# Patient Record
Sex: MALE | Race: WHITE | Employment: OTHER | URBAN - METROPOLITAN AREA
[De-identification: names, ages, dates, MRNs, and addresses within clinical notes are randomized per-mention and may not be internally consistent; named-entity substitution may affect disease eponyms.]

---

## 2017-01-06 ENCOUNTER — APPOINTMENT (OUTPATIENT)
Dept: CT IMAGING | Age: 72
DRG: 310 | End: 2017-01-06
Attending: EMERGENCY MEDICINE
Payer: MEDICARE

## 2017-01-06 ENCOUNTER — HOSPITAL ENCOUNTER (INPATIENT)
Age: 72
LOS: 2 days | Discharge: HOME | DRG: 310 | End: 2017-01-08
Attending: EMERGENCY MEDICINE | Admitting: FAMILY MEDICINE
Payer: MEDICARE

## 2017-01-06 ENCOUNTER — APPOINTMENT (OUTPATIENT)
Dept: GENERAL RADIOLOGY | Age: 72
DRG: 310 | End: 2017-01-06
Attending: EMERGENCY MEDICINE
Payer: MEDICARE

## 2017-01-06 DIAGNOSIS — R41.82 ALTERED MENTAL STATUS, UNSPECIFIED ALTERED MENTAL STATUS TYPE: ICD-10-CM

## 2017-01-06 DIAGNOSIS — R53.1 WEAKNESS: ICD-10-CM

## 2017-01-06 DIAGNOSIS — I48.0 PAROXYSMAL ATRIAL FIBRILLATION (HCC): Primary | ICD-10-CM

## 2017-01-06 PROBLEM — I48.91 ATRIAL FIBRILLATION WITH RVR (HCC): Status: ACTIVE | Noted: 2017-01-06

## 2017-01-06 PROBLEM — R77.8 TROPONIN LEVEL ELEVATED: Status: ACTIVE | Noted: 2017-01-06

## 2017-01-06 LAB
ALBUMIN SERPL BCP-MCNC: 3.6 G/DL (ref 3.5–5)
ALBUMIN/GLOB SERPL: 1.1 {RATIO} (ref 1.1–2.2)
ALP SERPL-CCNC: 125 U/L (ref 45–117)
ALT SERPL-CCNC: 62 U/L (ref 12–78)
ANION GAP BLD CALC-SCNC: 10 MMOL/L (ref 5–15)
APPEARANCE UR: CLEAR
APTT PPP: 25.3 SEC (ref 22.1–32.5)
AST SERPL W P-5'-P-CCNC: 53 U/L (ref 15–37)
BACTERIA URNS QL MICRO: NEGATIVE /HPF
BASOPHILS # BLD AUTO: 0 K/UL (ref 0–0.1)
BASOPHILS # BLD: 0 % (ref 0–1)
BILIRUB SERPL-MCNC: 0.2 MG/DL (ref 0.2–1)
BILIRUB UR QL: NEGATIVE
BUN SERPL-MCNC: 18 MG/DL (ref 6–20)
BUN/CREAT SERPL: 17 (ref 12–20)
CALCIUM SERPL-MCNC: 8.3 MG/DL (ref 8.5–10.1)
CHLORIDE SERPL-SCNC: 106 MMOL/L (ref 97–108)
CO2 SERPL-SCNC: 25 MMOL/L (ref 21–32)
COLOR UR: ABNORMAL
CREAT SERPL-MCNC: 1.04 MG/DL (ref 0.7–1.3)
DIFFERENTIAL METHOD BLD: ABNORMAL
EOSINOPHIL # BLD: 0.3 K/UL (ref 0–0.4)
EOSINOPHIL NFR BLD: 4 % (ref 0–7)
EPITH CASTS URNS QL MICRO: ABNORMAL /LPF
ERYTHROCYTE [DISTWIDTH] IN BLOOD BY AUTOMATED COUNT: 17.4 % (ref 11.5–14.5)
ETHANOL SERPL-MCNC: <10 MG/DL
GLOBULIN SER CALC-MCNC: 3.2 G/DL (ref 2–4)
GLUCOSE BLD STRIP.AUTO-MCNC: 100 MG/DL (ref 65–100)
GLUCOSE BLD STRIP.AUTO-MCNC: 142 MG/DL (ref 65–100)
GLUCOSE SERPL-MCNC: 161 MG/DL (ref 65–100)
GLUCOSE UR STRIP.AUTO-MCNC: NEGATIVE MG/DL
HCT VFR BLD AUTO: 40.8 % (ref 36.6–50.3)
HGB BLD-MCNC: 13.3 G/DL (ref 12.1–17)
HGB UR QL STRIP: ABNORMAL
INR PPP: 1 (ref 0.9–1.1)
KETONES UR QL STRIP.AUTO: NEGATIVE MG/DL
LACTATE SERPL-SCNC: 2.5 MMOL/L (ref 0.4–2)
LEUKOCYTE ESTERASE UR QL STRIP.AUTO: NEGATIVE
LYMPHOCYTES # BLD AUTO: 39 % (ref 12–49)
LYMPHOCYTES # BLD: 2.7 K/UL
MCH RBC QN AUTO: 23.6 PG (ref 26–34)
MCHC RBC AUTO-ENTMCNC: 32.6 G/DL (ref 30–36.5)
MCV RBC AUTO: 72.5 FL (ref 80–99)
MONOCYTES # BLD: 0.8 K/UL (ref 0–1)
MONOCYTES NFR BLD AUTO: 11 % (ref 5–13)
NEUTS SEG # BLD: 3.2 K/UL (ref 1.8–8)
NEUTS SEG NFR BLD AUTO: 46 % (ref 32–75)
NITRITE UR QL STRIP.AUTO: NEGATIVE
PH UR STRIP: 6.5 [PH] (ref 5–8)
PLATELET # BLD AUTO: 161 K/UL (ref 150–400)
POTASSIUM SERPL-SCNC: 3.9 MMOL/L (ref 3.5–5.1)
PROT SERPL-MCNC: 6.8 G/DL (ref 6.4–8.2)
PROT UR STRIP-MCNC: NEGATIVE MG/DL
PROTHROMBIN TIME: 10.1 SEC (ref 9–11.1)
RBC # BLD AUTO: 5.63 M/UL (ref 4.1–5.7)
RBC #/AREA URNS HPF: ABNORMAL /HPF (ref 0–5)
RBC MORPH BLD: ABNORMAL
SERVICE CMNT-IMP: ABNORMAL
SERVICE CMNT-IMP: NORMAL
SODIUM SERPL-SCNC: 141 MMOL/L (ref 136–145)
SP GR UR REFRACTOMETRY: 1.01 (ref 1–1.03)
THERAPEUTIC RANGE,PTTT: NORMAL SECS (ref 58–77)
TROPONIN I SERPL-MCNC: 0.07 NG/ML
UA: UC IF INDICATED,UAUC: ABNORMAL
UROBILINOGEN UR QL STRIP.AUTO: 0.2 EU/DL (ref 0.2–1)
WBC # BLD AUTO: 7 K/UL (ref 4.1–11.1)
WBC MORPH BLD: ABNORMAL
WBC URNS QL MICRO: ABNORMAL /HPF (ref 0–4)

## 2017-01-06 PROCEDURE — 74011250636 HC RX REV CODE- 250/636: Performed by: INTERNAL MEDICINE

## 2017-01-06 PROCEDURE — 99285 EMERGENCY DEPT VISIT HI MDM: CPT

## 2017-01-06 PROCEDURE — 74011000258 HC RX REV CODE- 258: Performed by: EMERGENCY MEDICINE

## 2017-01-06 PROCEDURE — 71010 XR CHEST PORT: CPT

## 2017-01-06 PROCEDURE — 74011000250 HC RX REV CODE- 250: Performed by: EMERGENCY MEDICINE

## 2017-01-06 PROCEDURE — 85025 COMPLETE CBC W/AUTO DIFF WBC: CPT | Performed by: EMERGENCY MEDICINE

## 2017-01-06 PROCEDURE — 82550 ASSAY OF CK (CPK): CPT | Performed by: INTERNAL MEDICINE

## 2017-01-06 PROCEDURE — 83605 ASSAY OF LACTIC ACID: CPT | Performed by: FAMILY MEDICINE

## 2017-01-06 PROCEDURE — 65660000000 HC RM CCU STEPDOWN

## 2017-01-06 PROCEDURE — 85730 THROMBOPLASTIN TIME PARTIAL: CPT | Performed by: EMERGENCY MEDICINE

## 2017-01-06 PROCEDURE — 36415 COLL VENOUS BLD VENIPUNCTURE: CPT | Performed by: INTERNAL MEDICINE

## 2017-01-06 PROCEDURE — 83605 ASSAY OF LACTIC ACID: CPT | Performed by: EMERGENCY MEDICINE

## 2017-01-06 PROCEDURE — 70450 CT HEAD/BRAIN W/O DYE: CPT

## 2017-01-06 PROCEDURE — 82962 GLUCOSE BLOOD TEST: CPT

## 2017-01-06 PROCEDURE — 74011250637 HC RX REV CODE- 250/637: Performed by: FAMILY MEDICINE

## 2017-01-06 PROCEDURE — 87040 BLOOD CULTURE FOR BACTERIA: CPT | Performed by: EMERGENCY MEDICINE

## 2017-01-06 PROCEDURE — 80053 COMPREHEN METABOLIC PANEL: CPT | Performed by: EMERGENCY MEDICINE

## 2017-01-06 PROCEDURE — 93005 ELECTROCARDIOGRAM TRACING: CPT

## 2017-01-06 PROCEDURE — 84484 ASSAY OF TROPONIN QUANT: CPT | Performed by: EMERGENCY MEDICINE

## 2017-01-06 PROCEDURE — 80307 DRUG TEST PRSMV CHEM ANLYZR: CPT | Performed by: EMERGENCY MEDICINE

## 2017-01-06 PROCEDURE — 85610 PROTHROMBIN TIME: CPT | Performed by: EMERGENCY MEDICINE

## 2017-01-06 PROCEDURE — 83036 HEMOGLOBIN GLYCOSYLATED A1C: CPT | Performed by: INTERNAL MEDICINE

## 2017-01-06 PROCEDURE — 81001 URINALYSIS AUTO W/SCOPE: CPT | Performed by: EMERGENCY MEDICINE

## 2017-01-06 RX ORDER — SODIUM CHLORIDE 0.9 % (FLUSH) 0.9 %
5-10 SYRINGE (ML) INJECTION AS NEEDED
Status: DISCONTINUED | OUTPATIENT
Start: 2017-01-06 | End: 2017-01-08 | Stop reason: HOSPADM

## 2017-01-06 RX ORDER — SODIUM CHLORIDE 9 MG/ML
75 INJECTION, SOLUTION INTRAVENOUS CONTINUOUS
Status: DISCONTINUED | OUTPATIENT
Start: 2017-01-06 | End: 2017-01-07

## 2017-01-06 RX ORDER — ROSUVASTATIN CALCIUM 10 MG/1
10 TABLET, COATED ORAL
Status: DISCONTINUED | OUTPATIENT
Start: 2017-01-06 | End: 2017-01-08 | Stop reason: HOSPADM

## 2017-01-06 RX ORDER — ASPIRIN AND DIPYRIDAMOLE 25; 200 MG/1; MG/1
1 CAPSULE, EXTENDED RELEASE ORAL 2 TIMES DAILY
Status: DISCONTINUED | OUTPATIENT
Start: 2017-01-06 | End: 2017-01-07

## 2017-01-06 RX ORDER — ZOLPIDEM TARTRATE 5 MG/1
5 TABLET ORAL
Status: DISCONTINUED | OUTPATIENT
Start: 2017-01-06 | End: 2017-01-06 | Stop reason: ALTCHOICE

## 2017-01-06 RX ORDER — HYDROMORPHONE HYDROCHLORIDE 1 MG/ML
0.5 INJECTION, SOLUTION INTRAMUSCULAR; INTRAVENOUS; SUBCUTANEOUS
Status: DISCONTINUED | OUTPATIENT
Start: 2017-01-06 | End: 2017-01-08 | Stop reason: HOSPADM

## 2017-01-06 RX ORDER — OMEPRAZOLE 20 MG/1
20 CAPSULE, DELAYED RELEASE ORAL DAILY
COMMUNITY
End: 2018-01-12 | Stop reason: SDUPTHER

## 2017-01-06 RX ORDER — ROSUVASTATIN CALCIUM 20 MG/1
10 TABLET, COATED ORAL
COMMUNITY
End: 2018-01-12 | Stop reason: SINTOL

## 2017-01-06 RX ORDER — OXYCODONE AND ACETAMINOPHEN 5; 325 MG/1; MG/1
1 TABLET ORAL
Status: DISCONTINUED | OUTPATIENT
Start: 2017-01-06 | End: 2017-01-08 | Stop reason: HOSPADM

## 2017-01-06 RX ORDER — TRIAMCINOLONE ACETONIDE 1 MG/G
OINTMENT TOPICAL 3 TIMES DAILY
COMMUNITY
End: 2018-01-12 | Stop reason: ALTCHOICE

## 2017-01-06 RX ORDER — ENOXAPARIN SODIUM 100 MG/ML
40 INJECTION SUBCUTANEOUS EVERY 24 HOURS
Status: DISCONTINUED | OUTPATIENT
Start: 2017-01-06 | End: 2017-01-08 | Stop reason: ALTCHOICE

## 2017-01-06 RX ORDER — MAGNESIUM SULFATE 100 %
4 CRYSTALS MISCELLANEOUS AS NEEDED
Status: DISCONTINUED | OUTPATIENT
Start: 2017-01-06 | End: 2017-01-08 | Stop reason: HOSPADM

## 2017-01-06 RX ORDER — INSULIN LISPRO 100 [IU]/ML
INJECTION, SOLUTION INTRAVENOUS; SUBCUTANEOUS
Status: DISCONTINUED | OUTPATIENT
Start: 2017-01-06 | End: 2017-01-08 | Stop reason: HOSPADM

## 2017-01-06 RX ORDER — OMEPRAZOLE 20 MG/1
20 CAPSULE, DELAYED RELEASE ORAL DAILY
Status: DISCONTINUED | OUTPATIENT
Start: 2017-01-07 | End: 2017-01-06 | Stop reason: CLARIF

## 2017-01-06 RX ORDER — PANTOPRAZOLE SODIUM 40 MG/1
40 TABLET, DELAYED RELEASE ORAL
Status: DISCONTINUED | OUTPATIENT
Start: 2017-01-07 | End: 2017-01-08 | Stop reason: HOSPADM

## 2017-01-06 RX ORDER — DILTIAZEM HYDROCHLORIDE 5 MG/ML
10 INJECTION INTRAVENOUS
Status: COMPLETED | OUTPATIENT
Start: 2017-01-06 | End: 2017-01-06

## 2017-01-06 RX ORDER — SODIUM CHLORIDE 0.9 % (FLUSH) 0.9 %
5-10 SYRINGE (ML) INJECTION EVERY 8 HOURS
Status: DISCONTINUED | OUTPATIENT
Start: 2017-01-06 | End: 2017-01-08 | Stop reason: HOSPADM

## 2017-01-06 RX ORDER — CLOBETASOL PROPIONATE 0.46 MG/ML
SOLUTION TOPICAL 2 TIMES DAILY
COMMUNITY
End: 2018-01-29 | Stop reason: SDUPTHER

## 2017-01-06 RX ORDER — DEXTROSE 50 % IN WATER (D50W) INTRAVENOUS SYRINGE
12.5-25 AS NEEDED
Status: DISCONTINUED | OUTPATIENT
Start: 2017-01-06 | End: 2017-01-08 | Stop reason: HOSPADM

## 2017-01-06 RX ORDER — LEVOTHYROXINE SODIUM 100 UG/1
100 TABLET ORAL
Status: DISCONTINUED | OUTPATIENT
Start: 2017-01-07 | End: 2017-01-08 | Stop reason: HOSPADM

## 2017-01-06 RX ORDER — ACETAMINOPHEN 325 MG/1
650 TABLET ORAL
Status: DISCONTINUED | OUTPATIENT
Start: 2017-01-06 | End: 2017-01-08 | Stop reason: HOSPADM

## 2017-01-06 RX ADMIN — Medication 10 ML: at 21:37

## 2017-01-06 RX ADMIN — DILTIAZEM HYDROCHLORIDE 10 MG/HR: 5 INJECTION, SOLUTION INTRAVENOUS at 18:20

## 2017-01-06 RX ADMIN — DILTIAZEM HYDROCHLORIDE 10 MG: 5 INJECTION INTRAVENOUS at 18:15

## 2017-01-06 RX ADMIN — ASPIRIN AND DIPYRIDAMOLE 1 CAPSULE: 25; 200 CAPSULE, EXTENDED RELEASE ORAL at 23:33

## 2017-01-06 RX ADMIN — ROSUVASTATIN CALCIUM 10 MG: 10 TABLET, FILM COATED ORAL at 23:34

## 2017-01-06 RX ADMIN — SODIUM CHLORIDE 75 ML/HR: 900 INJECTION, SOLUTION INTRAVENOUS at 21:34

## 2017-01-06 RX ADMIN — ENOXAPARIN SODIUM 40 MG: 40 INJECTION SUBCUTANEOUS at 21:36

## 2017-01-06 NOTE — ED NOTES
Pt become more \"alert\" over in CT. After CT finished pt started asking where he was and where his wife was. Pt talking more, alert and oriented x4.

## 2017-01-06 NOTE — ED NOTES
Pt having episodes of A-fib. Has had 2 episodes so far, lasting 30 seconds or less. Dr. Parris Noel aware.

## 2017-01-06 NOTE — ED PROVIDER NOTES
HPI Comments: The patient was brought in by his wife because of generalized weakness, confusion and the sensation that his heart was beating fast. On arrival here he collapsed while being taken out of the car by the nursing staff. He was wheeled back to room 13 and had to be lifted out of the wheelchair onto the stretcher. The patient is awake but confused, has slurred speech, and repeatedly says that his heart is \"beating out of my chest\". According to his wife he had complained of the past heart rate at around 3:30 this afternoon, and then he laid down for about an hour, and when he got up around 4:30 he felt worse, and felt like he did when he had a TIA/CVA 34 years ago. He denies chest pain, headache and there has been no vomiting or shortness of breath. Past Medical History:   Diagnosis Date    Acid reflux     Cardiac dysrhythmia, unspecified 12/21/2011    Diabetes ([address]     GERD (gastroesophageal reflux disease)     Headache(784.0) 12/21/2011    Hypercholesterolemia     Hypothyroid     Obesity     PUD (peptic ulcer disease)     TIA (transient ischemic attack)      2014       Past Surgical History:   Procedure Laterality Date    Hx gastrectomy      Hx tumor removal  2015     Left Eye    Hx mohs procedure Right      right shoulder    Hx heent       left retroorbital tumor resection         Family History:   Problem Relation Age of Onset    Diabetes Sister     Cancer Sister      lung    Diabetes Mother     Cancer Father      brain    Diabetes Maternal Grandmother     Hypertension Neg Hx        Social History     Social History    Marital status:      Spouse name: N/A    Number of children: N/A    Years of education: N/A     Occupational History    Not on file.      Social History Main Topics    Smoking status: Never Smoker    Smokeless tobacco: Never Used    Alcohol use No    Drug use: No    Sexual activity: No     Other Topics Concern    Not on file     Social History Narrative         ALLERGIES: Ambien [zolpidem]    Review of Systems   Constitutional: Negative for fever. Eyes: Negative for visual disturbance. Respiratory: Negative for cough, shortness of breath and wheezing. Cardiovascular: Positive for palpitations. Negative for chest pain and leg swelling. Gastrointestinal: Negative for abdominal pain, diarrhea, nausea and vomiting. Genitourinary: Negative for dysuria. Musculoskeletal: Negative. Negative for back pain and neck stiffness. Skin: Negative for rash. Neurological: Positive for weakness. Negative for syncope and headaches. Psychiatric/Behavioral: Positive for confusion. Vitals:    01/06/17 1718   BP: (!) 185/94   Pulse: 87   Resp: 12   Temp: 97.5 °F (36.4 °C)   SpO2: 96%   Weight: 88.5 kg (195 lb)            Physical Exam   Constitutional: He appears well-developed and well-nourished. No distress. HENT:   Head: Normocephalic. Eyes: EOM are normal. Pupils are equal, round, and reactive to light. Neck: Normal range of motion. Neck supple. Cardiovascular: Normal rate and regular rhythm. No murmur heard. Pulmonary/Chest: Effort normal and breath sounds normal. No respiratory distress. Abdominal: Soft. There is no tenderness. Musculoskeletal: Normal range of motion. He exhibits no edema. Neurological: He is alert. He has normal strength. No cranial nerve deficit. Confused. ox2. Slurred/stuttering speech. Generalized weakness . i cannot see focal weakness at this point. Skin: Skin is warm and dry. Nursing note and vitals reviewed. [facility]  ED Course       Procedures ED EKG interpretation:  Rhythm:nsr, rate 86 no acute changes. This EKG was interpreted by Goyo Snider MD,ED Provider. 540 pm- pt had an~ 30 sec. Run of a. Fib. That spont. Converted. Then another run at 543 that was captured on 12 lead. Pt does not see dr. Warden Alvarez anymore. He switched at first of year to dr. Vesta Bishop and saw him 2 days ago.      Spoke crissy dr. Ra Dasilva - he will adm.

## 2017-01-06 NOTE — ED TRIAGE NOTES
Assisted pt out of the car with wife. Pt slumped over in the chair, was unable to help us get him in the W/C. Pt lifted by staff and placed pt in the bed. Dr. Marshall Enciso at bedside. BS is 142 at bedside. Pt is talking and answering questions. Around 1630,Wife states that he told her his heart was beating fast and thought he might be having a heart attack or stroke.  HR initially 135, now is 86, NSR.

## 2017-01-06 NOTE — H&P
[facility]  [address], [place] [address]  [phone]    Admission History and Physical      NAME:  Albaro Mercado   :   1945   MRN:  [id number]     PCP:  Trav Rossi MD     Date/Time:  2017         Subjective:     CHIEF COMPLAINT: palpitations    HISTORY OF PRESENT ILLNESS:     Mr. Pooja Felder is a 66 y.o.  male who is admitted with afib with RVR. Mr. Pooja Felder presented to the [place] [address] Emergency Department this PM complaining of palpitations: today, acute onset, multiple episodes, on and off, felt like heart was \"racing,\" associated with weakness and dizziness, felt like he might pass out, wife reports he became confused at times. In the ED, he was observed to go into afib with RVR up to the 170s and was symptomatic at the time    History obtained from spouse, chart review and the patient.      Previous records reviewed    Past Medical History   Diagnosis Date    Acid reflux     Cardiac dysrhythmia, unspecified 2011    [address] [address]     GERD (gastroesophageal reflux disease)     Headache(784.0) 2011    Hypercholesterolemia     Hypothyroid     Obesity     PUD (peptic ulcer disease)     TIA (transient ischemic attack)              Past Surgical History   Procedure Laterality Date    Hx gastrectomy      Hx tumor removal  2015     Left Eye    Hx mohs procedure Right      right shoulder    Hx heent       left retroorbital tumor resection       Social History   Substance Use Topics    Smoking status: Never Smoker    Smokeless tobacco: Never Used    Alcohol use No        Family History   Problem Relation Age of Onset    Diabetes Sister     Cancer Sister      lung    Diabetes Mother     Cancer Father      brain    Diabetes Maternal Grandmother     Hypertension Neg Hx         Allergies   Allergen Reactions    Ambien [Zolpidem] Other (comments)     Sleep walking  Doing things like cooking but he was not awake        Prior to Admission medications    Medication Sig Start Date End Date Taking? Authorizing Provider   HYDROcodone-acetaminophen (NORCO)  mg tablet Take 1 Tab by mouth two (2) times a day. Max Daily Amount: 2 Tabs. 12/15/16   John Chairez NP   levothyroxine (SYNTHROID) 100 mcg tablet TAKE 1 TABLET BY MOUTH EVERY DAY BEFORE BREAKFAST 84/5/63   Maribeth Escobar MD   clobetasol (TEMOVATE) 0.05 % external solution USE 2 TIMES DAILY IN SCALP 75/9/51   Maribeth Escobar MD   mupirocin [facility]) 2 % ointment Apply  to affected area two (2) times a day. 10/21/16   John Chairez NP   clotrimazole-betamethasone (LOTRISONE) topical cream APPLY TO AFFECTED AREA FOUR TIMES A DAY 92/5/28   Maribeth Escobar MD   omeprazole (PRILOSEC) 20 mg capsule Take 20 mg by mouth once over twenty-four (24) hours. 9/3/16   Historical Provider   naproxen (NAPROSYN) 500 mg tablet Take 1 Tab by mouth two (2) times daily (with meals). Indications: PAIN 9/38/08   Maribeth Escobar MD   mupirocin [facility]) 2 % ointment Apply  to affected area daily. 8/54/12   Maribeth Escobar MD   gentamicin (GARAMYCIN) 0.3 % ophthalmic solution Administer 2 Drops to both eyes every four (4) hours. 6/85/56   Maribeth Escobar MD   rosuvastatin (CRESTOR) 20 mg tablet Take 1 Tab by mouth nightly. 4/2/12   Maribeth Escobar MD   FREESTYLE LITE STRIPS strip 100 Each by Does Not Apply route once over twenty-four (24) hours. Test blood sugar up to 3 times daily   Dx R46.2 3/59/26   Maribeth Escobar MD   triamcinolone acetonide (KENALOG) 0.1 % topical cream Apply 1 Drop to affected area two (2) times daily as needed. 5/15/16   Historical Provider   metFORMIN (GLUCOPHAGE) 1,000 mg tablet Take 1,000 mg by mouth two (2) times daily (with meals). Historical Provider   melatonin (MELATONIN) 5 mg cap capsule Take 10 mg by mouth nightly.     Historical Provider   fluocinoNIDE (LIDEX) 0.05 % ointment Apply 1 Drop to affected area two (2) times daily as needed. 2/19/16   Historical Provider   PROCTOSOL HC 2.5 % rectal cream USE ON RECTUM TWICE A DAY 0/55/58   Win Sorto MD   FREESTYLE LANCETS 28 gauge misc 1 Package by IntraDERMal route once over twenty-four (24) hours. 3/10/16   Historical Provider   multivitamin (ONE A DAY) tablet Take 1 Tab by mouth daily. Historical Provider   Cholecalciferol, Vitamin D3, 1,000 unit cap Take 1,000 Units by mouth daily. Historical Provider   dipyridamole-aspirin (AGGRENOX) 200-25 mg per SR capsule Take 1 Cap by mouth two (2) times a day.  11/30/12   Jamir Nash MD         Review of Systems:  (bold if positive, if negative)    Gen:  fatigueEyes:  ENT:  CVS:  PalpitationsdizzinessPulm:  GI:    :    MS:  Skin:  Psych:  Endo:    Hem:  Renal:    Neuro:  weakness          Objective:      VITALS:    Vital signs reviewed; most recent are:    Visit Vitals    /82 (BP 1 Location: Left arm, BP Patient Position: At rest)    Pulse 84    Temp 97.5 °F (36.4 °C)    Resp 10    Wt 88.5 kg (195 lb)    SpO2 95%    BMI 30.54 kg/m2     SpO2 Readings from Last 6 Encounters:   01/06/17 95%   12/15/16 99%   11/15/16 90%   10/14/16 95%   09/16/16 96%   08/10/16 97%        No intake or output data in the 24 hours ending 01/06/17 1758         Exam:     Physical Exam:    Gen: Well-developed, obese, in no acute distress  HEENT:  Pink conjunctivae, PERRL, hearing intact to voice, moist mucous membranes  Neck: Supple, without masses, thyroid non-tender  Resp: No accessory muscle use, clear breath sounds without wheezes rales or rhonchi  Card: No murmurs, irreg, irreg S1, S2 without thrills, bruits or peripheral edema  Abd:  Soft, non-tender, non-distended, normoactive bowel sounds are present, no palpable organomegaly and no detectable hernias  Lymph:  No cervical or inguinal adenopathy  Musc: No cyanosis or clubbing  Skin: No rashes or ulcers, skin turgor is good  Neuro:  Cranial nerves are grossly intact, no focal motor weakness, follows commands appropriately  Psych:  Good insight, oriented to person, place and time, alert             Labs:    No results for input(s): WBC, HGB, HCT, PLT, HGBEXT, HCTEXT, PLTEXT, HGBEXT, HCTEXT, PLTEXT in the last 72 hours. No results for input(s): NA, K, CL, CO2, GLU, BUN, CREA, CA, MG, PHOS, ALB, TBIL, TBILI, SGOT, ALT in the last 72 hours. Lab Results   Component Value Date/Time    Glucose (POC) 142 01/06/2017 05:12 PM    Glucose (POC) 99 11/13/2012 04:34 PM    Glucose (POC) 99 04/04/2012 06:03 PM    Glucose (POC) 93 12/21/2011 05:24 PM     No results for input(s): PH, PCO2, PO2, HCO3, FIO2 in the last 72 hours. No results for input(s): INR in the last 72 hours. No lab exists for component: INREXT, INREXT    Additional testing:  Chest X-ray: no acute process.   Results not reviewed with Radiologist.    Head CT - No acute process       Assessment/Plan:       Principal Problem:    Atrial fibrillation with RVR ([facility] [address] (1/6/2017)   - started on diltiazem at [facility] [place], will continue   - Cardiology to see   - check echo and TSH    Active Problems:    Mixed hyperlipidemia (10/2/2010)   - continue statin      Obese (12/21/2011)   - counseled on weight loss      GERD (gastroesophageal reflux disease) (1/14/2016)   - continue PPI      Diabetes mellitus type 2, controlled ([facility] [address] (1/14/2016)   - follow BS on home meds with SSI   - check A1c      Elevated troponin   - minimally elevated, likely due to rate-related strain   - trend enzymes, echo as above       Code status:   - patient is FULL CODE      Total time spent with patient: [address] discussed with: Patient, Family, Nursing Staff and     Discussed:  Code Status, Care Plan and D/C Planning    Prophylaxis:  Lovenox and SCD's    Probable Disposition:  Home w/Family           ___________________________________________________    Attending Physician: Manuel Servin MD

## 2017-01-06 NOTE — IP AVS SNAPSHOT
Current Discharge Medication List  
  
Take these medications at their scheduled times Dose & Instructions Dispensing Information Comments Morning Noon Evening Bedtime  
 apixaban 5 mg tablet Commonly known as:  Pham Net Your next dose is: Today, Tomorrow Other:  ____________ Dose:  5 mg Take 1 Tab by mouth two (2) times a day. Quantity:  60 Tab Refills:  0  
     
   
   
   
  
 aspirin 81 mg chewable tablet Your next dose is: Today, Tomorrow Other:  ____________ Dose:  81 mg Take 1 Tab by mouth daily. Quantity:  30 Tab Refills:  0  
     
   
   
   
  
 clobetasol 0.05 % external solution Commonly known as:  Caryle Killer Your next dose is: Today, Tomorrow Other:  ____________ Apply  to affected area two (2) times a day. Indications: DERMATOSIS OF THE SCALP Refills:  0  
     
   
   
   
  
 dilTIAZem 30 mg tablet Commonly known as:  CARDIZEM Your next dose is: Today, Tomorrow Other:  ____________ Dose:  30 mg Take 1 Tab by mouth two (2) times a day. Quantity:  60 Tab Refills:  0  
     
   
   
   
  
 metFORMIN 1,000 mg tablet Commonly known as:  GLUCOPHAGE Your next dose is: Today, Tomorrow Other:  ____________ Dose:  1000 mg Take 1,000 mg by mouth two (2) times daily (with meals). Refills:  0  
     
   
   
   
  
 omeprazole 20 mg capsule Commonly known as:  PRILOSEC Your next dose is: Today, Tomorrow Other:  ____________ Dose:  20 mg Take 20 mg by mouth daily. Refills:  0  
     
   
   
   
  
 rosuvastatin 20 mg tablet Commonly known as:  CRESTOR Your next dose is: Today, Tomorrow Other:  ____________ Dose:  10 mg Take 10 mg by mouth nightly. Refills:  0  
     
   
   
   
  
 triamcinolone acetonide 0.1 % ointment Commonly known as:  KENALOG Your next dose is: Today, Tomorrow Other:  ____________ Apply  to affected area three (3) times daily. use thin layer to affected area Refills:  0 Take these medications as directed Dose & Instructions Dispensing Information Comments Morning Noon Evening Bedtime FREESTYLE LANCETS 28 gauge Misc Generic drug:  lancets Your next dose is: Today, Tomorrow Other:  ____________ Dose:  1 Package 1 Package by IntraDERMal route once over twenty-four (24) hours. Refills:  4 FREESTYLE LITE STRIPS strip Generic drug:  glucose blood VI test strips Your next dose is: Today, Tomorrow Other:  ____________ Dose:  100 Each  
100 Each by Does Not Apply route once over twenty-four (24) hours. Test blood sugar up to 3 times daily   Dx E11.9 Quantity:  100 Strip Refills:  5  
     
   
   
   
  
 levothyroxine 100 mcg tablet Commonly known as:  SYNTHROID Your next dose is: Today, Tomorrow Other:  ____________ TAKE 1 TABLET BY MOUTH EVERY DAY BEFORE BREAKFAST Quantity:  90 Tab Refills:  1 Where to Get Your Medications Information about where to get these medications is not yet available ! Ask your nurse or doctor about these medications  
  apixaban 5 mg tablet  
 aspirin 81 mg chewable tablet  
 dilTIAZem 30 mg tablet

## 2017-01-06 NOTE — ROUTINE PROCESS
TRANSFER - OUT REPORT:    Verbal report given to Bernabe Sewell RN on [address]  being transferred to [place] [address] for higher level of care/admission       Report consisted of patients Situation, Background, Assessment and   Recommendations(SBAR). Information from the following report(s) SBAR was reviewed with the receiving nurse. Lines:   Peripheral IV 01/06/17 Right Hand (Active)   Site Assessment Clean, dry, & intact 1/6/2017  5:24 PM   Phlebitis Assessment 0 1/6/2017  5:24 PM   Infiltration Assessment 0 1/6/2017  5:24 PM   Dressing Status Clean, dry, & intact 1/6/2017  5:24 PM   Dressing Type Transparent 1/6/2017  5:24 PM   Hub Color/Line Status Pink 1/6/2017  5:24 PM   Action Taken Blood drawn 1/6/2017  5:24 PM   Alcohol Cap Used Yes 1/6/2017  5:24 PM       Peripheral IV 01/06/17 Right Antecubital (Active)   Site Assessment Clean, dry, & intact 1/6/2017  5:24 PM   Phlebitis Assessment 0 1/6/2017  5:24 PM   Infiltration Assessment 0 1/6/2017  5:24 PM   Dressing Status Clean, dry, & intact 1/6/2017  5:24 PM   Dressing Type Transparent 1/6/2017  5:24 PM   Hub Color/Line Status Pink 1/6/2017  5:24 PM   Action Taken Blood drawn 1/6/2017  5:24 PM   Alcohol Cap Used Yes 1/6/2017  5:24 PM        Opportunity for questions and clarification was provided.       Patient transported with:   Tyrone Treviño

## 2017-01-06 NOTE — IP AVS SNAPSHOT
[address] [address] 
[phone] Patient: Lara Carmona MRN: [id number] :1945 You are allergic to the following Allergen Reactions Ambien (Zolpidem) Other (comments) Sleep walking Doing things like cooking but he was not awake Recent Documentation Weight BMI Smoking Status 101.6 kg 35.08 kg/m2 Never Smoker Unresulted Labs Order Current Status CULTURE, BLOOD, PAIRED Preliminary result Emergency Contacts Name Discharge Info Relation Home Work Mobile Suyapa Sorenson DISCHARGE CAREGIVER [3] Spouse [3]   [phone] About your hospitalization You were admitted on:  2017 You last received care in the:  [facility] You were discharged on:  2017 Unit phone number:  [phone] Why you were hospitalized Your primary diagnosis was:  Atrial Fibrillation With Rvr (Hcc) Your diagnoses also included:  Diabetes Mellitus Type 2, Controlled (Hcc), Gerd (Gastroesophageal Reflux Disease), Mixed Hyperlipidemia, Obese, Troponin Level Elevated Providers Seen During Your Hospitalizations Provider Role Specialty Primary office phone Jesús Cheek MD Attending Provider Emergency Medicine [phone] Dean Tanner MD Attending Provider [facility] [phone] Your Primary Care Physician (PCP) Primary Care Physician Office Phone Office Fax Minerva Silvano [phone] Follow-up Information Follow up With Details Comments Contact Info Kirstin Sawant MD   [address] [place] [address] 
[phone] Current Discharge Medication List  
  
START taking these medications Dose & Instructions Dispensing Information Comments Morning Noon Evening Bedtime  
 apixaban 5 mg tablet Commonly known as:  Maria Teresa Dan Your next dose is: Today, Tomorrow Other:  _________ Dose:  5 mg Take 1 Tab by mouth two (2) times a day. Quantity:  60 Tab Refills:  0  
     
   
   
   
  
 aspirin 81 mg chewable tablet Your next dose is: Today, Tomorrow Other:  _________ Dose:  81 mg Take 1 Tab by mouth daily. Quantity:  30 Tab Refills:  0  
     
   
   
   
  
 dilTIAZem 30 mg tablet Commonly known as:  CARDIZEM Your next dose is: Today, Tomorrow Other:  _________ Dose:  30 mg Take 1 Tab by mouth two (2) times a day. Quantity:  60 Tab Refills:  0 CONTINUE these medications which have NOT CHANGED Dose & Instructions Dispensing Information Comments Morning Noon Evening Bedtime  
 clobetasol 0.05 % external solution Commonly known as:  Charmaine Davilaer Your next dose is: Today, Tomorrow Other:  _________ Apply  to affected area two (2) times a day. Indications: DERMATOSIS OF THE SCALP Refills:  0 FREESTYLE LANCETS 28 gauge Misc Generic drug:  lancets Your next dose is: Today, Tomorrow Other:  _________ Dose:  1 Package 1 Package by IntraDERMal route once over twenty-four (24) hours. Refills:  4 FREESTYLE LITE STRIPS strip Generic drug:  glucose blood VI test strips Your next dose is: Today, Tomorrow Other:  _________ Dose:  100 Each  
100 Each by Does Not Apply route once over twenty-four (24) hours. Test blood sugar up to 3 times daily   Dx E11.9 Quantity:  100 Strip Refills:  5  
     
   
   
   
  
 levothyroxine 100 mcg tablet Commonly known as:  SYNTHROID Your next dose is: Today, Tomorrow Other:  _________ TAKE 1 TABLET BY MOUTH EVERY DAY BEFORE BREAKFAST Quantity:  90 Tab Refills:  1  
     
   
   
   
  
 metFORMIN 1,000 mg tablet Commonly known as:  GLUCOPHAGE Your next dose is: Today, Tomorrow Other:  _________ Dose:  1000 mg Take 1,000 mg by mouth two (2) times daily (with meals). Refills:  0  
     
   
   
   
  
 omeprazole 20 mg capsule Commonly known as:  PRILOSEC Your next dose is: Today, Tomorrow Other:  _________ Dose:  20 mg Take 20 mg by mouth daily. Refills:  0  
     
   
   
   
  
 rosuvastatin 20 mg tablet Commonly known as:  CRESTOR Your next dose is: Today, Tomorrow Other:  _________ Dose:  10 mg Take 10 mg by mouth nightly. Refills:  0  
     
   
   
   
  
 triamcinolone acetonide 0.1 % ointment Commonly known as:  KENALOG Your next dose is: Today, Tomorrow Other:  _________ Apply  to affected area three (3) times daily. use thin layer to affected area Refills:  0 STOP taking these medications   
 aspirin-dipyridamole  mg per SR capsule Commonly known as:  AGGRENOX Where to Get Your Medications Information on where to get these meds will be given to you by the nurse or doctor. ! Ask your nurse or doctor about these medications  
  apixaban 5 mg tablet  
 aspirin 81 mg chewable tablet  
 dilTIAZem 30 mg tablet Discharge Instructions Patient Discharge Instructions Baltazar Spatz / [id number] : 1945 Admitted 2017 Discharged: 2017 9:12 AM  
 
ACUTE DIAGNOSES: 
Atrial fibrillation with RVR ([facility] [address] CHRONIC MEDICAL DIAGNOSES: 
Problem List as of 2017  Date Reviewed: 2017 Codes Class Noted - Resolved * (Principal)Atrial fibrillation with RVR (HCC) ICD-10-CM: I48.91 
ICD-9-CM: 427.31  2017 - Present Troponin level elevated ICD-10-CM: R79.89 ICD-9-CM: 790.6  2017 - Present Atopic dermatitis (Chronic) ICD-10-CM: L20.9 ICD-9-CM: 691.8  12/15/2016 - Present Actinic keratosis (Chronic) ICD-10-CM: L57.0 ICD-9-CM: 702.0  11/15/2016 - Present Skin cancer of nose (Chronic) ICD-10-CM: C44.301 ICD-9-CM: 173.30  4/18/2016 - Present Spondylosis of lumbar joint (Chronic) ICD-10-CM: M47.816 ICD-9-CM: 721.3  3/14/2016 - Present Polyposis coli (Chronic) ICD-10-CM: D12.6 ICD-9-CM: 211.3  2/19/2016 - Present Overview Addendum 7/68/1588  2:15 PM by MD Dr. Radha Javier 2016 Advanced care planning/counseling discussion (Chronic) ICD-10-CM: Z71.89 ICD-9-CM: V65.49  2/19/2016 - Present Overview Signed 2/19/2016  2:36 PM by Ronda Restrepo RN Patient states that a completed AMD is at home. NN encouraged patient to bring a copy to the office for scanning into the medical record. Patient verbalized understanding. Chronic back pain (Chronic) ICD-10-CM: M54.9, G89.29 ICD-9-CM: 724.5, 338.29  1/14/2016 - Present Acquired hypothyroidism (Chronic) ICD-10-CM: E03.9 ICD-9-CM: 244.9  1/14/2016 - Present GERD (gastroesophageal reflux disease) (Chronic) ICD-10-CM: K21.9 ICD-9-CM: 530.81  1/14/2016 - Present Diabetes mellitus type 2, controlled ([facility] [address] (Chronic) ICD-10-CM: E11.9 ICD-9-CM: 250.00  1/14/2016 - Present Obese (Chronic) ICD-10-CM: T95.6 ICD-9-CM: 278.00  12/21/2011 - Present Mixed hyperlipidemia (Chronic) ICD-10-CM: O95.4 ICD-9-CM: 272.2  10/2/2010 - Present Other abnormal glucose (Chronic) ICD-10-CM: R73.09 
ICD-9-CM: 790.29  6/17/2010 - Present RESOLVED: Laceration ICD-10-CM: T14.8 ICD-9-CM: 879.8  10/21/2016 - 1/6/2017 RESOLVED: Chest pain ICD-10-CM: R07.9 ICD-9-CM: 786.50  4/29/2016 - 1/6/2017 RESOLVED: Cardiac dysrhythmia ICD-10-CM: I49.9 ICD-9-CM: 427.9  12/21/2011 - 1/6/2017 RESOLVED: Headache(784.0) ICD-9-CM: 784.0  12/21/2011 - 1/6/2017 RESOLVED: TIA (transient ischemic attack) ICD-10-CM: G45.9 ICD-9-CM: 435.9  12/21/2011 - 1/6/2017 RESOLVED: Diarrhea ICD-10-CM: R19.7 ICD-9-CM: 787.91 Chronic 12/21/2011 - 1/6/2017 RESOLVED: Insomnia, unspecified ICD-10-CM: G47.00 ICD-9-CM: 780.52  6/17/2010 - 1/6/2017 DISCHARGE MEDICATIONS:  
 
 
 
· It is important that you take the medication exactly as they are prescribed. · Keep your medication in the bottles provided by the pharmacist and keep a list of the medication names, dosages, and times to be taken in your wallet. · Do not take other medications without consulting your doctor. DIET:  Cardiac Diet ACTIVITY: Activity as tolerated ADDITIONAL INFORMATION: If you experience any of the following symptoms then please call your primary care physician or return to the emergency room if you cannot get hold of your doctor: Fever, chills, nausea, vomiting, diarrhea, change in mentation, falling, bleeding, shortness of breath. FOLLOW UP CARE: 
Dr. Allison Li MD  you are to call and set up an appointment to see them with in 1 week. Follow-up with specialists at directed by them Information obtained by : 
I understand that if any problems occur once I am at home I am to contact my physician. I understand and acknowledge receipt of the instructions indicated above. Physician's or R.N.'s Signature                                                                  Date/Time Patient or Representative Signature                                                          Date/Time Discharge Orders None [employer] Announcement  We are excited to announce that we are making your provider's discharge notes available to you in Textingly. You will see these notes when they are completed and signed by the physician that discharged you from your recent hospital stay. If you have any questions or concerns about any information you see in Textingly, please call the Health Information Department where you were seen or reach out to your Primary Care Provider for more information about your plan of care. Introducing [facility]! Dear Blanche Mari: Thank you for requesting a Textingly account. Our records indicate that you already have an active Textingly account. You can access your account anytime at [email or web address]. [email or web address] Did you know that you can access your hospital and ER discharge instructions at any time in Textingly? You can also review all of your test results from your hospital stay or ER visit. Additional Information If you have questions, please visit the Frequently Asked Questions section of the Textingly website at [email or web address] Remember, Textingly is NOT to be used for urgent needs. For medical emergencies, dial 911. Now available from your iPhone and Android! General Information Please provide this summary of care documentation to your next provider. Patient Signature:  ____________________________________________________________ Date:  ____________________________________________________________  
  
Melba Hawkins Provider Signature:  ____________________________________________________________ Date:  ____________________________________________________________

## 2017-01-07 LAB
ANION GAP BLD CALC-SCNC: 9 MMOL/L (ref 5–15)
BUN SERPL-MCNC: 12 MG/DL (ref 6–20)
BUN/CREAT SERPL: 12 (ref 12–20)
CALCIUM SERPL-MCNC: 8 MG/DL (ref 8.5–10.1)
CHLORIDE SERPL-SCNC: 108 MMOL/L (ref 97–108)
CK SERPL-CCNC: 254 U/L (ref 39–308)
CK SERPL-CCNC: 297 U/L (ref 39–308)
CO2 SERPL-SCNC: 25 MMOL/L (ref 21–32)
CREAT SERPL-MCNC: 0.97 MG/DL (ref 0.7–1.3)
ERYTHROCYTE [DISTWIDTH] IN BLOOD BY AUTOMATED COUNT: 16.4 % (ref 11.5–14.5)
EST. AVERAGE GLUCOSE BLD GHB EST-MCNC: 154 MG/DL
GLUCOSE BLD STRIP.AUTO-MCNC: 116 MG/DL (ref 65–100)
GLUCOSE BLD STRIP.AUTO-MCNC: 116 MG/DL (ref 65–100)
GLUCOSE BLD STRIP.AUTO-MCNC: 119 MG/DL (ref 65–100)
GLUCOSE BLD STRIP.AUTO-MCNC: 97 MG/DL (ref 65–100)
GLUCOSE SERPL-MCNC: 117 MG/DL (ref 65–100)
HBA1C MFR BLD: 7 % (ref 4.2–6.3)
HCT VFR BLD AUTO: 38.5 % (ref 36.6–50.3)
HGB BLD-MCNC: 12.6 G/DL (ref 12.1–17)
LACTATE SERPL-SCNC: 1.8 MMOL/L (ref 0.4–2)
LACTATE SERPL-SCNC: 2.1 MMOL/L (ref 0.4–2)
MAGNESIUM SERPL-MCNC: 1.9 MG/DL (ref 1.6–2.4)
MCH RBC QN AUTO: 23.6 PG (ref 26–34)
MCHC RBC AUTO-ENTMCNC: 32.7 G/DL (ref 30–36.5)
MCV RBC AUTO: 72 FL (ref 80–99)
PHOSPHATE SERPL-MCNC: 3.6 MG/DL (ref 2.6–4.7)
PLATELET # BLD AUTO: 142 K/UL (ref 150–400)
POTASSIUM SERPL-SCNC: 3.9 MMOL/L (ref 3.5–5.1)
RBC # BLD AUTO: 5.35 M/UL (ref 4.1–5.7)
SERVICE CMNT-IMP: ABNORMAL
SERVICE CMNT-IMP: NORMAL
SODIUM SERPL-SCNC: 142 MMOL/L (ref 136–145)
TROPONIN I SERPL-MCNC: <0.04 NG/ML
TROPONIN I SERPL-MCNC: <0.04 NG/ML
TSH SERPL DL<=0.05 MIU/L-ACNC: 2.13 UIU/ML (ref 0.36–3.74)
WBC # BLD AUTO: 6.9 K/UL (ref 4.1–11.1)

## 2017-01-07 PROCEDURE — 83605 ASSAY OF LACTIC ACID: CPT | Performed by: FAMILY MEDICINE

## 2017-01-07 PROCEDURE — 82550 ASSAY OF CK (CPK): CPT | Performed by: INTERNAL MEDICINE

## 2017-01-07 PROCEDURE — 36415 COLL VENOUS BLD VENIPUNCTURE: CPT | Performed by: INTERNAL MEDICINE

## 2017-01-07 PROCEDURE — 74011250637 HC RX REV CODE- 250/637: Performed by: FAMILY MEDICINE

## 2017-01-07 PROCEDURE — 84484 ASSAY OF TROPONIN QUANT: CPT | Performed by: INTERNAL MEDICINE

## 2017-01-07 PROCEDURE — 74011250636 HC RX REV CODE- 250/636: Performed by: INTERNAL MEDICINE

## 2017-01-07 PROCEDURE — 83735 ASSAY OF MAGNESIUM: CPT | Performed by: INTERNAL MEDICINE

## 2017-01-07 PROCEDURE — 74011000258 HC RX REV CODE- 258: Performed by: EMERGENCY MEDICINE

## 2017-01-07 PROCEDURE — 85027 COMPLETE CBC AUTOMATED: CPT | Performed by: INTERNAL MEDICINE

## 2017-01-07 PROCEDURE — 82962 GLUCOSE BLOOD TEST: CPT

## 2017-01-07 PROCEDURE — 80048 BASIC METABOLIC PNL TOTAL CA: CPT | Performed by: INTERNAL MEDICINE

## 2017-01-07 PROCEDURE — 74011000250 HC RX REV CODE- 250: Performed by: EMERGENCY MEDICINE

## 2017-01-07 PROCEDURE — 74011250637 HC RX REV CODE- 250/637: Performed by: INTERNAL MEDICINE

## 2017-01-07 PROCEDURE — 93306 TTE W/DOPPLER COMPLETE: CPT

## 2017-01-07 PROCEDURE — 65660000000 HC RM CCU STEPDOWN

## 2017-01-07 PROCEDURE — 84100 ASSAY OF PHOSPHORUS: CPT | Performed by: INTERNAL MEDICINE

## 2017-01-07 PROCEDURE — 84443 ASSAY THYROID STIM HORMONE: CPT | Performed by: INTERNAL MEDICINE

## 2017-01-07 PROCEDURE — 77030032490 HC SLV COMPR SCD KNE COVD -B

## 2017-01-07 RX ORDER — GUAIFENESIN 100 MG/5ML
81 LIQUID (ML) ORAL DAILY
Status: DISCONTINUED | OUTPATIENT
Start: 2017-01-07 | End: 2017-01-08 | Stop reason: HOSPADM

## 2017-01-07 RX ORDER — DILTIAZEM HYDROCHLORIDE 30 MG/1
30 TABLET, FILM COATED ORAL 3 TIMES DAILY
Status: DISCONTINUED | OUTPATIENT
Start: 2017-01-07 | End: 2017-01-08

## 2017-01-07 RX ADMIN — Medication 10 ML: at 22:05

## 2017-01-07 RX ADMIN — Medication 10 ML: at 05:46

## 2017-01-07 RX ADMIN — Medication 10 ML: at 13:50

## 2017-01-07 RX ADMIN — ACETAMINOPHEN 650 MG: 325 TABLET ORAL at 05:45

## 2017-01-07 RX ADMIN — ASPIRIN 81 MG CHEWABLE TABLET 81 MG: 81 TABLET CHEWABLE at 11:39

## 2017-01-07 RX ADMIN — ENOXAPARIN SODIUM 40 MG: 40 INJECTION SUBCUTANEOUS at 22:04

## 2017-01-07 RX ADMIN — DILTIAZEM HYDROCHLORIDE 30 MG: 30 TABLET, FILM COATED ORAL at 09:12

## 2017-01-07 RX ADMIN — ROSUVASTATIN CALCIUM 10 MG: 10 TABLET, FILM COATED ORAL at 23:39

## 2017-01-07 RX ADMIN — DILTIAZEM HYDROCHLORIDE 30 MG: 30 TABLET, FILM COATED ORAL at 15:41

## 2017-01-07 RX ADMIN — ASPIRIN AND DIPYRIDAMOLE 1 CAPSULE: 25; 200 CAPSULE, EXTENDED RELEASE ORAL at 09:12

## 2017-01-07 RX ADMIN — APIXABAN 5 MG: 5 TABLET, FILM COATED ORAL at 17:53

## 2017-01-07 RX ADMIN — PANTOPRAZOLE SODIUM 40 MG: 40 TABLET, DELAYED RELEASE ORAL at 08:23

## 2017-01-07 RX ADMIN — DILTIAZEM HYDROCHLORIDE 10 MG/HR: 5 INJECTION, SOLUTION INTRAVENOUS at 05:48

## 2017-01-07 RX ADMIN — LEVOTHYROXINE SODIUM 100 MCG: 100 TABLET ORAL at 08:23

## 2017-01-07 RX ADMIN — APIXABAN 5 MG: 5 TABLET, FILM COATED ORAL at 11:39

## 2017-01-07 NOTE — ED NOTES
Based on TRST score of 1, functional assessment discussed in collaboration with Provider , Family member/representative , and Patient. No referrals needed at this time.

## 2017-01-07 NOTE — PROGRESS NOTES
0715 - Received pt laying in bed a/ox 4 with pleasant and appropriate conversation. No c/o pain or respiratory distress at this time. Sinus arrhythmia noted on the monitor, on Cardizem drip running at 10 and NSS at 75.    07 - 930 Pt's heart rate in 60's on a Cardizem drip at 10. Decreased cardizem to 5, called cardiology consult into Dr. Audra Maldonado and received new orders for Cardizem 30mg PO TID and administered first dose at 912. Kept cardizem drip running per Dr. Zhu . 1100 - Stopped cardizem drip per Dr. Kip Zuniga in 70's. Sinus Arrhythmia. 200 - Pt cleared for discharge however his wife is unable to get to the hospital today due to weather. He will be discharged tomorrow.  - Reassessment performed, no changes noted to pt's condition.  - Reassessment performed, no changes noted to pt's condition.  - Bedside shift change report given to Tiffani Wood RN (oncoming nurse) by Naomi Gurrola RN (offgoing nurse). Report included the following information SBAR, Kardex, ED Summary, Intake/Output, MAR, Accordion, Recent Results, Med Rec Status and Cardiac Rhythm Sinus Arrhythmia .

## 2017-01-07 NOTE — PROGRESS NOTES
Daily Progress Note: 1/7/2017  [facility] [place]    Assessment/Plan:   Atrial fibrillation with RVR ([facility] [address] (1/6/2017)  - started on diltiazem at [facility] [place], will continue  - Cardiology has seen- will continue CArdizem 30mg TID and start Eliquis 5mg BID  - check TSH which is normal     Mixed hyperlipidemia (10/2/2010)  - continue statin     Obese (12/21/2011)  - counseled on weight loss     GERD (gastroesophageal reflux disease) (1/14/2016)  - continue PPI     Diabetes mellitus type 2, controlled ([facility] [address] (1/14/2016)  - follow BS on home meds with SSI  - check A1c- pending     Elevated troponin  - minimally elevated, likely due to rate-related strain  - trend enzymes, echo as above    Hx TIA  - ASA 81mg daily to start    Code status:  - patient is FULL CODE       Problem List:  Problem List as of 1/7/2017  Date Reviewed: 1/6/2017          Codes Class Noted - Resolved    * (Principal)Atrial fibrillation with RVR ([facility] [address] ICD-10-CM: I48.91  ICD-9-CM: 427.31  1/6/2017 - Present        Troponin level elevated ICD-10-CM: R79.89  ICD-9-CM: 790.6  1/6/2017 - Present        Atopic dermatitis (Chronic) ICD-10-CM: L20.9  ICD-9-CM: 691.8  12/15/2016 - Present        Actinic keratosis (Chronic) ICD-10-CM: L57.0  ICD-9-CM: 702.0  11/15/2016 - Present        Skin cancer of nose (Chronic) ICD-10-CM: C44.301  ICD-9-CM: 173.30  4/18/2016 - Present        Spondylosis of lumbar joint (Chronic) ICD-10-CM: M47.816  ICD-9-CM: 721.3  3/14/2016 - Present        Polyposis coli (Chronic) ICD-10-CM: D12.6  ICD-9-CM: 211.3  2/19/2016 - Present    Overview Addendum 4/07/8695  5:70 PM by MD Dr. Oracio Schulz 2016             Advanced care planning/counseling discussion (Chronic) ICD-10-CM: Z71.89  ICD-9-CM: V65.49  2/19/2016 - Present    Overview Signed 2/19/2016  2:36 PM by Maria G Clark RN     Patient states that a completed AMD is at home.  NN encouraged patient to bring a copy to the office for scanning into the medical record. Patient verbalized understanding. Chronic back pain (Chronic) ICD-10-CM: M54.9, G89.29  ICD-9-CM: 724.5, 338.29  1/14/2016 - Present        Acquired hypothyroidism (Chronic) ICD-10-CM: E03.9  ICD-9-CM: 244.9  1/14/2016 - Present        GERD (gastroesophageal reflux disease) (Chronic) ICD-10-CM: K21.9  ICD-9-CM: 530.81  1/14/2016 - Present        Diabetes mellitus type 2, controlled ([address] (Chronic) ICD-10-CM: E11.9  ICD-9-CM: 250.00  1/14/2016 - Present        Obese (Chronic) ICD-10-CM: E66.9  ICD-9-CM: 278.00  12/21/2011 - Present        Mixed hyperlipidemia (Chronic) ICD-10-CM: E78.2  ICD-9-CM: 272.2  10/2/2010 - Present        Other abnormal glucose (Chronic) ICD-10-CM: R73.09  ICD-9-CM: 790.29  6/17/2010 - Present        RESOLVED: Laceration ICD-10-CM: T14.8  ICD-9-CM: 879.8  10/21/2016 - 1/6/2017        RESOLVED: Chest pain ICD-10-CM: R07.9  ICD-9-CM: 786.50  4/29/2016 - 1/6/2017        RESOLVED: Cardiac dysrhythmia ICD-10-CM: I49.9  ICD-9-CM: 427.9  12/21/2011 - 1/6/2017        RESOLVED: ZHSQZQQV(825.3) ICD-9-CM: 784.0  12/21/2011 - 1/6/2017        RESOLVED: TIA (transient ischemic attack) ICD-10-CM: G45.9  ICD-9-CM: 435.9  12/21/2011 - 1/6/2017        RESOLVED: Diarrhea ICD-10-CM: R19.7  ICD-9-CM: 787.91 Chronic 12/21/2011 - 1/6/2017        RESOLVED: Insomnia, unspecified ICD-10-CM: G47.00  ICD-9-CM: 780.52  6/17/2010 - 1/6/2017              HPI:   Mr. Jillian Preston is a 66 y.o.  male who is admitted with afib with RVR. Mr. Jillian Preston presented to the [facility] Emergency Department this PM complaining of palpitations: today, acute onset, multiple episodes, on and off, felt like heart was \"racing,\" associated with weakness and dizziness, felt like he might pass out, wife reports he became confused at times. In the ED, he was observed to go into afib with RVR up to the 170s and was symptomatic at the time (Dr Ramila Thompson)    1/7/17: He is feeling well. Converted to NSR. Has been seen by Cards and has been cleared for discharge. Is going to be on Eliquis and Cardizem and followed in 1-2 weeks in the cardiology office. Will stay through tomorrow as the weather is not safe for driving. Review of Systems:   A comprehensive review of systems was negative except for that written in the HPI. Objective:   Physical Exam:     Visit Vitals    /64    Pulse 67    Temp 97.8 °F (36.6 °C)    Resp 19    Wt 214 lb 8.1 oz (97.3 kg)    SpO2 93%    BMI 33.6 kg/m2      O2 Device: Room air    Temp (24hrs), Av.1 °F (36.7 °C), Min:97.5 °F (36.4 °C), Max:98.5 °F (36.9 °C)    701 - 1900  In: 101 [I.V.:101]  Out: 550 [Urine:550]   1901 -  07  In: 807.8 [I.V.:807.8]  Out: 600 [Urine:600]    General:  Alert, cooperative, no distress, appears stated age. Head:  Normocephalic, without obvious abnormality, atraumatic. Eyes:  Conjunctivae/corneas clear. Nose: Nares normal. Septum midline. Mucosa normal. No drainage or sinus tenderness. Throat: Lips, mucosa, and tongue normal. Teeth and gums normal.   Neck: Supple, symmetrical, trachea midline, no adenopathy, thyroid: no enlargement/tenderness/nodules, no carotid bruit and no JVD. Lungs:   Clear to auscultation bilaterally. Heart:  Regular rate and rhythm, S1, S2 normal, no murmur, click, rub or gallop. Abdomen:   Soft, non-tender. Bowel sounds normal. No masses,  No organomegaly. Extremities: Extremities normal, atraumatic, no cyanosis or edema. No calf tenderness or cords. Pulses: 2+ and symmetric all extremities. Skin: Skin color, texture, turgor normal. No rashes or lesions   Neurologic: CNII-XII intact. Alert and oriented X 3. Fine motor of hands and fingers normal.   equal.  No cogwheeling or rigidity. Gait not tested at this time. Sensation grossly normal to touch.   Gross motor of extremities normal.       Data Review:       Recent Days:  Recent Labs      17   0513 01/06/17   1725   WBC  6.9  7.0   HGB  12.6  13.3   HCT  38.5  40.8   PLT  142*  161     Recent Labs      01/07/17   0524  01/06/17   1725   NA  142  141   K  3.9  3.9   CL  108  106   CO2  25  25   GLU  117*  161*   BUN  12  18   CREA  0.97  1.04   CA  8.0*  8.3*   MG  1.9   --    PHOS  3.6   --    ALB   --   3.6   TBILI   --   0.2   SGOT   --   53*   ALT   --   62   INR   --   1.0       24 Hour Results:  Recent Results (from the past 24 hour(s))   GLUCOSE, POC    Collection Time: 01/06/17  5:12 PM   Result Value Ref Range    Glucose (POC) 142 (H) 65 - 100 mg/dL    Performed by Chelsey Amin    CBC WITH AUTOMATED DIFF    Collection Time: 01/06/17  5:25 PM   Result Value Ref Range    WBC 7.0 4.1 - 11.1 K/uL    RBC 5.63 4.10 - 5.70 M/uL    HGB 13.3 12.1 - 17.0 g/dL    HCT 40.8 36.6 - 50.3 %    MCV 72.5 (L) 80.0 - 99.0 FL    MCH 23.6 (L) 26.0 - 34.0 PG    MCHC 32.6 30.0 - 36.5 g/dL    RDW 17.4 (H) 11.5 - 14.5 %    PLATELET 267 978 - 962 K/uL    NEUTROPHILS 46 32 - 75 %    LYMPHOCYTES 39 12 - 49 %    MONOCYTES 11 5 - 13 %    EOSINOPHILS 4 0 - 7 %    BASOPHILS 0 0 - 1 %    ABS. NEUTROPHILS 3.2 1.8 - 8.0 K/UL    ABS. LYMPHOCYTES 2.7 K/UL    ABS. MONOCYTES 0.8 0.0 - 1.0 K/UL    ABS. EOSINOPHILS 0.3 0.0 - 0.4 K/UL    ABS. BASOPHILS 0.0 0.0 - 0.1 K/UL    DF SMEAR SCANNED      RBC COMMENTS ANISOCYTOSIS  1+        WBC COMMENTS REACTIVE LYMPHS     METABOLIC PANEL, COMPREHENSIVE    Collection Time: 01/06/17  5:25 PM   Result Value Ref Range    Sodium 141 136 - 145 mmol/L    Potassium 3.9 3.5 - 5.1 mmol/L    Chloride 106 97 - 108 mmol/L    CO2 25 21 - 32 mmol/L    Anion gap 10 5 - 15 mmol/L    Glucose 161 (H) 65 - 100 mg/dL    BUN 18 6 - 20 MG/DL    Creatinine 1.04 0.70 - 1.30 MG/DL    BUN/Creatinine ratio 17 12 - 20      GFR est AA >60 >60 ml/min/1.73m2    GFR est non-AA >60 >60 ml/min/1.73m2    Calcium 8.3 (L) 8.5 - 10.1 MG/DL    Bilirubin, total 0.2 0.2 - 1.0 MG/DL    ALT 62 12 - 78 U/L    AST 53 (H) 15 - 37 U/L    Alk. phosphatase 125 (H) 45 - 117 U/L    Protein, total 6.8 6.4 - 8.2 g/dL    Albumin 3.6 3.5 - 5.0 g/dL    Globulin 3.2 2.0 - 4.0 g/dL    A-G Ratio 1.1 1.1 - 2.2     PROTHROMBIN TIME + INR    Collection Time: 01/06/17  5:25 PM   Result Value Ref Range    INR 1.0 0.9 - 1.1      Prothrombin time 10.1 9.0 - 11.1 sec   PTT    Collection Time: 01/06/17  5:25 PM   Result Value Ref Range    aPTT 25.3 22.1 - 32.5 sec    aPTT, therapeutic range     58.0 - 77.0 SECS   ETHYL ALCOHOL    Collection Time: 01/06/17  5:25 PM   Result Value Ref Range    ALCOHOL(ETHYL),SERUM <10 <10 MG/DL   TROPONIN I    Collection Time: 01/06/17  5:25 PM   Result Value Ref Range    Troponin-I, Qt. 0.07 <0.08 ng/mL   EKG, 12 LEAD, INITIAL    Collection Time: 01/06/17  5:39 PM   Result Value Ref Range    Ventricular Rate 148 BPM    Atrial Rate 113 BPM    QRS Duration 82 ms    Q-T Interval 298 ms    QTC Calculation (Bezet) 467 ms    Calculated R Axis -10 degrees    Calculated T Axis 24 degrees    Diagnosis       Atrial fibrillation with rapid ventricular response with premature   ventricular or aberrantly conducted complexes  Abnormal ECG  When compared with ECG of 06-JAN-2017 17:16,  MANUAL COMPARISON REQUIRED, DATA IS UNCONFIRMED     LACTIC ACID, PLASMA    Collection Time: 01/06/17  5:45 PM   Result Value Ref Range    Lactic acid 2.5 (HH) 0.4 - 2.0 MMOL/L   CULTURE, BLOOD, PAIRED    Collection Time: 01/06/17  5:45 PM   Result Value Ref Range    Special Requests: NO SPECIAL REQUESTS      Culture result: NO GROWTH AFTER 11 HOURS     URINALYSIS W/ REFLEX CULTURE    Collection Time: 01/06/17  6:22 PM   Result Value Ref Range    Color YELLOW/STRAW      Appearance CLEAR CLEAR      Specific gravity 1.010 1.003 - 1.030      pH (UA) 6.5 5.0 - 8.0      Protein NEGATIVE  NEG mg/dL    Glucose NEGATIVE  NEG mg/dL    Ketone NEGATIVE  NEG mg/dL    Bilirubin NEGATIVE  NEG      Blood TRACE (A) NEG      Urobilinogen 0.2 0.2 - 1.0 EU/dL    Nitrites NEGATIVE  NEG Leukocyte Esterase NEGATIVE  NEG      WBC 0-4 0 - 4 /hpf    RBC 0-5 0 - 5 /hpf    Epithelial cells FEW FEW /lpf    Bacteria NEGATIVE  NEG /hpf    UA:UC IF INDICATED CULTURE NOT INDICATED BY UA RESULT CNI     GLUCOSE, POC    Collection Time: 01/06/17  9:17 PM   Result Value Ref Range    Glucose (POC) 100 65 - 100 mg/dL    Performed by Rajwinder Ochoa    CK    Collection Time: 01/06/17 11:26 PM   Result Value Ref Range     39 - 308 U/L   TROPONIN I    Collection Time: 01/06/17 11:26 PM   Result Value Ref Range    Troponin-I, Qt. <0.04 <0.05 ng/mL   LACTIC ACID, PLASMA    Collection Time: 01/06/17 11:26 PM   Result Value Ref Range    Lactic acid 2.1 (HH) 0.4 - 2.0 MMOL/L   CBC W/O DIFF    Collection Time: 01/07/17  5:24 AM   Result Value Ref Range    WBC 6.9 4.1 - 11.1 K/uL    RBC 5.35 4.10 - 5.70 M/uL    HGB 12.6 12.1 - 17.0 g/dL    HCT 38.5 36.6 - 50.3 %    MCV 72.0 (L) 80.0 - 99.0 FL    MCH 23.6 (L) 26.0 - 34.0 PG    MCHC 32.7 30.0 - 36.5 g/dL    RDW 16.4 (H) 11.5 - 14.5 %    PLATELET 476 (L) 139 - 400 K/uL   MAGNESIUM    Collection Time: 01/07/17  5:24 AM   Result Value Ref Range    Magnesium 1.9 1.6 - 2.4 mg/dL   PHOSPHORUS    Collection Time: 01/07/17  5:24 AM   Result Value Ref Range    Phosphorus 3.6 2.6 - 4.7 MG/DL   METABOLIC PANEL, BASIC    Collection Time: 01/07/17  5:24 AM   Result Value Ref Range    Sodium 142 136 - 145 mmol/L    Potassium 3.9 3.5 - 5.1 mmol/L    Chloride 108 97 - 108 mmol/L    CO2 25 21 - 32 mmol/L    Anion gap 9 5 - 15 mmol/L    Glucose 117 (H) 65 - 100 mg/dL    BUN 12 6 - 20 MG/DL    Creatinine 0.97 0.70 - 1.30 MG/DL    BUN/Creatinine ratio 12 12 - 20      GFR est AA >60 >60 ml/min/1.73m2    GFR est non-AA >60 >60 ml/min/1.73m2    Calcium 8.0 (L) 8.5 - 10.1 MG/DL   TSH 3RD GENERATION    Collection Time: 01/07/17  5:24 AM   Result Value Ref Range    TSH 2.13 0.36 - 3.74 uIU/mL   CK    Collection Time: 01/07/17  5:24 AM   Result Value Ref Range     39 - 308 U/L   TROPONIN I Collection Time: 01/07/17  5:24 AM   Result Value Ref Range    Troponin-I, Qt. <0.04 <0.05 ng/mL   LACTIC ACID, PLASMA    Collection Time: 01/07/17  5:24 AM   Result Value Ref Range    Lactic acid 1.8 0.4 - 2.0 MMOL/L   GLUCOSE, POC    Collection Time: 01/07/17  7:28 AM   Result Value Ref Range    Glucose (POC) 116 (H) 65 - 100 mg/dL    Performed by Gregory Cisneros        Medications reviewed  Current Facility-Administered Medications   Medication Dose Route Frequency    dilTIAZem (CARDIZEM) IR tablet 30 mg  30 mg Oral TID    sodium chloride (NS) flush 5-10 mL  5-10 mL IntraVENous Q8H    sodium chloride (NS) flush 5-10 mL  5-10 mL IntraVENous PRN    acetaminophen (TYLENOL) tablet 650 mg  650 mg Oral Q4H PRN    oxyCODONE-acetaminophen (PERCOCET) 5-325 mg per tablet 1 Tab  1 Tab Oral Q4H PRN    HYDROmorphone (PF) (DILAUDID) injection 0.5 mg  0.5 mg IntraVENous Q4H PRN    enoxaparin (LOVENOX) injection 40 mg  40 mg SubCUTAneous Q24H    insulin lispro (HUMALOG) injection   SubCUTAneous QID WITH MEALS    glucose chewable tablet 16 g  4 Tab Oral PRN    dextrose (D50W) injection syrg 12.5-25 g  12.5-25 g IntraVENous PRN    glucagon (GLUCAGEN) injection 1 mg  1 mg IntraMUSCular PRN    prochlorperazine (COMPAZINE) with saline injection 5 mg  5 mg IntraVENous Q6H PRN    aspirin-dipyridamole (AGGRENOX)  mg per capsule 1 Cap  1 Cap Oral BID    levothyroxine (SYNTHROID) tablet 100 mcg  100 mcg Oral ACB    rosuvastatin (CRESTOR) tablet 10 mg  10 mg Oral QHS    pantoprazole (PROTONIX) tablet 40 mg  40 mg Oral ACB       Care Plan discussed with: Patient/Family, Nurse and Consultant Cardiology    Total time spent with patient and review of records: 30 minutes.     Jose Antonio Blake MD

## 2017-01-07 NOTE — ED NOTES
Time out: [facility]. Pt, wife, and [facility] aware of pt being transferred to [facility]. Wife is at [place] waiting on pt's arrival. Pt continues in NSR with HR of 83. Cardiac rhythm strip printed upon transfer. Pt transferred with cardizem drip at 10mg/hr via pump. Pt A&Ox4, transferred in stable condition and on cardiac monitor. EMTALA sent with pt. Wife took pt's clothes with her. Updated ROSEANNA Kennedy (nurse receiving pt) at [place] on pt's condition and that he normally would take his aggrenox and metformin this evening and hasn't taken them yet.

## 2017-01-07 NOTE — CONSULTS
Ousmane Patel MD    [address] [facility] [place], [address] [facility] [address]    Office [phone],QOZ [phone]  Pager [phone]    Date of  Admission: 1/6/2017  5:10 PM  PCP- Kirstin Sawant MD    Lara Carmona is a 66 y.o. male admitted for Atrial fibrillation with RVR ([facility] [address] Consult requested by Dean Tanner MD    Assessment/Plan    Afib with RVR - converted to SR with cardizem gtt  DM  HLD  Hx of TIA     Plan:  Transition to PO cardizem 30mg tid  On statin  DC Aggrenox/ ASA 81mg daily  ECHO - prelim EF 55%  [id number] 4 - high risk - recommend anticoagulation. Eliquis 5mg bid. Risks  of thromboembolism/CVA with afib d.w pt. Risks and benefits of anticoagulation d/w pt. Will need ischemia eval as OP. F/u with me in 2 weeks. Will make appt. Can be DC'd from cardiac standpoint         I appreciate the opportunity to be involved in [address] See below note for details. Please do not hesitate to contact us with questions or concerns. Ousmane Patel MD    Cardiac Testing/ Procedures: A. Cardiac Cath/PCI:    B.ECHO/JEANNETTE:    C.StressNuclear/Stress ECHO/Stress test:    D.Vascular:    E. EP:    F. Miscellaneous:    Care Plan discussed with:  Patient and Nursing Staff    Subjective:    Pt is a pleasant 66 yr old CM with hx of TIA ( times 2)/DM/HLD who felt his heart racing yesterday after waking up from a nap. No CP/dyspnea. Hx of presyncope. Hx of similar episodes of \"racing heart\" previously. Pt is very active ( is a [address]). No prior hx of CAD. No hx of ADRIANE. No hx of prior GI bleed. On PPI.      Past Medical History   Diagnosis Date    Acid reflux     Cardiac dysrhythmia, unspecified 12/21/2011    Diabetes ([facility] [address]     GERD (gastroesophageal reflux disease)     Headache(784.0) 12/21/2011    Hypercholesterolemia     Hypothyroid     Obesity     PUD (peptic ulcer disease)     TIA (transient ischemic attack)      2014      Past Surgical History Procedure Laterality Date    Hx gastrectomy      Hx tumor removal  2015     Left Eye    Hx mohs procedure Right      right shoulder    Hx heent       left retroorbital tumor resection     Allergies   Allergen Reactions    Ambien [Zolpidem] Other (comments)     Sleep walking  Doing things like cooking but he was not awake     Family History   Problem Relation Age of Onset    Diabetes Sister     Cancer Sister      lung    Diabetes Mother     Cancer Father      brain    Diabetes Maternal Grandmother     Hypertension Neg Hx       Social History   Substance Use Topics    Smoking status: Never Smoker    Smokeless tobacco: Never Used    Alcohol use No          Medications:  Prescriptions Prior to Admission   Medication Sig    rosuvastatin (CRESTOR) 20 mg tablet Take 10 mg by mouth nightly.  clobetasol (TEMOVATE) 0.05 % external solution Apply  to affected area two (2) times a day. Indications: DERMATOSIS OF THE SCALP    omeprazole (PRILOSEC) 20 mg capsule Take 20 mg by mouth daily.  triamcinolone acetonide (KENALOG) 0.1 % ointment Apply  to affected area three (3) times daily. use thin layer to affected area    levothyroxine (SYNTHROID) 100 mcg tablet TAKE 1 TABLET BY MOUTH EVERY DAY BEFORE BREAKFAST    FREESTYLE LITE STRIPS strip 100 Each by Does Not Apply route once over twenty-four (24) hours. Test blood sugar up to 3 times daily   Dx E11.9    metFORMIN (GLUCOPHAGE) 1,000 mg tablet Take 1,000 mg by mouth two (2) times daily (with meals).  FREESTYLE LANCETS 28 gauge misc 1 Package by IntraDERMal route once over twenty-four (24) hours.  dipyridamole-aspirin (AGGRENOX) 200-25 mg per SR capsule Take 1 Cap by mouth two (2) times a day.      Current Facility-Administered Medications   Medication Dose Route Frequency    dilTIAZem (CARDIZEM) IR tablet 30 mg  30 mg Oral TID    dilTIAZem (CARDIZEM) 125 mg in dextrose 5% 125 mL infusion  10 mg/hr IntraVENous TITRATE    0.9% sodium chloride infusion  75 mL/hr IntraVENous CONTINUOUS    sodium chloride (NS) flush 5-10 mL  5-10 mL IntraVENous Q8H    sodium chloride (NS) flush 5-10 mL  5-10 mL IntraVENous PRN    acetaminophen (TYLENOL) tablet 650 mg  650 mg Oral Q4H PRN    oxyCODONE-acetaminophen (PERCOCET) 5-325 mg per tablet 1 Tab  1 Tab Oral Q4H PRN    HYDROmorphone (PF) (DILAUDID) injection 0.5 mg  0.5 mg IntraVENous Q4H PRN    enoxaparin (LOVENOX) injection 40 mg  40 mg SubCUTAneous Q24H    insulin lispro (HUMALOG) injection   SubCUTAneous QID WITH MEALS    glucose chewable tablet 16 g  4 Tab Oral PRN    dextrose (D50W) injection syrg 12.5-25 g  12.5-25 g IntraVENous PRN    glucagon (GLUCAGEN) injection 1 mg  1 mg IntraMUSCular PRN    prochlorperazine (COMPAZINE) with saline injection 5 mg  5 mg IntraVENous Q6H PRN    aspirin-dipyridamole (AGGRENOX)  mg per capsule 1 Cap  1 Cap Oral BID    levothyroxine (SYNTHROID) tablet 100 mcg  100 mcg Oral ACB    rosuvastatin (CRESTOR) tablet 10 mg  10 mg Oral QHS    pantoprazole (PROTONIX) tablet 40 mg  40 mg Oral ACB         Review of Systems:  (bold if positive, if negative)    As in HPI- rest of ROS noncontributory          Physical Exam:  Visit Vitals    /68    Pulse 72    Temp 98.5 °F (36.9 °C)    Resp 28    Wt 214 lb 8.1 oz (97.3 kg)    SpO2 93%    BMI 33.6 kg/m2         Telemetry:  SR    Gen: Well-developed, well-nourished, in no acute distress  HEENT:  Pink conjunctivae, hearing intact to voice, moist mucous membranes  Neck: Supple,No JVD, No Carotid Bruit, Thyroid- non tender  Resp: No accessory muscle use, Clear breath sounds, No rales or rhonchi  Card: Regular Rate,Rythm,Normal S1, S2, No murmurs, rubs or gallop. No thrills.    Abd:  Soft, non-tender, non-distended, normoactive bowel sounds are present,   MSK: No cyanosis or clubbing  Skin: No rashes or ulcers  Neuro: moving all four extremities, no focal deficit, follows commands appropriately  Psych:  Good insight, oriented to person, place and time, alert, Nml Affect  LE: No edema  Vascular:Radial Pulses 2+ and symmetric        EKG:  Afib with RVR      Cxray:    LABS:        Lab Results   Component Value Date/Time    WBC 6.9 01/07/2017 05:24 AM    HGB 12.6 01/07/2017 05:24 AM    HCT 38.5 01/07/2017 05:24 AM    PLATELET 195 03/98/8290 05:24 AM    MCV 72.0 01/07/2017 05:24 AM     Lab Results   Component Value Date/Time    Sodium 142 01/07/2017 05:24 AM    Potassium 3.9 01/07/2017 05:24 AM    Chloride 108 01/07/2017 05:24 AM    CO2 25 01/07/2017 05:24 AM    Anion gap 9 01/07/2017 05:24 AM    Glucose 117 01/07/2017 05:24 AM    BUN 12 01/07/2017 05:24 AM    Creatinine 0.97 01/07/2017 05:24 AM    BUN/Creatinine ratio 12 01/07/2017 05:24 AM    GFR est AA >60 01/07/2017 05:24 AM    GFR est non-AA >60 01/07/2017 05:24 AM    Calcium 8.0 01/07/2017 05:24 AM     Lab Results   Component Value Date/Time     01/07/2017 05:24 AM    CK-MB Index 1.3 11/13/2012 04:32 PM    Troponin-I, Qt. <0.04 01/07/2017 05:24 AM    BNP 21 04/04/2012 06:05 PM     Lab Results   Component Value Date/Time    aPTT 25.3 01/06/2017 05:25 PM     Lab Results   Component Value Date/Time    INR 1.0 01/06/2017 05:25 PM    INR 1.0 04/04/2012 06:05 PM    Prothrombin time 10.1 01/06/2017 05:25 PM    Prothrombin time 10.1 04/04/2012 06:05 PM     Lab Results   Component Value Date/Time    BNP 21 04/04/2012 06:05 PM         Paige Monahan MD

## 2017-01-07 NOTE — PROGRESS NOTES
Problem: Afib Pathway: Day 2  Goal: Activity/Safety  Outcome: Progressing Towards Goal  Pt out of bed off of cardizem drip, tolerating activity

## 2017-01-07 NOTE — PROGRESS NOTES
TRANSFER - IN REPORT:    Verbal report received from Janett Austinamador Danny Farooqchai on [place]  being received from [facility] ED for routine progression of care      Report consisted of patients Situation, Background, Assessment and   Recommendations(SBAR). Information from the following report(s) SBAR, Kardex, ED Summary, Intake/Output, MAR, Accordion, Recent Results, Med Rec Status, Cardiac Rhythm SR and Alarm Parameters  was reviewed with the receiving nurse. Opportunity for questions and clarification was provided. Assessment completed upon patients arrival to unit and care assumed. Primary Nurse William Pinto RN and Blue Bangura RN performed a dual skin assessment on this patient No impairment noted , scattered abrasions present on legs. Mohan score is 23.  0545 Tylenol 650 mg po given for headache. 0710 Bedside shift change report given to ROSEANNA Wilson. Report included the following information SBAR, Kardex, ED Summary, Intake/Output, MAR, Accordion, Recent Results, Med Rec Status, Cardiac Rhythm SR and Alarm Parameters .

## 2017-01-07 NOTE — PROGRESS NOTES
Problem: Falls - Risk of  Goal: *Absence of falls  Outcome: Progressing Towards Goal  Pt has not fallen.

## 2017-01-07 NOTE — PROGRESS NOTES
[facility]: MED RECONCILIATION    Comments/Recommendations:   Patient states \"I don't need Clobetasol or Triamcinolone while here\". Medication(s) ADDED to PTA list:  1. Omeprazole 20 mg daily  2. Clobetasol soln   3. Triamcinolone ointment    Medication(s) REMOVED from PTA list:  1. none    Medication(s) ADJUSTED on PTA list:  1. Rosuvastatin dose corrected to 10 mg nightly. Information obtained from: Patient/ Rx Query    Significant PMH/Disease States:   Past Medical History   Diagnosis Date    Acid reflux     Cardiac dysrhythmia, unspecified 12/21/2011    Diabetes ([address]     GERD (gastroesophageal reflux disease)     Headache(784.0) 12/21/2011    Hypercholesterolemia     Hypothyroid     Obesity     PUD (peptic ulcer disease)     TIA (transient ischemic attack)      2014       Allergies: Ambien [zolpidem]    Prior to Admission Medications:     Medication Documentation Review Audit       Reviewed by Ramon Maddox PHARMD (Pharmacist) on 01/06/17 at 2141         Medication Sig Documenting Provider Last Dose Status Taking? clobetasol (TEMOVATE) 0.05 % external solution Apply  to affected area two (2) times a day. Indications: DERMATOSIS OF THE SCALP Historical Provider  Active Yes    dipyridamole-aspirin (AGGRENOX) 200-25 mg per SR capsule Take 1 Cap by mouth two (2) times a day. Stone Espinal MD 1/6/2017 0900 Active Yes    FREESTYLE LANCETS 28 gauge misc 1 Package by IntraDERMal route once over twenty-four (24) hours. Historical Provider 12/6/2016 Unknown time Active Yes             Med Note GORDON Szymanski   Thu Apr 7, 2016  1:39 PM): Received from: External Pharmacy      FREESTYLE LITE STRIPS strip 100 Each by Does Not Apply route once over twenty-four (24) hours.  Test blood sugar up to 3 times daily   Dx P43.6 Mandie Garner MD 30/2/8222 Unknown time Active Yes    levothyroxine (SYNTHROID) 100 mcg tablet TAKE 1 TABLET BY MOUTH EVERY DAY BEFORE Tim Blackwood MD 1/4/8541 Unknown time Active Yes    metFORMIN (GLUCOPHAGE) 1,000 mg tablet Take 1,000 mg by mouth two (2) times daily (with meals). Historical Provider 1/6/2017 0900 Active Yes    omeprazole (PRILOSEC) 20 mg capsule Take 20 mg by mouth daily. Historical Provider 1/6/2017 Unknown time Active Yes    rosuvastatin (CRESTOR) 20 mg tablet Take 10 mg by mouth nightly. Historical Provider 1/5/2017 Unknown time Active Yes    triamcinolone acetonide (KENALOG) 0.1 % ointment Apply  to affected area three (3) times daily.  use thin layer to affected area Historical Provider  Active Yes                        Vitaliy Martino   Contact: [phone]

## 2017-01-07 NOTE — ED NOTES
AIDET communication provided and informed of purposeful rounding to include collaboration of entire care team; patient acknowledged understanding. Pt remains in NSR with a rate of 76. cardizem drip infusing via pump at 10mg/hr as ordered, see MAR. Call bell within reach. Wife at bedside. Will continue to monitor. No further episodes of a-fib.

## 2017-01-08 VITALS
BODY MASS INDEX: 35.08 KG/M2 | WEIGHT: 223.99 LBS | HEART RATE: 62 BPM | RESPIRATION RATE: 12 BRPM | SYSTOLIC BLOOD PRESSURE: 161 MMHG | OXYGEN SATURATION: 97 % | TEMPERATURE: 97.8 F | DIASTOLIC BLOOD PRESSURE: 84 MMHG

## 2017-01-08 LAB
ATRIAL RATE: 113 BPM
ATRIAL RATE: 86 BPM
CALCULATED P AXIS, ECG09: 64 DEGREES
CALCULATED R AXIS, ECG10: -10 DEGREES
CALCULATED R AXIS, ECG10: 17 DEGREES
CALCULATED T AXIS, ECG11: 24 DEGREES
CALCULATED T AXIS, ECG11: 53 DEGREES
DIAGNOSIS, 93000: NORMAL
DIAGNOSIS, 93000: NORMAL
GLUCOSE BLD STRIP.AUTO-MCNC: 106 MG/DL (ref 65–100)
P-R INTERVAL, ECG05: 168 MS
Q-T INTERVAL, ECG07: 298 MS
Q-T INTERVAL, ECG07: 362 MS
QRS DURATION, ECG06: 82 MS
QRS DURATION, ECG06: 86 MS
QTC CALCULATION (BEZET), ECG08: 433 MS
QTC CALCULATION (BEZET), ECG08: 467 MS
SERVICE CMNT-IMP: ABNORMAL
VENTRICULAR RATE, ECG03: 148 BPM
VENTRICULAR RATE, ECG03: 86 BPM

## 2017-01-08 PROCEDURE — 74011250637 HC RX REV CODE- 250/637: Performed by: FAMILY MEDICINE

## 2017-01-08 PROCEDURE — 74011250637 HC RX REV CODE- 250/637: Performed by: INTERNAL MEDICINE

## 2017-01-08 PROCEDURE — 82962 GLUCOSE BLOOD TEST: CPT

## 2017-01-08 RX ORDER — DILTIAZEM HYDROCHLORIDE 30 MG/1
30 TABLET, FILM COATED ORAL 2 TIMES DAILY
Status: DISCONTINUED | OUTPATIENT
Start: 2017-01-08 | End: 2017-01-08 | Stop reason: HOSPADM

## 2017-01-08 RX ORDER — DILTIAZEM HYDROCHLORIDE 30 MG/1
30 TABLET, FILM COATED ORAL 2 TIMES DAILY
Qty: 60 TAB | Refills: 0 | Status: SHIPPED | OUTPATIENT
Start: 2017-01-08 | End: 2017-01-20 | Stop reason: DRUGHIGH

## 2017-01-08 RX ORDER — GUAIFENESIN 100 MG/5ML
81 LIQUID (ML) ORAL DAILY
Qty: 30 TAB | Refills: 0 | Status: SHIPPED | OUTPATIENT
Start: 2017-01-08

## 2017-01-08 RX ADMIN — PANTOPRAZOLE SODIUM 40 MG: 40 TABLET, DELAYED RELEASE ORAL at 09:09

## 2017-01-08 RX ADMIN — LEVOTHYROXINE SODIUM 100 MCG: 100 TABLET ORAL at 09:09

## 2017-01-08 RX ADMIN — ASPIRIN 81 MG CHEWABLE TABLET 81 MG: 81 TABLET CHEWABLE at 09:09

## 2017-01-08 RX ADMIN — APIXABAN 5 MG: 5 TABLET, FILM COATED ORAL at 09:09

## 2017-01-08 RX ADMIN — DILTIAZEM HYDROCHLORIDE 30 MG: 30 TABLET, FILM COATED ORAL at 09:09

## 2017-01-08 NOTE — PROGRESS NOTES
Bedside and Verbal shift change report given to Burton Holman RN (oncoming nurse) by Ester Mckeon RN (offgoing nurse). Report included the following information SBAR, Kardex, Intake/Output, MAR, Recent Results and Cardiac Rhythm NSR. Pt is resting in bed, denies any reports of pain or SOB, patient states, \"I'm better, when can I go home? \". Dr. Benito Anaya was called, and reported that she would be in this morning to discharge the patient. I have reviewed discharge instructions with the patient. The patient verbalized understanding. All questions were answered. All lines were removed. Pt was discharged to his ride home. Pt transported down in wheelchair by tech.

## 2017-01-08 NOTE — DISCHARGE SUMMARY
Physician Discharge Summary     Patient ID:    Rick Bone  [id number]  51 y.o.  1945  Sushma Gibson MD    Admit date: 1/6/2017    Discharge date and time: 1/8/2017    Admission Diagnoses: Atrial fibrillation with RVR ([facility]    Chronic Diagnoses:    Problem List as of 1/8/2017  Date Reviewed: 1/6/2017          Codes Class Noted - Resolved    * (Principal)Atrial fibrillation with RVR ([facility] ICD-10-CM: I48.91  ICD-9-CM: 427.31  1/6/2017 - Present        Troponin level elevated ICD-10-CM: R79.89  ICD-9-CM: 790.6  1/6/2017 - Present        Atopic dermatitis (Chronic) ICD-10-CM: L20.9  ICD-9-CM: 691.8  12/15/2016 - Present        Actinic keratosis (Chronic) ICD-10-CM: L57.0  ICD-9-CM: 702.0  11/15/2016 - Present        Skin cancer of nose (Chronic) ICD-10-CM: C44.301  ICD-9-CM: 173.30  4/18/2016 - Present        Spondylosis of lumbar joint (Chronic) ICD-10-CM: M47.816  ICD-9-CM: 721.3  3/14/2016 - Present        Polyposis coli (Chronic) ICD-10-CM: D12.6  ICD-9-CM: 211.3  2/19/2016 - Present    Overview Addendum 4/63/6025  7:38 PM by MD Dr. Car Carnes 2016             Advanced care planning/counseling discussion (Chronic) ICD-10-CM: Z71.89  ICD-9-CM: V65.49  2/19/2016 - Present    Overview Signed 2/19/2016  2:36 PM by Chilango Garza RN     Patient states that a completed AMD is at home. NN encouraged patient to bring a copy to the office for scanning into the medical record. Patient verbalized understanding.                Chronic back pain (Chronic) ICD-10-CM: M54.9, G89.29  ICD-9-CM: 724.5, 338.29  1/14/2016 - Present        Acquired hypothyroidism (Chronic) ICD-10-CM: E03.9  ICD-9-CM: 244.9  1/14/2016 - Present        GERD (gastroesophageal reflux disease) (Chronic) ICD-10-CM: K21.9  ICD-9-CM: 530.81  1/14/2016 - Present        Diabetes mellitus type 2, controlled ([facility] (Chronic) ICD-10-CM: E11.9  ICD-9-CM: 250.00  1/14/2016 - Present        Obese (Chronic) ICD-10-CM: E66.9  ICD-9-CM: 278.00  12/21/2011 - Present        Mixed hyperlipidemia (Chronic) ICD-10-CM: E78.2  ICD-9-CM: 272.2  10/2/2010 - Present        Other abnormal glucose (Chronic) ICD-10-CM: R73.09  ICD-9-CM: 790.29  6/17/2010 - Present        RESOLVED: Laceration ICD-10-CM: T14.8  ICD-9-CM: 879.8  10/21/2016 - 1/6/2017        RESOLVED: Chest pain ICD-10-CM: R07.9  ICD-9-CM: 786.50  4/29/2016 - 1/6/2017        RESOLVED: Cardiac dysrhythmia ICD-10-CM: I49.9  ICD-9-CM: 427.9  12/21/2011 - 1/6/2017        RESOLVED: MLKESELU(725.9) ICD-9-CM: 784.0  12/21/2011 - 1/6/2017        RESOLVED: TIA (transient ischemic attack) ICD-10-CM: G45.9  ICD-9-CM: 435.9  12/21/2011 - 1/6/2017        RESOLVED: Diarrhea ICD-10-CM: R19.7  ICD-9-CM: 787.91 Chronic 12/21/2011 - 1/6/2017        RESOLVED: Insomnia, unspecified ICD-10-CM: G47.00  ICD-9-CM: 780.52  6/17/2010 - 1/6/2017              Discharge Medications:   Current Discharge Medication List      START taking these medications    Details   apixaban (ELIQUIS) 5 mg tablet Take 1 Tab by mouth two (2) times a day. Qty: 60 Tab, Refills: 0      aspirin 81 mg chewable tablet Take 1 Tab by mouth daily. Qty: 30 Tab, Refills: 0      dilTIAZem (CARDIZEM) 30 mg tablet Take 1 Tab by mouth two (2) times a day. Qty: 60 Tab, Refills: 0         CONTINUE these medications which have NOT CHANGED    Details   rosuvastatin (CRESTOR) 20 mg tablet Take 10 mg by mouth nightly. clobetasol (TEMOVATE) 0.05 % external solution Apply  to affected area two (2) times a day. Indications: DERMATOSIS OF THE SCALP      omeprazole (PRILOSEC) 20 mg capsule Take 20 mg by mouth daily. triamcinolone acetonide (KENALOG) 0.1 % ointment Apply  to affected area three (3) times daily.  use thin layer to affected area      levothyroxine (SYNTHROID) 100 mcg tablet TAKE 1 TABLET BY MOUTH EVERY DAY BEFORE BREAKFAST  Qty: 90 Tab, Refills: 1    Associated Diagnoses: Acquired hypothyroidism      FREESTYLE LITE STRIPS strip 100 Each by Does Not Apply route once over twenty-four (24) hours. Test blood sugar up to 3 times daily   Dx E11.9  Qty: 100 Strip, Refills: 5      metFORMIN (GLUCOPHAGE) 1,000 mg tablet Take 1,000 mg by mouth two (2) times daily (with meals). Associated Diagnoses: Diabetes mellitus type 2, controlled (HCC)      FREESTYLE LANCETS 28 gauge misc 1 Package by IntraDERMal route once over twenty-four (24) hours. Refills: 4         STOP taking these medications       dipyridamole-aspirin (AGGRENOX) 200-25 mg per SR capsule Comments:   Reason for Stopping: Follow up Care:    1. Hanane Toney MD with in 1 weeks  2. Cardiology specialists as directed. They will call with appt time. Diet:  Cardiac Diet and Diabetic Diet    Disposition:  Home. Advanced Directive:    Discharge Exam:  [See today's progress note.]    CONSULTATIONS: Cardiology    Significant Diagnostic Studies:   Recent Labs      01/07/17   0524  01/06/17   1725   WBC  6.9  7.0   HGB  12.6  13.3   HCT  38.5  40.8   PLT  142*  161     Recent Labs      01/07/17   0524  01/06/17   1725   NA  142  141   K  3.9  3.9   CL  108  106   CO2  25  25   BUN  12  18   CREA  0.97  1.04   GLU  117*  161*   CA  8.0*  8.3*   MG  1.9   --    PHOS  3.6   --      Recent Labs      01/06/17   1725   SGOT  53*   ALT  62   AP  125*   TBILI  0.2   TP  6.8   ALB  3.6   GLOB  3.2     Recent Labs      01/06/17   1725   INR  1.0   PTP  10.1   APTT  25.3      No results for input(s): FE, TIBC, PSAT, FERR in the last 72 hours. No results for input(s): PH, PCO2, PO2 in the last 72 hours.   Recent Labs      01/07/17   0524  01/06/17   2326   CPK  254  297     Lab Results   Component Value Date/Time    Glucose (POC) 106 01/08/2017 07:49 AM    Glucose (POC) 116 01/07/2017 09:11 PM    Glucose (POC) 97 01/07/2017 04:09 PM    Glucose (POC) 119 01/07/2017 11:21 AM    Glucose (POC) 116 01/07/2017 07:28 AM    Glucose (POC) 99 11/13/2012 04:34 PM    Glucose (POC) 99 04/04/2012 06:03 PM     Lab Results   Component Value Date/Time    TSH 2.13 01/07/2017 05:24 AM    T4, Free 1.47 09/21/2016 10:26 AM       HOSPITAL COURSE & TREATMENT RENDERED:   Atrial fibrillation with RVR ([facility] (1/6/2017)  - started on diltiazem at [facility], will continue  - Cardiology has seen- will continue Cardizem 30mg BID and started Eliquis 5mg BID  - TSH normal      Mixed hyperlipidemia (10/2/2010)  - continue statin      Obese (12/21/2011)  - counseled on weight loss      GERD (gastroesophageal reflux disease) (1/14/2016)  - continue PPI      Diabetes mellitus type 2, controlled ([facility] (1/14/2016)  - follow BS on home meds with SSI  - A1c 7.0%- follow up with PCP      Elevated troponin  - minimally elevated, likely due to rate-related strain  - trend enzymes, echo as above     Hx TIA  - ASA 81mg daily to start     Code status:  - patient is FULL CODE      Problem List:  Problem List as of 1/8/2017  Date Reviewed: 1/6/2017             Codes Class Noted - Resolved     * (Principal)Atrial fibrillation with RVR ([facility] ICD-10-CM: I48.91  ICD-9-CM: 427.31   1/6/2017 - Present           Troponin level elevated ICD-10-CM: R79.89  ICD-9-CM: 790.6   1/6/2017 - Present           Atopic dermatitis (Chronic) ICD-10-CM: L20.9  ICD-9-CM: 691.8   12/15/2016 - Present           Actinic keratosis (Chronic) ICD-10-CM: L57.0  ICD-9-CM: 702.0   11/15/2016 - Present           Skin cancer of nose (Chronic) ICD-10-CM: C44.301  ICD-9-CM: 173.30   4/18/2016 - Present           Spondylosis of lumbar joint (Chronic) ICD-10-CM: M47.816  ICD-9-CM: 959. 3   3/14/2016 - Present           Polyposis coli (Chronic) ICD-10-CM: D12.6  ICD-9-CM: 211.3   2/19/2016 - Present     Overview Addendum 3/07/0479 7:80 PM by MD Dr. Tonya Mejia 2016               Advanced care planning/counseling discussion (Chronic) ICD-10-CM: Z71.89  ICD-9-CM: V65.49   2/19/2016 - Present     Overview Signed 2/19/2016 2:36 PM by Sharmila Salmeron RN       Patient states that a completed AMD is at home. NN encouraged patient to bring a copy to the office for scanning into the medical record. Patient verbalized understanding.                  Chronic back pain (Chronic) ICD-10-CM: M54.9, G89.29  ICD-9-CM: 724.5, 338.29   1/14/2016 - Present           Acquired hypothyroidism (Chronic) ICD-10-CM: E03.9  ICD-9-CM: 244. 9   1/14/2016 - Present           GERD (gastroesophageal reflux disease) (Chronic) ICD-10-CM: K21.9  ICD-9-CM: 530.81   1/14/2016 - Present           Diabetes mellitus type 2, controlled ([facility] (Chronic) ICD-10-CM: E11.9  ICD-9-CM: 250.00   1/14/2016 - Present           Obese (Chronic) ICD-10-CM: E66.9  ICD-9-CM: 278.00   12/21/2011 - Present           Mixed hyperlipidemia (Chronic) ICD-10-CM: E78.2  ICD-9-CM: 272.2   10/2/2010 - Present           Other abnormal glucose (Chronic) ICD-10-CM: R73.09  ICD-9-CM: 790.29   6/17/2010 - Present           RESOLVED: Laceration ICD-10-CM: T14.8  ICD-9-CM: 879.8   10/21/2016 - 1/6/2017           RESOLVED: Chest pain ICD-10-CM: R07.9  ICD-9-CM: 786.50   4/29/2016 - 1/6/2017           RESOLVED: Cardiac dysrhythmia ICD-10-CM: I49.9  ICD-9-CM: 427.9   12/21/2011 - 1/6/2017           RESOLVED: Headache(784.0) ICD-9-CM: 177. 0   12/21/2011 - 1/6/2017           RESOLVED: TIA (transient ischemic attack) ICD-10-CM: G45.9  ICD-9-CM: 435. 9   12/21/2011 - 1/6/2017           RESOLVED: Diarrhea ICD-10-CM: R19.7  ICD-9-CM: 787.91 Chronic 12/21/2011 - 1/6/2017           RESOLVED: Insomnia, unspecified ICD-10-CM: G47.00  ICD-9-CM: 780.52   6/17/2010 - 1/6/2017                  HPI:   Mr. Michelle Branch is a 66 y.o.  male who is admitted with afib with RVR. Mr. Michelle Branch presented to the [place] [address] Emergency Department this PM complaining of palpitations: today, acute onset, multiple episodes, on and off, felt like heart was \"racing,\" associated with weakness and dizziness, felt like he might pass out, wife reports he became confused at times.  In the ED, he was observed to go into afib with RVR up to the 170s and was symptomatic at the time (Dr Eun Lang)     17: He is feeling well. Converted to NSR. Has been seen by Cards and has been cleared for discharge. Is going to be on Eliquis and Cardizem and followed in 1-2 weeks in the cardiology office. Will stay through tomorrow as the weather is not safe for driving.      : Doing well. Ready to go home. Has been in NSR. A1c is 7.0%. He will follow up with PCP.      Review of Systems:   A comprehensive review of systems was negative except for that written in the HPI.     Objective:   Physical Exam:           Visit Vitals    /71    Pulse 62    Temp 97.8 °F (36.6 °C)    Resp 24    Wt 223 lb 15.8 oz (101.6 kg)    SpO2 90%    BMI 35.08 kg/m2      O2 Device: Room air     Temp (24hrs), Av °F (36.7 °C), Min:97.8 °F (36.6 °C), Max:98.5 °F (36.9 °C)      1901 -  0700  In: 1524.5 [P.O.:600; I.V.:924.5]  Out: 1150 [Urine:1150]     General:  Alert, cooperative, no distress, appears stated age. Head:  Normocephalic, without obvious abnormality, atraumatic. Eyes:  Conjunctivae/corneas clear. Nose: Nares normal. Septum midline. Mucosa normal. No drainage or sinus tenderness. Throat: Lips, mucosa, and tongue normal. Teeth and gums normal.   Neck: Supple, symmetrical, trachea midline, no adenopathy, thyroid: no enlargement/tenderness/nodules, no carotid bruit and no JVD. Lungs:  Clear to auscultation bilaterally. Heart:  Regular rate and rhythm, S1, S2 normal, no murmur, click, rub or gallop. Abdomen:  Soft, non-tender. Bowel sounds normal. No masses, No organomegaly. Extremities: Extremities normal, atraumatic, no cyanosis or edema. No calf tenderness or cords. Pulses: 2+ and symmetric all extremities. Skin: Skin color, texture, turgor normal. No rashes or lesions   Neurologic: CNII-XII intact. Alert and oriented X 3.  Fine motor of hands and fingers normal.  equal. No cogwheeling or rigidity. Gait not tested at this time. Sensation grossly normal to touch.  Gross motor of extremities normal.       Data Review:       Recent Days:       Recent Labs      01/07/17  0524 01/06/17  1725   WBC 6.9 7.0   HGB 12.6 13.3   HCT 38.5 40.8   * 161           Recent Labs      01/07/17  0524 01/06/17  1725    141   K 3.9 3.9    106   CO2 25 25   * 161*   BUN 12 18   CREA 0.97 1.04   CA 8.0* 8.3*   MG 1.9 --    PHOS 3.6 --    ALB --  3.6   TBILI --  0.2   SGOT --  53*   ALT --  62   INR --  1.0           Signed:  Diane Holliday MD  1/8/2017  9:13 AM

## 2017-01-08 NOTE — PROGRESS NOTES
Bedside and Verbal shift change report given to Jamie Hunter RN (oncoming nurse) by Tima Elliott (offgoing nurse). Report included the following information SBAR, Med Rec Status, Cardiac Rhythm SR and Alarm Parameters . 0630- Cardiology called overnight, Cardizem PO decreased from 3 tablets a day to 2 tablets a day for a total of 60 miligrams related to sinus bradycardia overnight. Last nights dose held.

## 2017-01-08 NOTE — DISCHARGE INSTRUCTIONS
Patient Discharge Instructions    Alida Draper / [id number] : 1945    Admitted 2017 Discharged: 2017 9:12 AM     ACUTE DIAGNOSES:  Atrial fibrillation with RVR ([facility]    CHRONIC MEDICAL DIAGNOSES:  Problem List as of 2017  Date Reviewed: 2017          Codes Class Noted - Resolved    * (Principal)Atrial fibrillation with RVR ([facility] ICD-10-CM: I48.91  ICD-9-CM: 427.31  2017 - Present        Troponin level elevated ICD-10-CM: R79.89  ICD-9-CM: 790.6  2017 - Present        Atopic dermatitis (Chronic) ICD-10-CM: L20.9  ICD-9-CM: 691.8  12/15/2016 - Present        Actinic keratosis (Chronic) ICD-10-CM: L57.0  ICD-9-CM: 702.0  11/15/2016 - Present        Skin cancer of nose (Chronic) ICD-10-CM: C44.301  ICD-9-CM: 173.30  2016 - Present        Spondylosis of lumbar joint (Chronic) ICD-10-CM: M47.816  ICD-9-CM: 721.3  3/14/2016 - Present        Polyposis coli (Chronic) ICD-10-CM: D12.6  ICD-9-CM: 211.3  2016 - Present    Overview Addendum   5:00 PM by MD Dr. Amara Lugo 2016             Advanced care planning/counseling discussion (Chronic) ICD-10-CM: Z71.89  ICD-9-CM: V65.49  2016 - Present    Overview Signed 2016  2:36 PM by Ollie Bardales RN     Patient states that a completed AMD is at home. NN encouraged patient to bring a copy to the office for scanning into the medical record. Patient verbalized understanding.                Chronic back pain (Chronic) ICD-10-CM: M54.9, G89.29  ICD-9-CM: 724.5, 338.29  2016 - Present        Acquired hypothyroidism (Chronic) ICD-10-CM: E03.9  ICD-9-CM: 244.9  2016 - Present        GERD (gastroesophageal reflux disease) (Chronic) ICD-10-CM: K21.9  ICD-9-CM: 530.81  2016 - Present        Diabetes mellitus type 2, controlled ([facility] (Chronic) ICD-10-CM: E11.9  ICD-9-CM: 250.00  2016 - Present        Obese (Chronic) ICD-10-CM: R83.4  ICD-9-CM: 278.00  2011 - Present        Mixed hyperlipidemia (Chronic) ICD-10-CM: E78.2  ICD-9-CM: 272.2  10/2/2010 - Present        Other abnormal glucose (Chronic) ICD-10-CM: R73.09  ICD-9-CM: 790.29  6/17/2010 - Present        RESOLVED: Laceration ICD-10-CM: T14.8  ICD-9-CM: 879.8  10/21/2016 - 1/6/2017        RESOLVED: Chest pain ICD-10-CM: R07.9  ICD-9-CM: 786.50  4/29/2016 - 1/6/2017        RESOLVED: Cardiac dysrhythmia ICD-10-CM: I49.9  ICD-9-CM: 427.9  12/21/2011 - 1/6/2017        RESOLVED: QJIOZXEC(774.5) ICD-9-CM: 784.0  12/21/2011 - 1/6/2017        RESOLVED: TIA (transient ischemic attack) ICD-10-CM: G45.9  ICD-9-CM: 435.9  12/21/2011 - 1/6/2017        RESOLVED: Diarrhea ICD-10-CM: R19.7  ICD-9-CM: 787.91 Chronic 12/21/2011 - 1/6/2017        RESOLVED: Insomnia, unspecified ICD-10-CM: G47.00  ICD-9-CM: 780.52  6/17/2010 - 1/6/2017              DISCHARGE MEDICATIONS:         · It is important that you take the medication exactly as they are prescribed. · Keep your medication in the bottles provided by the pharmacist and keep a list of the medication names, dosages, and times to be taken in your wallet. · Do not take other medications without consulting your doctor. DIET:  Cardiac Diet    ACTIVITY: Activity as tolerated    ADDITIONAL INFORMATION: If you experience any of the following symptoms then please call your primary care physician or return to the emergency room if you cannot get hold of your doctor: Fever, chills, nausea, vomiting, diarrhea, change in mentation, falling, bleeding, shortness of breath. FOLLOW UP CARE:  Dr. Nelson Curtis MD  you are to call and set up an appointment to see them with in 1 week. Follow-up with specialists at directed by them      Information obtained by :  I understand that if any problems occur once I am at home I am to contact my physician. I understand and acknowledge receipt of the instructions indicated above. Physician's or R.N.'s Signature                                                                  Date/Time                                                                                                                                              Patient or Representative Signature                                                          Date/Time

## 2017-01-08 NOTE — PROGRESS NOTES
Daily Progress Note: 1/8/2017  Ariana Hamilton MD    Assessment/Plan:   Atrial fibrillation with RVR ([facility] (1/6/2017)  - started on diltiazem at [facility], will continue  - Cardiology has seen- will continue Cardizem 30mg BID and started Eliquis 5mg BID  - TSH  normal     Mixed hyperlipidemia (10/2/2010)  - continue statin     Obese (12/21/2011)  - counseled on weight loss     GERD (gastroesophageal reflux disease) (1/14/2016)  - continue PPI     Diabetes mellitus type 2, controlled ([facility] (1/14/2016)  - follow BS on home meds with SSI  - A1c 7.0%- follow up with PCP     Elevated troponin  - minimally elevated, likely due to rate-related strain  - trend enzymes, echo as above    Hx TIA  - ASA 81mg daily to start    Code status:  - patient is FULL CODE       Problem List:  Problem List as of 1/8/2017  Date Reviewed: 1/6/2017          Codes Class Noted - Resolved    * (Principal)Atrial fibrillation with RVR ([facility] ICD-10-CM: I48.91  ICD-9-CM: 427.31  1/6/2017 - Present        Troponin level elevated ICD-10-CM: R79.89  ICD-9-CM: 790.6  1/6/2017 - Present        Atopic dermatitis (Chronic) ICD-10-CM: L20.9  ICD-9-CM: 691.8  12/15/2016 - Present        Actinic keratosis (Chronic) ICD-10-CM: L57.0  ICD-9-CM: 702.0  11/15/2016 - Present        Skin cancer of nose (Chronic) ICD-10-CM: C44.301  ICD-9-CM: 173.30  4/18/2016 - Present        Spondylosis of lumbar joint (Chronic) ICD-10-CM: M47.816  ICD-9-CM: 721.3  3/14/2016 - Present        Polyposis coli (Chronic) ICD-10-CM: D12.6  ICD-9-CM: 211.3  2/19/2016 - Present    Overview Addendum 3/52/1535  5:01 PM by MD Dr. Rodney Murphy 2016             Advanced care planning/counseling discussion (Chronic) ICD-10-CM: Z71.89  ICD-9-CM: V65.49  2/19/2016 - Present    Overview Signed 2/19/2016  2:36 PM by Jacqueline Hashimoto, RN     Patient states that a completed AMD is at home.  NN encouraged patient to bring a copy to the office for scanning into the medical record. Patient verbalized understanding. Chronic back pain (Chronic) ICD-10-CM: M54.9, G89.29  ICD-9-CM: 724.5, 338.29  1/14/2016 - Present        Acquired hypothyroidism (Chronic) ICD-10-CM: E03.9  ICD-9-CM: 244.9  1/14/2016 - Present        GERD (gastroesophageal reflux disease) (Chronic) ICD-10-CM: K21.9  ICD-9-CM: 530.81  1/14/2016 - Present        Diabetes mellitus type 2, controlled ([address] (Chronic) ICD-10-CM: E11.9  ICD-9-CM: 250.00  1/14/2016 - Present        Obese (Chronic) ICD-10-CM: E66.9  ICD-9-CM: 278.00  12/21/2011 - Present        Mixed hyperlipidemia (Chronic) ICD-10-CM: E78.2  ICD-9-CM: 272.2  10/2/2010 - Present        Other abnormal glucose (Chronic) ICD-10-CM: R73.09  ICD-9-CM: 790.29  6/17/2010 - Present        RESOLVED: Laceration ICD-10-CM: T14.8  ICD-9-CM: 879.8  10/21/2016 - 1/6/2017        RESOLVED: Chest pain ICD-10-CM: R07.9  ICD-9-CM: 786.50  4/29/2016 - 1/6/2017        RESOLVED: Cardiac dysrhythmia ICD-10-CM: I49.9  ICD-9-CM: 427.9  12/21/2011 - 1/6/2017        RESOLVED: NFMFGMWG(733.7) ICD-9-CM: 784.0  12/21/2011 - 1/6/2017        RESOLVED: TIA (transient ischemic attack) ICD-10-CM: G45.9  ICD-9-CM: 435.9  12/21/2011 - 1/6/2017        RESOLVED: Diarrhea ICD-10-CM: R19.7  ICD-9-CM: 787.91 Chronic 12/21/2011 - 1/6/2017        RESOLVED: Insomnia, unspecified ICD-10-CM: G47.00  ICD-9-CM: 780.52  6/17/2010 - 1/6/2017              HPI:   Mr. Luis Ballard is a 66 y.o.  male who is admitted with afib with RVR. Mr. Luis Ballard presented to the [place] [address] Emergency Department this PM complaining of palpitations: today, acute onset, multiple episodes, on and off, felt like heart was \"racing,\" associated with weakness and dizziness, felt like he might pass out, wife reports he became confused at times. In the ED, he was observed to go into afib with RVR up to the 170s and was symptomatic at the time (Dr Niesha Schwartz)    1/7/17: He is feeling well. Converted to NSR.  Has been seen by Cards and has been cleared for discharge. Is going to be on Eliquis and Cardizem and followed in 1-2 weeks in the cardiology office. Will stay through tomorrow as the weather is not safe for driving. : Doing well. Ready to go home. Has been in NSR. A1c is 7.0%. He will follow up with PCP. Review of Systems:   A comprehensive review of systems was negative except for that written in the HPI. Objective:   Physical Exam:     Visit Vitals    /71    Pulse 62    Temp 97.8 °F (36.6 °C)    Resp 24    Wt 223 lb 15.8 oz (101.6 kg)    SpO2 90%    BMI 35.08 kg/m2      O2 Device: Room air    Temp (24hrs), Av °F (36.7 °C), Min:97.8 °F (36.6 °C), Max:98.5 °F (36.9 °C)         1901 -  0700  In: 1524.5 [P.O.:600; I.V.:924.5]  Out: 1150 [Urine:1150]    General:  Alert, cooperative, no distress, appears stated age. Head:  Normocephalic, without obvious abnormality, atraumatic. Eyes:  Conjunctivae/corneas clear. Nose: Nares normal. Septum midline. Mucosa normal. No drainage or sinus tenderness. Throat: Lips, mucosa, and tongue normal. Teeth and gums normal.   Neck: Supple, symmetrical, trachea midline, no adenopathy, thyroid: no enlargement/tenderness/nodules, no carotid bruit and no JVD. Lungs:   Clear to auscultation bilaterally. Heart:  Regular rate and rhythm, S1, S2 normal, no murmur, click, rub or gallop. Abdomen:   Soft, non-tender. Bowel sounds normal. No masses,  No organomegaly. Extremities: Extremities normal, atraumatic, no cyanosis or edema. No calf tenderness or cords. Pulses: 2+ and symmetric all extremities. Skin: Skin color, texture, turgor normal. No rashes or lesions   Neurologic: CNII-XII intact. Alert and oriented X 3. Fine motor of hands and fingers normal.   equal.  No cogwheeling or rigidity. Gait not tested at this time. Sensation grossly normal to touch.   Gross motor of extremities normal.       Data Review:       Recent Days:  Recent Labs      01/07/17   0524  01/06/17   1725   WBC  6.9  7.0   HGB  12.6  13.3   HCT  38.5  40.8   PLT  142*  161     Recent Labs      01/07/17   0524  01/06/17   1725   NA  142  141   K  3.9  3.9   CL  108  106   CO2  25  25   GLU  117*  161*   BUN  12  18   CREA  0.97  1.04   CA  8.0*  8.3*   MG  1.9   --    PHOS  3.6   --    ALB   --   3.6   TBILI   --   0.2   SGOT   --   53*   ALT   --   62   INR   --   1.0       24 Hour Results:  Recent Results (from the past 24 hour(s))   GLUCOSE, POC    Collection Time: 01/07/17 11:21 AM   Result Value Ref Range    Glucose (POC) 119 (H) 65 - 100 mg/dL    Performed by [facility], POC    Collection Time: 01/07/17  4:09 PM   Result Value Ref Range    Glucose (POC) 97 65 - 100 mg/dL    Performed by [facility] [place] [address], POC    Collection Time: 01/07/17  9:11 PM   Result Value Ref Range    Glucose (POC) 116 (H) 65 - 100 mg/dL    Performed by Levon Hammans    GLUCOSE, POC    Collection Time: 01/08/17  7:49 AM   Result Value Ref Range    Glucose (POC) 106 (H) 65 - 100 mg/dL    Performed by Yehuda Segovia        Medications reviewed  Current Facility-Administered Medications   Medication Dose Route Frequency    dilTIAZem (CARDIZEM) IR tablet 30 mg  30 mg Oral BID    aspirin chewable tablet 81 mg  81 mg Oral DAILY    apixaban (ELIQUIS) tablet 5 mg  5 mg Oral BID    sodium chloride (NS) flush 5-10 mL  5-10 mL IntraVENous Q8H    sodium chloride (NS) flush 5-10 mL  5-10 mL IntraVENous PRN    acetaminophen (TYLENOL) tablet 650 mg  650 mg Oral Q4H PRN    oxyCODONE-acetaminophen (PERCOCET) 5-325 mg per tablet 1 Tab  1 Tab Oral Q4H PRN    HYDROmorphone (PF) (DILAUDID) injection 0.5 mg  0.5 mg IntraVENous Q4H PRN    insulin lispro (HUMALOG) injection   SubCUTAneous QID WITH MEALS    glucose chewable tablet 16 g  4 Tab Oral PRN    dextrose (D50W) injection syrg 12.5-25 g  12.5-25 g IntraVENous PRN    glucagon (GLUCAGEN) injection 1 mg  1 mg IntraMUSCular PRN    prochlorperazine (COMPAZINE) with saline injection 5 mg  5 mg IntraVENous Q6H PRN    levothyroxine (SYNTHROID) tablet 100 mcg  100 mcg Oral ACB    rosuvastatin (CRESTOR) tablet 10 mg  10 mg Oral QHS    pantoprazole (PROTONIX) tablet 40 mg  40 mg Oral ACB       Care Plan discussed with: Patient/Family, Nurse and Consultant Cardiology    Total time spent with patient and review of records: 30 minutes.     Carl Swann MD

## 2017-01-08 NOTE — CARDIO/PULMONARY
Cardiac Rehab:  1/8/2017 @ 0955:  Recieved verbal consult [facility] Dr Norm[facility]. Met with patient, who was sitting up in hair on ICU. Pt is for d/c today. Explained educational role of Cardiac Rehab RN. Cardiac Meds:    Statin: rosuvastatin   ASA: Started on 81 mg  Prior to admission cardiac meds: dipyridamole-aspirin (Med stopped)  Discussed pt's understanding of current health issues, treatment and plan of care. Pt reported he is aware of Afib and discussed s/s of palpitation and \"not feeling right. \"   Explained new AFib, increased risk of CVA/PE/MI, need for anticoagulation, and new meds. Gave/reviewed handouts [facility]  AFib, having a healthy heart, [facility] Eating for Healthier Heart/low salt diet, and safe use of antiocoagulation meds. Discussed new meds: Diltiazem, ASA and Eliquis. Discussed stopping Aggronox. Provided handouts on new meds to AVS.  Provided coupons for Eliquis and discussed activation process. Pt verbalized understanding of info provided, and all questions were answered.

## 2017-01-12 LAB
BACTERIA SPEC CULT: NORMAL
SERVICE CMNT-IMP: NORMAL

## 2017-01-16 ENCOUNTER — CLINICAL SUPPORT (OUTPATIENT)
Dept: CARDIOLOGY CLINIC | Age: 72
End: 2017-01-16

## 2017-01-16 DIAGNOSIS — I48.91 ATRIAL FIBRILLATION WITH RVR (HCC): ICD-10-CM

## 2017-01-16 DIAGNOSIS — E78.2 MIXED HYPERLIPIDEMIA: Chronic | ICD-10-CM

## 2017-01-16 DIAGNOSIS — R07.9 CHEST PAIN, UNSPECIFIED TYPE: Primary | ICD-10-CM

## 2017-01-16 NOTE — PROGRESS NOTES
See scanned report. Dr. Nida Fernandez ordered and Dr. Nida Fernandez read study. ID verified per protocol.

## 2017-01-20 ENCOUNTER — DOCUMENTATION ONLY (OUTPATIENT)
Dept: CARDIOLOGY CLINIC | Age: 72
End: 2017-01-20

## 2017-01-20 ENCOUNTER — OFFICE VISIT (OUTPATIENT)
Dept: CARDIOLOGY CLINIC | Age: 72
End: 2017-01-20

## 2017-01-20 VITALS
RESPIRATION RATE: 20 BRPM | SYSTOLIC BLOOD PRESSURE: 128 MMHG | WEIGHT: 205 LBS | BODY MASS INDEX: 32.18 KG/M2 | HEIGHT: 67 IN | DIASTOLIC BLOOD PRESSURE: 70 MMHG | HEART RATE: 60 BPM | OXYGEN SATURATION: 96 %

## 2017-01-20 DIAGNOSIS — I48.0 PAROXYSMAL ATRIAL FIBRILLATION (HCC): Primary | ICD-10-CM

## 2017-01-20 DIAGNOSIS — Z79.01 CHRONIC ANTICOAGULATION: ICD-10-CM

## 2017-01-20 RX ORDER — DILTIAZEM HYDROCHLORIDE 120 MG/1
CAPSULE, COATED, EXTENDED RELEASE ORAL DAILY
COMMUNITY
End: 2017-01-20 | Stop reason: SDUPTHER

## 2017-01-20 RX ORDER — DILTIAZEM HYDROCHLORIDE 120 MG/1
120 CAPSULE, COATED, EXTENDED RELEASE ORAL DAILY
Qty: 30 CAP | Refills: 3 | Status: SHIPPED | OUTPATIENT
Start: 2017-01-20 | End: 2018-01-12 | Stop reason: DRUGHIGH

## 2017-01-20 RX ORDER — DILTIAZEM HYDROCHLORIDE 120 MG/1
120 TABLET, FILM COATED ORAL 4 TIMES DAILY
COMMUNITY
End: 2018-01-12 | Stop reason: DRUGHIGH

## 2017-01-20 RX ORDER — DILTIAZEM HYDROCHLORIDE 120 MG/1
120 CAPSULE, COATED, EXTENDED RELEASE ORAL DAILY
Qty: 30 CAP | Refills: 3 | Status: SHIPPED | OUTPATIENT
Start: 2017-01-20 | End: 2017-01-20 | Stop reason: SDUPTHER

## 2017-01-20 NOTE — MR AVS SNAPSHOT
Visit Information Date & Time Provider Department Dept. Phone Encounter #  
 1/20/2017 10:20 AM Holden Gamboa MD [facility] [place] [phone] [id number] Your Appointments 7/21/2017 10:00 AM  
ESTABLISHED PATIENT with Holden Gamboa MD  
[facility] ([address]) Appt Note: 1 yr fu appt sll; 6mo f/u  
 [address] [facility] [address] Upcoming Health Maintenance Date Due  
 EYE EXAM RETINAL OR DILATED Q1 1/11/2017 MEDICARE YEARLY EXAM 2/19/2017 FOOT EXAM Q1 3/14/2017 MICROALBUMIN Q1 3/14/2017 HEMOGLOBIN A1C Q6M 7/6/2017 FOBT Q 1 YEAR AGE 50-75 8/10/2017 LIPID PANEL Q1 9/21/2017 GLAUCOMA SCREENING Q2Y 1/11/2018 DTaP/Tdap/Td series (2 - Td) 1/1/2020 Allergies as of 1/20/2017  Review Complete On: 1/20/2017 By: Bambi Acosta LPN Severity Noted Reaction Type Reactions Ambien [Zolpidem] High 04/07/2016   Side Effect Other (comments) Sleep walking Doing things like cooking but he was not awake Current Immunizations  Reviewed on 6/20/2016 Name Date Influenza High Dose Vaccine PF 9/21/2016 10:51 AM  
 Influenza Vaccine 11/1/2015, 10/2/2013 Pneumococcal Polysaccharide (PPSV-23) 1/1/2014 TB Skin Test (PPD) 1/1/2009 Tdap 1/1/2010 Zoster Vaccine, Live 3/15/2016 Not reviewed this visit You Were Diagnosed With   
  
 Codes Comments Paroxysmal atrial fibrillation (HCC)    -  Primary ICD-10-CM: I48.0 ICD-9-CM: 427.31 Vitals BP Pulse Resp Height(growth percentile) Weight(growth percentile) SpO2  
 128/70 (BP 1 Location: Left arm, BP Patient Position: Sitting) 60 20 5' 7\" (1.702 m) 205 lb (93 kg) 96% BMI Smoking Status 32.11 kg/m2 Never Smoker Vitals History BMI and BSA Data Body Mass Index Body Surface Area  
 32.11 kg/m 2 2.1 m 2 Preferred Pharmacy Pharmacy Name Phone [id number] [address], [id number] [place] [address]  [phone] Your Updated Medication List  
  
   
This list is accurate as of: 1/20/17 11:18 AM.  Always use your most recent med list.  
  
  
  
  
 apixaban 5 mg tablet Commonly known as:  Rosalina Shield Take 1 Tab by mouth two (2) times a day. aspirin 81 mg chewable tablet Take 1 Tab by mouth daily. clobetasol 0.05 % external solution Commonly known as:  Harrie Reagin Apply  to affected area two (2) times a day. Indications: DERMATOSIS OF THE SCALP  
  
 FREESTYLE LANCETS 28 gauge Misc Generic drug:  lancets 1 Package by IntraDERMal route once over twenty-four (24) hours. FREESTYLE LITE STRIPS strip Generic drug:  glucose blood VI test strips 100 Each by Does Not Apply route once over twenty-four (24) hours. Test blood sugar up to 3 times daily   Dx E11.9  
  
 levothyroxine 100 mcg tablet Commonly known as:  SYNTHROID  
TAKE 1 TABLET BY MOUTH EVERY DAY BEFORE BREAKFAST  
  
 metFORMIN 1,000 mg tablet Commonly known as:  GLUCOPHAGE Take 1,000 mg by mouth two (2) times daily (with meals). omeprazole 20 mg capsule Commonly known as:  PRILOSEC Take 20 mg by mouth daily. rosuvastatin 20 mg tablet Commonly known as:  CRESTOR Take 10 mg by mouth nightly. triamcinolone acetonide 0.1 % ointment Commonly known as:  KENALOG Apply  to affected area three (3) times daily. use thin layer to affected area We Performed the Following AMB POC EKG ROUTINE W/ 12 LEADS, INTER & REP [57760 CPT(R)] Introducing \[facility]\"" & HEALTH SERVICES! Dear Clark Senior: Thank you for requesting a [employer] account. Our records indicate that you already have an active [email or web address] account. You can access your account anytime at [email or web address] Did you know that you can access your hospital and ER discharge instructions at any time in [employer]? You can also review all of your test results from your hospital stay or ER visit. Additional Information If you have questions, please visit the Frequently Asked Questions section of the [employer] website at [email or web address] Remember, [employer] is NOT to be used for urgent needs. For medical emergencies, dial 911. Now available from your iPhone and Android! Please provide this summary of care documentation to your next provider. Your primary care clinician is listed as Fallon Peres. If you have any questions after today's visit, please call [phone].

## 2017-01-20 NOTE — PROGRESS NOTES
Kendra Nash MD    [address] [facility] [place], [address] [facility]    Office [phone],EBQ [phone]  Pager [phone]    Rick Bone is a 66 y.o. male is here for f/u visit for Afib    Primary care physician:  Tigist Ramos MD    Patient Active Problem List   Diagnosis Code    Other abnormal glucose R73.09    Mixed hyperlipidemia E78.2    Obese E66.9    Chronic back pain M54.9, G89.29    Acquired hypothyroidism E03.9    GERD (gastroesophageal reflux disease) K21.9    Diabetes mellitus type 2, controlled ([address] E11.9    Polyposis coli D12.6    Advanced care planning/counseling discussion Z71.89    Spondylosis of lumbar joint M47.816    Skin cancer of nose C44.301    Actinic keratosis L57.0    Atopic dermatitis L20.9    Atrial fibrillation with RVR ([address] I48.91    Troponin level elevated R79.89       Chief complaint:  Chief Complaint   Patient presents with   Birder Moron Irregular [address] 66 D/c       Assessment:  PAF - 1/7/17Afib with RVR - converted to SR with cardizem gtt  DM  HLD  Hx of TIA       Plan:   Switch Cardizem to Cardizem  mg daily. If his heart rate tends to run low we may have to switch back to Cardizem 30 twice daily. Continue Eliquis. Aggressive cardiovascular risk for modification. Follow-up in 3 months. Patient understands the plan. All questions were answered to the patient's satisfaction. Medication Side Effects and Warnings were discussed with patient: yes  Patient Labs were reviewed and or requested:  yes  Patient Past Records were reviewed and or requested: yes    I appreciate the opportunity to be involved in [address] See note below for details. Please do not hesitate to contact us with questions or concerns. Kendra Nash MD    Cardiac Testing/ Procedures: A. Cardiac Cath/PCI:    B.ECHO/JEANNETTE: 1/6/17 Left ventricle: Systolic function was normal. Ejection fraction was  estimated in the range of 55 % to 60 %. There were no regional wall motion  abnormalities. Aortic valve: There was mild regurgitation. C.StressNuclear/Stress ECHO/Stress test:    D.Vascular:    E. EP:    F. Miscellaneous:    Subjective: Sid Rock is a 66 y.o. male who returns for follow up visit. No chest pain, dizziness, syncope, dyspnea. Patient participates in the Karate and understands the risks of being on anticoagulation . ROS:  (bold if positive, if negative)             Medications before admission:    Current Outpatient Prescriptions   Medication Sig Dispense    apixaban (ELIQUIS) 5 mg tablet Take 1 Tab by mouth two (2) times a day. 60 Tab    aspirin 81 mg chewable tablet Take 1 Tab by mouth daily. 30 Tab    dilTIAZem (CARDIZEM) 30 mg tablet Take 1 Tab by mouth two (2) times a day. 60 Tab    rosuvastatin (CRESTOR) 20 mg tablet Take 10 mg by mouth nightly.  omeprazole (PRILOSEC) 20 mg capsule Take 20 mg by mouth daily.  levothyroxine (SYNTHROID) 100 mcg tablet TAKE 1 TABLET BY MOUTH EVERY DAY BEFORE BREAKFAST 90 Tab    metFORMIN (GLUCOPHAGE) 1,000 mg tablet Take 1,000 mg by mouth two (2) times daily (with meals).  clobetasol (TEMOVATE) 0.05 % external solution Apply  to affected area two (2) times a day. Indications: DERMATOSIS OF THE SCALP     triamcinolone acetonide (KENALOG) 0.1 % ointment Apply  to affected area three (3) times daily. use thin layer to affected area     FREESTYLE LITE STRIPS strip 100 Each by Does Not Apply route once over twenty-four (24) hours. Test blood sugar up to 3 times daily   Dx E11.9 100 Strip    FREESTYLE LANCETS 28 gauge misc 1 Package by IntraDERMal route once over twenty-four (24) hours. No current facility-administered medications for this visit.         Physical Exam:  Visit Vitals    /70 (BP 1 Location: Left arm, BP Patient Position: Sitting)    Pulse 60    Resp 20    Ht 5' 7\" (1.702 m)    Wt 205 lb (93 kg)    SpO2 96%    BMI 32.11 kg/m2          Gen: Well-developed, well-nourished, in no acute distress  Neck: Supple,No JVD, No Carotid Bruit,   Resp: No accessory muscle use, Clear breath sounds, No rales or rhonchi  Card: Regular Rate,Rythm,Normal S1, S2, No murmurs, rubs or gallop. No thrills.    Abd:  Soft, non-tender, non-distended,BS+,   MSK: No cyanosis  Skin: No rashes    Neuro: moving all four extremities , follows commands appropriately  Psych:  Good insight, oriented to person, place , alert, Nml Affect  LE: No edema    EKG: Sinus rhythm, normal axis, normal intervals      LABS:        Lab Results   Component Value Date/Time    WBC 6.9 01/07/2017 05:24 AM    HGB 12.6 01/07/2017 05:24 AM    HCT 38.5 01/07/2017 05:24 AM    PLATELET 020 29/28/9912 05:24 AM    MCV 72.0 01/07/2017 05:24 AM     Lab Results   Component Value Date/Time    Sodium 142 01/07/2017 05:24 AM    Potassium 3.9 01/07/2017 05:24 AM    Chloride 108 01/07/2017 05:24 AM    CO2 25 01/07/2017 05:24 AM    Anion gap 9 01/07/2017 05:24 AM    Glucose 117 01/07/2017 05:24 AM    BUN 12 01/07/2017 05:24 AM    Creatinine 0.97 01/07/2017 05:24 AM    BUN/Creatinine ratio 12 01/07/2017 05:24 AM    GFR est AA >60 01/07/2017 05:24 AM    GFR est non-AA >60 01/07/2017 05:24 AM    Calcium 8.0 01/07/2017 05:24 AM       Lab Results   Component Value Date/Time    aPTT 25.3 01/06/2017 05:25 PM     Lab Results   Component Value Date/Time    INR 1.0 01/06/2017 05:25 PM    INR 1.0 04/04/2012 06:05 PM    Prothrombin time 10.1 01/06/2017 05:25 PM    Prothrombin time 10.1 04/04/2012 06:05 PM     No components found for: Almita Gandhi MD

## 2017-09-14 RX ORDER — CLOTRIMAZOLE AND BETAMETHASONE DIPROPIONATE 10; .64 MG/G; MG/G
CREAM TOPICAL
Qty: 15 G | Refills: 0 | OUTPATIENT
Start: 2017-09-14

## 2018-01-12 ENCOUNTER — OFFICE VISIT (OUTPATIENT)
Dept: FAMILY MEDICINE CLINIC | Age: 73
End: 2018-01-12

## 2018-01-12 ENCOUNTER — TELEPHONE (OUTPATIENT)
Dept: FAMILY MEDICINE CLINIC | Age: 73
End: 2018-01-12

## 2018-01-12 VITALS
HEART RATE: 65 BPM | SYSTOLIC BLOOD PRESSURE: 136 MMHG | RESPIRATION RATE: 20 BRPM | DIASTOLIC BLOOD PRESSURE: 78 MMHG | BODY MASS INDEX: 31.45 KG/M2 | TEMPERATURE: 97.9 F | OXYGEN SATURATION: 96 % | WEIGHT: 200.4 LBS | HEIGHT: 67 IN

## 2018-01-12 DIAGNOSIS — E11.9 CONTROLLED TYPE 2 DIABETES MELLITUS WITHOUT COMPLICATION, WITHOUT LONG-TERM CURRENT USE OF INSULIN (HCC): Primary | Chronic | ICD-10-CM

## 2018-01-12 DIAGNOSIS — K21.9 GASTROESOPHAGEAL REFLUX DISEASE WITHOUT ESOPHAGITIS: Chronic | ICD-10-CM

## 2018-01-12 DIAGNOSIS — Z23 ENCOUNTER FOR IMMUNIZATION: ICD-10-CM

## 2018-01-12 DIAGNOSIS — I48.91 ATRIAL FIBRILLATION WITH RVR (HCC): ICD-10-CM

## 2018-01-12 DIAGNOSIS — Z79.899 ENCOUNTER FOR LONG-TERM CURRENT USE OF MEDICATION: ICD-10-CM

## 2018-01-12 DIAGNOSIS — M47.896 OTHER OSTEOARTHRITIS OF SPINE, LUMBAR REGION: Chronic | ICD-10-CM

## 2018-01-12 DIAGNOSIS — E78.2 MIXED HYPERLIPIDEMIA: Chronic | ICD-10-CM

## 2018-01-12 DIAGNOSIS — E03.9 ACQUIRED HYPOTHYROIDISM: Chronic | ICD-10-CM

## 2018-01-12 LAB
ALBUMIN UR QL STRIP: 30 MG/L
BILIRUB UR QL STRIP: NEGATIVE
CREATININE, URINE POC: 300 MG/DL
GLUCOSE UR-MCNC: NEGATIVE MG/DL
KETONES P FAST UR STRIP-MCNC: NEGATIVE MG/DL
MICROALBUMIN/CREAT RATIO POC: <30 MG/G
PH UR STRIP: 7.5 [PH] (ref 4.6–8)
PROT UR QL STRIP: NEGATIVE
SP GR UR STRIP: 1.02 (ref 1–1.03)
UA UROBILINOGEN AMB POC: NORMAL (ref 0.2–1)
URINALYSIS CLARITY POC: NORMAL
URINALYSIS COLOR POC: NORMAL
URINE BLOOD POC: NEGATIVE
URINE LEUKOCYTES POC: NEGATIVE
URINE NITRITES POC: NEGATIVE

## 2018-01-12 RX ORDER — TRAMADOL HYDROCHLORIDE 50 MG/1
TABLET ORAL
Refills: 2 | COMMUNITY
Start: 2017-12-21 | End: 2018-01-12 | Stop reason: ALTCHOICE

## 2018-01-12 RX ORDER — LEVOTHYROXINE SODIUM 100 UG/1
TABLET ORAL
Qty: 90 TAB | Refills: 3 | Status: SHIPPED | OUTPATIENT
Start: 2018-01-12 | End: 2018-01-14 | Stop reason: DRUGHIGH

## 2018-01-12 RX ORDER — HYDROCODONE BITARTRATE AND ACETAMINOPHEN 10; 325 MG/1; MG/1
TABLET ORAL
Refills: 0 | COMMUNITY
Start: 2017-10-16 | End: 2018-01-12 | Stop reason: SDUPTHER

## 2018-01-12 RX ORDER — HYDROCODONE BITARTRATE AND ACETAMINOPHEN 10; 325 MG/1; MG/1
TABLET ORAL
Qty: 60 TAB | Refills: 0 | Status: SHIPPED | OUTPATIENT
Start: 2018-01-26 | End: 2018-02-26 | Stop reason: SDUPTHER

## 2018-01-12 RX ORDER — OMEPRAZOLE 20 MG/1
20 CAPSULE, DELAYED RELEASE ORAL DAILY
Qty: 90 CAP | Refills: 3 | Status: SHIPPED | OUTPATIENT
Start: 2018-01-12

## 2018-01-12 RX ORDER — METFORMIN HYDROCHLORIDE 1000 MG/1
1000 TABLET ORAL 2 TIMES DAILY WITH MEALS
Qty: 180 TAB | Refills: 3 | Status: SHIPPED | OUTPATIENT
Start: 2018-01-12

## 2018-01-12 RX ORDER — DILTIAZEM HYDROCHLORIDE 30 MG/1
30 TABLET, FILM COATED ORAL 2 TIMES DAILY
Qty: 180 TAB | Refills: 3 | Status: ON HOLD | OUTPATIENT
Start: 2018-01-12 | End: 2021-05-03

## 2018-01-12 RX ORDER — HYDROCODONE BITARTRATE AND ACETAMINOPHEN 7.5; 325 MG/1; MG/1
TABLET ORAL
Refills: 0 | COMMUNITY
Start: 2017-12-27 | End: 2018-01-12 | Stop reason: DRUGHIGH

## 2018-01-12 RX ORDER — DILTIAZEM HYDROCHLORIDE 30 MG/1
30 TABLET, FILM COATED ORAL 2 TIMES DAILY
COMMUNITY
End: 2018-01-12 | Stop reason: SDUPTHER

## 2018-01-12 NOTE — LETTER
1/15/2018 8:55 AM 
 
Mr. Emily [address]igu [address] [id number] Dear Emily Walsh: Please find your most recent results below. Resulted Orders AMB POC URINALYSIS DIP STICK AUTO W/O MICRO Result Value Ref Range Color (UA POC) CIT Group Clarity (UA POC) Slightly Cloudy Glucose (UA POC) Negative Negative Bilirubin (UA POC) Negative Negative Ketones (UA POC) Negative Negative Specific gravity (UA POC) 1.020 1.001 - 1.035 Blood (UA POC) Negative Negative pH (UA POC) 7.5 4.6 - 8.0 Protein (UA POC) Negative Negative Urobilinogen (UA POC) 0.2 mg/dL 0.2 - 1 Nitrites (UA POC) Negative Negative Leukocyte esterase (UA POC) Negative Negative AMB POC URINE, MICROALBUMIN, SEMIQUANT (3 RESULTS) Result Value Ref Range ALBUMIN, URINE POC 30 Negative mg/L  
 CREATININE, URINE  mg/dL Microalbumin/creat ratio (POC) <30 MG/G  
CBC W/O DIFF Result Value Ref Range WBC 6.5 3.4 - 10.8 x10E3/uL  
 RBC 6.23 (H) 4.14 - 5.80 x10E6/uL HGB 13.2 13.0 - 17.7 g/dL HCT 44.3 37.5 - 51.0 % MCV 71 (L) 79 - 97 fL  
 MCH 21.2 (L) 26.6 - 33.0 pg  
 MCHC 29.8 (L) 31.5 - 35.7 g/dL  
 RDW 17.6 (H) 12.3 - 15.4 % PLATELET 024 (L) 575 - 379 x10E3/uL Narrative Performed at:  [address]  [id number] : Mnioo Dc MD, Phone:  [phone] METABOLIC PANEL, COMPREHENSIVE Result Value Ref Range Glucose 93 65 - 99 mg/dL BUN 18 8 - 27 mg/dL Creatinine 0.91 0.76 - 1.27 mg/dL GFR est non-AA 83 >59 mL/min/1.73 GFR est AA 96 >59 mL/min/1.73  
 BUN/Creatinine ratio 20 10 - 24 Sodium 143 134 - 144 mmol/L Potassium 4.8 3.5 - 5.2 mmol/L Chloride 102 96 - 106 mmol/L  
 CO2 21 18 - 29 mmol/L Calcium 9.2 8.6 - 10.2 mg/dL Protein, total 6.9 6.0 - 8.5 g/dL Albumin 4.5 3.5 - 4.8 g/dL GLOBULIN, TOTAL 2.4 1.5 - 4.5 g/dL A-G Ratio 1.9 1.2 - 2.2 Bilirubin, total 0.3 0.0 - 1.2 mg/dL Alk. phosphatase 112 39 - 117 IU/L  
 AST (SGOT) 48 (H) 0 - 40 IU/L  
 ALT (SGPT) 50 (H) 0 - 44 IU/L Narrative Performed at:  [address]  [id number] : Jian James MD, Phone:  [phone] LIPID PANEL Result Value Ref Range Cholesterol, total 216 (H) 100 - 199 mg/dL Triglyceride 172 (H) 0 - 149 mg/dL HDL Cholesterol 50 >39 mg/dL VLDL, calculated 34 5 - 40 mg/dL LDL, calculated 132 (H) 0 - 99 mg/dL Narrative Performed at:  [address]  [id number] : Jian James MD, Phone:  [phone] T4, FREE Result Value Ref Range T4, Free 1.11 0.82 - 1.77 ng/dL Narrative Performed at:  [address]  [id number] : Jian James MD, Phone:  [phone] TSH 3RD GENERATION Result Value Ref Range TSH 6.730 (H) 0.450 - 4.500 uIU/mL Narrative Performed at:  [address]  [id number] : Jian James MD, Phone:  [phone] HEMOGLOBIN A1C WITH EAG Result Value Ref Range Hemoglobin A1c 6.8 (H) 4.8 - 5.6 % Comment:  
            Pre-diabetes: 5.7 - 6.4 Diabetes: >6.4 Glycemic control for adults with diabetes: <7.0 Estimated average glucose 148 mg/dL Narrative Performed at:  [address]  [id number] : Jian James MD, Phone:  [phone] CVD REPORT Result Value Ref Range INTERPRETATION Note Comment:  
   Supplemental report is available. Narrative Performed at:  [address] [place] [address]  [id number] : Joann Ford PhD, Phone:  [phone] DIABETES PATIENT EDUCATION Result Value Ref Range PDF Image Not applicable Narrative Performed at:  [address] [place] [address]  [id number] : Manuel Alberto PhD, Phone:  [phone] RECOMMENDATIONS: 
Labs show good diabetes control.  Good A1C 6.8%.  Cholesterol numbers are elevated.  Recommend try a different statin. I will send in order to pharmacy. Abnormal thyroid test shows too low. Need to increase dose of Levothyroxine. Plan to repeat fasting labs in 3 months. Please call me if you have any questions: [phone] Sincerely, Estefani Quarles MD

## 2018-01-12 NOTE — PROGRESS NOTES
Identified pt with two pt identifiers(name and ). Chief Complaint   Patient presents with    Diabetes        Health Maintenance Due   Topic    EYE EXAM RETINAL OR DILATED Q1     MEDICARE YEARLY EXAM     FOOT EXAM Q1     MICROALBUMIN Q1     HEMOGLOBIN A1C Q6M     FOBT Q 1 YEAR AGE 50-75     LIPID PANEL Q1     GLAUCOMA SCREENING Q2Y    [place] yesterday    Wt Readings from Last 3 Encounters:   18 200 lb 6.4 oz (90.9 kg)   17 205 lb (93 kg)   17 223 lb 15.8 oz (101.6 kg)     Temp Readings from Last 3 Encounters:   18 97.9 °F (36.6 °C) (Oral)   17 97.8 °F (36.6 °C)   12/15/16 98 °F (36.7 °C) (Oral)     BP Readings from Last 3 Encounters:   18 150/84   17 128/70   17 161/84     Pulse Readings from Last 3 Encounters:   18 65   17 60   17 62         Learning Assessment:  :     Learning Assessment 2016   PRIMARY LEARNER Patient Patient   HIGHEST LEVEL OF EDUCATION - PRIMARY LEARNER  2 YEARS OF COLLEGE -   BARRIERS PRIMARY LEARNER NONE -   CO-LEARNER CAREGIVER No -   PRIMARY LANGUAGE ENGLISH ENGLISH   LEARNER PREFERENCE PRIMARY LISTENING DEMONSTRATION   ANSWERED BY self Patient   RELATIONSHIP SELF SELF       Depression Screening:  :     PHQ over the last two weeks 12/15/2016   Little interest or pleasure in doing things Not at all   Feeling down, depressed or hopeless Not at all   Total Score PHQ 2 0       Fall Risk Assessment:  :     Fall Risk Assessment, last 12 mths 12/15/2016   Able to walk? Yes   Fall in past 12 months? No       Abuse Screening:  :     Abuse Screening Questionnaire 2016   Do you ever feel afraid of your partner? N   Are you in a relationship with someone who physically or mentally threatens you? N   Is it safe for you to go home? Y       Coordination of Care Questionnaire:  :     1) Have you been to an emergency room, urgent care clinic since your last visit?  Yes [facility] [address]  Hospitalized since your last visit? Yes [facility] 1/6-8/17           2) Have you seen or consulted any other health care providers outside of [address] [place] [address] since your last visit? yes Dr Schwartz Setting (Include any pap smears or colon screenings in this section.)    3) Do you have an Advance Directive on file? yes  Are you interested in receiving information about Advance Directives? no    Patient is accompanied by spouse I have received verbal consent from Rene Valadezllan  to discuss any/all medical information while they are present in the room. Reviewed record in preparation for visit and have obtained necessary documentation. Medication reconciliation up to date and corrected with patient at this time.      Results for orders placed or performed in visit on 01/12/18   AMB POC URINALYSIS DIP STICK AUTO W/O MICRO   Result Value Ref Range    Color (UA POC) Trina     Clarity (UA POC) Slightly Cloudy     Glucose (UA POC) Negative Negative    Bilirubin (UA POC) Negative Negative    Ketones (UA POC) Negative Negative    Specific gravity (UA POC) 1.020 1.001 - 1.035    Blood (UA POC) Negative Negative    pH (UA POC) 7.5 4.6 - 8.0    Protein (UA POC) Negative Negative    Urobilinogen (UA POC) 0.2 mg/dL 0.2 - 1    Nitrites (UA POC) Negative Negative    Leukocyte esterase (UA POC) Negative Negative   AMB POC URINE, MICROALBUMIN, SEMIQUANT (3 RESULTS)   Result Value Ref Range    ALBUMIN, URINE POC 30 Negative mg/L    CREATININE, URINE  mg/dL    Microalbumin/creat ratio (POC) <30 MG/G

## 2018-01-12 NOTE — LETTER
AGREEMENT for controlled medication treatment Edelmira Stevancarterkal, have agreed to a course of treatment that includes taking controlled medication. For this treatment I designate _____________________________________ as the [facility] Provider.  The purpose of this agreement is to prevent misunderstandings about controlled medications I may be taking for treatment, and to comply with applicable laws. I understand this agreement is essential to the trust and confidence necessary in a provider-patient relationship and that the designated provider will treat me in accordance with the statements below. As part of my treatment I agree to the followin.  USE 
a. I will take the controlled medication as instructed by the designated provider and avoid improper use of controlled medications. b.   I will not share, sell or trade controlled medication with anyone as this is a criminal offense.  
c.   I will not use any illegal controlled substance, including marijuana, cocaine, etc. as this is a criminal offense. 2.  PROVIDERS 
a. I will only obtain controlled medication from the designated provider. b.   I will not attempt to obtain the same or similar controlled medications, such as opioid pain medication, stimulants, anti-anxiety or hypnotics from any other provider as this is a criminal offense. 
c.   I will inform the designated provider about all other licensed professionals providing medical care to me and authorize communication between all providers to coordinate care, particularly prescribing or dispensing of controlled medications. 3.  PHARMACY 
a.   I will only fill controlled medication prescriptions at the approved pharmacy as listed below. 4.  OFFICE VISITS 
a. I agree to attend scheduled office visit appointments. b.   I am aware my office visits may be monthly or as determined necessary by the designated provider. c.   I will communicate fully with the designated provider about the character and intensity of my symptoms, the effect of the symptoms on my daily life, and how well the controlled medication is helping to relieve the cause of my symptoms. 5.  REFILLS OR CALL-IN PRESCRIPTIONS OF CONTROLLED MEDICATIONS 
a. I agree that refills of my prescriptions for controlled medications will be made at the time of an office visit during regular office hours. No refills will be available during evenings or on weekends. Abusive or inappropriate behavior related to medication refills will not be tolerated. b.   I will not call the office repeatedly to inquire about my controlled medication. I understand that medications will be written on the due date and not before. Calling the office repeatedly will be considered harassment, and I may be discharged from the practice. c.   Controlled medications may not be called in for refill, but doing so is at the discretion of the designated provider. d.   I am aware that only I must  prescriptions for controlled medications at the office but the designated provider may allow a designee to  the prescription from the office under very specific circumstance that may develop. 6.  TOXICOLOGY SCREENING 
a. I agree to random urine toxicology screenings in order to comply with government and [address] [place] [address] regulations. I understand that I will be financially responsible for any charges incurred by this office, Ricardo Villegas of [facility] laboratory, [employer], or [facility] for the urine toxicology screening, which may not be covered by my insurance. Failure to do so will be considered non-compliance and I may be discharged. b. In some cases an oral swab or hair sample may be substituted for a urine screen. This is at the discretion of the designated provider. I understand that I will be financially responsible for any charges incurred as well. c.   I understand that if the urine toxicology does not show my medications prescribed to me by the designated provider, or it shows any illegal substance or any other medications NOT prescribed by the designated providers, I may be discharged from this practice at the discretion of the designated provider and no further controlled medications will be prescribed or follow-up appointments scheduled. Also, if any illegal substances are detected on the urine toxicology, this information may be provided to local law enforcement. 7. PILL COUNTS 
a. I am aware I may be called at any time to come into the office for a count of all my remaining controlled medications in order to help the designated provider understand the rate at which I use my controlled medications and to more effectively adjust dosage. b. I agree that I will use my medication at a rate NO greater than the prescribed rate and that use of my medication at a greater rate will result in my being without medication for the period of time until next expected due date. c. I will bring in the containers with the medication prescribed by all providers, including the designated provider, to each office visit even if there is no medication remaining. All controlled medication will be in the original containers from the pharmacy for each medication. Failure to do so will be considered non-compliance and I may be discharged from the practice. 8.  LOSS OR THEFT OF MEDICATION 
a. I will safeguard my controlled medication from loss or theft. b.   Lost or stolen controlled medication may not be replaced. This includes a prescription that has not yet been filled at the pharmacy. c.   In the event my controlled medications are stolen or lost, I will notify the designated providers office immediately.   If such event occurs during the night, weekend or holiday, I will leave a detailed message on the answering machine or answering service at the number listed above. 
d.   I will file and produce an official police report for any effort to replace controlled medications prescribed. 9.  AGENCY COLLABORATION I authorize the designated provider and the authorized pharmacy/pharmacist to cooperate fully with any city, state, or federal law enforcement agency in the investigation of any possible misuse, sale or other diversion of my controlled medication. 10.  TREATMENT I understand that if I violate any of the conditions, my controlled medication and/or treatment will be terminated. If the violation involves obtaining controlled substances and/or dangerous drugs from another source, the incident may be reported to other medical facilities and authorities, including law enforcement. In this case, the designated provider may taper off the medication over a period of several days, as necessary, to avoid withdrawal symptoms or will suggest alternate treatment facilities. Also, a drug dependence treatment program may be recommended. 11.  AGREEMENT I agree to follow these guidelines that have been fully explained to me. All of my questions and concerns regarding treatment have been adequately answered. A copy of this document has been given to me. I agree to use ______________________________ Authorized Pharmacy located at _________________________________________________________________ Telephone ________________________________for filling ALL controlled medication prescriptions. This agreement is entered into on 1/12/2018 Patient signature__________________________________________________________ Legal Guardian signature___________________________________________________ Provider signature_________________________________________________________ Witness signature_________________________________________________________

## 2018-01-12 NOTE — MR AVS SNAPSHOT
Visit Information Date & Time Provider Department Dept. Phone Encounter #  
 6/86/0239 79:27 AM [facility] Hiren [phone] [id number] Upcoming Health Maintenance Date Due  
 EYE EXAM RETINAL OR DILATED Q1 1/11/2017 MEDICARE YEARLY EXAM 2/19/2017 FOBT Q 1 YEAR AGE 50-75 8/10/2017 GLAUCOMA SCREENING Q2Y 1/11/2018 HEMOGLOBIN A1C Q6M 7/12/2018 FOOT EXAM Q1 1/12/2019 MICROALBUMIN Q1 1/12/2019 LIPID PANEL Q1 1/12/2019 DTaP/Tdap/Td series (2 - Td) 1/1/2020 Allergies as of 1/12/2018  Review Complete On: 9/38/9451 By: Holland Hayes MD  
  
 Severity Noted Reaction Type Reactions Ambien [Zolpidem] High 04/07/2016   Side Effect Other (comments) Sleep walking Doing things like cooking but he was not awake Current Immunizations  Reviewed on 1/12/2018 Name Date Influenza High Dose Vaccine PF 10/17/2017, 9/21/2016 10:51 AM  
 Influenza Vaccine 11/1/2015, 10/2/2013 Pneumococcal Polysaccharide (PPSV-23) 1/1/2014 TB Skin Test (PPD) 1/1/2009 Tdap 1/1/2010 Zoster Vaccine, Live 3/15/2016 Reviewed by Holland Hayes MD on 1/12/2018 at 12:01 PM  
You Were Diagnosed With   
  
 Codes Comments Controlled type 2 diabetes mellitus without complication, without long-term current use of insulin ([facility].)    -  Primary ICD-10-CM: E11.9 ICD-9-CM: 250.00 Atrial fibrillation with RVR (HCC)     ICD-10-CM: I48.91 
ICD-9-CM: 427.31 Mixed hyperlipidemia     ICD-10-CM: E78.2 ICD-9-CM: 272.2 Acquired hypothyroidism     ICD-10-CM: E03.9 ICD-9-CM: 244.9 Gastroesophageal reflux disease without esophagitis     ICD-10-CM: K21.9 ICD-9-CM: 530.81 Encounter for long-term current use of medication     ICD-10-CM: Z79.899 ICD-9-CM: V58.69 Other osteoarthritis of spine, lumbar region     ICD-10-CM: M47.896 ICD-9-CM: 721.3 Encounter for immunization     ICD-10-CM: L67 ICD-9-CM: V03.89 Vitals BP Pulse Temp Resp Height(growth percentile) Weight(growth percentile) 136/78 65 97.9 °F (36.6 °C) (Oral) 20 5' 7\" (1.702 m) 200 lb 6.4 oz (90.9 kg) SpO2 BMI Smoking Status 96% 31.39 kg/m2 Never Smoker Vitals History BMI and BSA Data Body Mass Index Body Surface Area  
 31.39 kg/m 2 2.07 m 2 Preferred Pharmacy Pharmacy Name Phone [address] [facility] [phone] Your Updated Medication List  
  
   
This list is accurate as of: 1/12/18 12:15 PM.  Always use your most recent med list.  
  
  
  
  
 aspirin 81 mg chewable tablet Take 1 Tab by mouth daily. clobetasol 0.05 % external solution Commonly known as:  Marybel Ringgold Apply  to affected area two (2) times a day. Indications: DERMATOSIS OF THE SCALP  
  
 dilTIAZem 30 mg tablet Commonly known as:  CARDIZEM Take 1 Tab by mouth two (2) times a day. FREESTYLE LANCETS 28 gauge Misc Generic drug:  lancets 1 Package by IntraDERMal route once over twenty-four (24) hours. FREESTYLE LITE STRIPS strip Generic drug:  glucose blood VI test strips 100 Each by Does Not Apply route once over twenty-four (24) hours. Test blood sugar up to 3 times daily   Dx E11.9 HYDROcodone-acetaminophen  mg tablet Commonly known as:  NORCO  
TAKE 1 TABLET BY MOUTH TWICE A DAY AS NEEDED FOR PAIN Start taking on:  1/26/2018  
  
 levothyroxine 100 mcg tablet Commonly known as:  SYNTHROID  
TAKE 1 TABLET BY MOUTH EVERY DAY BEFORE BREAKFAST  
  
 metFORMIN 1,000 mg tablet Commonly known as:  GLUCOPHAGE Take 1 Tab by mouth two (2) times daily (with meals). Indications: type 2 diabetes mellitus  
  
 omeprazole 20 mg capsule Commonly known as:  PRILOSEC Take 1 Cap by mouth daily. pneumococcal 13 jessica conj dip 0.5 mL Syrg injection Commonly known as:  PREVNAR-13  
0.5 mL by IntraMUSCular route once for 1 dose. Prescriptions Printed Refills HYDROcodone-acetaminophen (NORCO)  mg tablet 0 Starting on: 2018 Sig: TAKE 1 TABLET BY MOUTH TWICE A DAY AS NEEDED FOR PAIN Class: Print  
 pneumococcal 13 jessica conj dip (PREVNAR-13) 0.5 mL syrg injection 0 Si.5 mL by IntraMUSCular route once for 1 dose. Class: Print Route: IntraMUSCular Prescriptions Sent to Pharmacy Refills  
 omeprazole (PRILOSEC) 20 mg capsule 3 Sig: Take 1 Cap by mouth daily. Class: Normal  
 Pharmacy: [address], [address] Ph #: [phone] Route: Oral  
 metFORMIN (GLUCOPHAGE) 1,000 mg tablet 3 Sig: Take 1 Tab by mouth two (2) times daily (with meals). Indications: type 2 diabetes mellitus Class: Normal  
 Pharmacy: [address], [address] Ph #: [phone] Route: Oral  
 levothyroxine (SYNTHROID) 100 mcg tablet 3 Sig: TAKE 1 TABLET BY MOUTH EVERY DAY BEFORE BREAKFAST Class: Normal  
 Pharmacy: [address], [address] Ph #: [phone]  
 dilTIAZem (CARDIZEM) 30 mg tablet 3 Sig: Take 1 Tab by mouth two (2) times a day. Class: Normal  
 Pharmacy: [address], [address] Ph #: [phone] Route: Oral  
  
We Performed the Following AMB POC URINALYSIS DIP STICK AUTO W/O MICRO [85819 CPT(R)] AMB POC URINE, MICROALBUMIN, SEMIQUANT (3 RESULTS) [86708 CPT(R)] CBC W/O DIFF [54814 CPT(R)] HEMOGLOBIN A1C WITH EAG [10210 CPT(R)]  DIABETES FOOT EXAM [7 Custom] LIPID PANEL [62876 CPT(R)] METABOLIC PANEL, COMPREHENSIVE [63816 CPT(R)] T4, FREE F8898848 CPT(R)] TSH 3RD GENERATION [45241 CPT(R)] Patient Instructions Atrial Fibrillation: Care Instructions Your Care Instructions Atrial fibrillation is an irregular and often fast heartbeat.  Treating this condition is important for several reasons. It can cause blood clots, which can travel from your heart to your brain and cause a stroke. If you have a fast heartbeat, you may feel lightheaded, dizzy, and weak. An irregular heartbeat can also increase your risk for heart failure. Atrial fibrillation is often the result of another heart condition, such as high blood pressure or coronary artery disease. Making changes to improve your heart condition will help you stay healthy and active. Follow-up care is a key part of your treatment and safety. Be sure to make and go to all appointments, and call your doctor if you are having problems. It's also a good idea to know your test results and keep a list of the medicines you take. How can you care for yourself at home? Medicines ? · Take your medicines exactly as prescribed. Call your doctor if you think you are having a problem with your medicine. You will get more details on the specific medicines your doctor prescribes. ? · If your doctor has given you a blood thinner to prevent a stroke, be sure you get instructions about how to take your medicine safely. Blood thinners can cause serious bleeding problems. ? · Do not take any vitamins, over-the-counter drugs, or herbal products without talking to your doctor first. ? Lifestyle changes ? · Do not smoke. Smoking can increase your chance of a stroke and heart attack. If you need help quitting, talk to your doctor about stop-smoking programs and medicines. These can increase your chances of quitting for good. ? · Eat a heart-healthy diet. ? · Stay at a healthy weight. Lose weight if you need to.  
? · Limit alcohol to 2 drinks a day for men and 1 drink a day for women. Too much alcohol can cause health problems. ? · Avoid colds and flu. Get a pneumococcal vaccine shot. If you have had one before, ask your doctor whether you need another dose.  Get a flu shot every year. If you must be around people with colds or flu, wash your hands often. Activity ? · If your doctor recommends it, get more exercise. Walking is a good choice. Bit by bit, increase the amount you walk every day. Try for at least 30 minutes on most days of the week. You also may want to swim, bike, or do other activities. Your doctor may suggest that you join a cardiac rehabilitation program so that you can have help increasing your physical activity safely. ? · Start light exercise if your doctor says it is okay. Even a small amount will help you get stronger, have more energy, and manage stress. Walking is an easy way to get exercise. Start out by walking a little more than you did in the hospital. Gradually increase the amount you walk. ? · When you exercise, watch for signs that your heart is working too hard. You are pushing too hard if you cannot talk while you are exercising. If you become short of breath or dizzy or have chest pain, sit down and rest immediately. ? · Check your pulse regularly. Place two fingers on the artery at the palm side of your wrist, in line with your thumb. If your heartbeat seems uneven or fast, talk to your doctor. When should you call for help? Call 911 anytime you think you may need emergency care. For example, call if: 
? · You have symptoms of a heart attack. These may include: ¨ Chest pain or pressure, or a strange feeling in the chest. 
¨ Sweating. ¨ Shortness of breath. ¨ Nausea or vomiting. ¨ Pain, pressure, or a strange feeling in the back, neck, jaw, or upper belly or in one or both shoulders or arms. ¨ Lightheadedness or sudden weakness. ¨ A fast or irregular heartbeat. After you call 911, the  may tell you to chew 1 adult-strength or 2 to 4 low-dose aspirin. Wait for an ambulance. Do not try to drive yourself. ? · You have symptoms of a stroke. These may include: ¨ Sudden numbness, tingling, weakness, or loss of movement in your face, arm, or leg, especially on only one side of your body. ¨ Sudden vision changes. ¨ Sudden trouble speaking. ¨ Sudden confusion or trouble understanding simple statements. ¨ Sudden problems with walking or balance. ¨ A sudden, severe headache that is different from past headaches. ? · You passed out (lost consciousness). ?Call your doctor now or seek immediate medical care if: 
? · You have new or increased shortness of breath. ? · You feel dizzy or lightheaded, or you feel like you may faint. ? · Your heart rate becomes irregular. ? · You can feel your heart flutter in your chest or skip heartbeats. Tell your doctor if these symptoms are new or worse. ? Watch closely for changes in your health, and be sure to contact your doctor if you have any problems. Where can you learn more? Go to http://catrina-dorie.info/. Enter U020 in the search box to learn more about \"Atrial Fibrillation: Care Instructions. \" Current as of: September 21, 2016 Content Version: 11.4 © 3607-2837 [employer] Care instructions adapted under license by [employer] (which disclaims liability or warranty for this information). If you have questions about a medical condition or this instruction, always ask your healthcare professional. [address] any warranty or liability for your use of this information. Introducing [facility]! Dear Alysia Padilla: Thank you for requesting a [employer] account. Our records indicate that you already have an active [employer] account. You can access your account anytime at [email or web address] [email or web address]. [employer] King[employer] Did you know that you can access your hospital and ER discharge instructions at any time in [employer]? You can also review all of your test results from your hospital stay or ER visit. Additional Information If you have questions, please visit the Frequently Asked Questions section of the [facility] website at [email or web address] Remember, [employer] is NOT to be used for urgent needs. For medical emergencies, dial 911. Now available from your iPhone and Android! Please provide this summary of care documentation to your next provider. Your primary care clinician is listed as Doree Bound. If you have any questions after today's visit, please call [phone].

## 2018-01-12 NOTE — TELEPHONE ENCOUNTER
Alejandra with [employer] called to get some information regarding patient's urine drug screening. She is wanting to know that since the government is requiring patient to get a random drug screening periodically since he is on pain medicine, if insurance will cover.     alejandra [phone]

## 2018-01-12 NOTE — PATIENT INSTRUCTIONS
Atrial Fibrillation: Care Instructions  Your Care Instructions    Atrial fibrillation is an irregular and often fast heartbeat. Treating this condition is important for several reasons. It can cause blood clots, which can travel from your heart to your brain and cause a stroke. If you have a fast heartbeat, you may feel lightheaded, dizzy, and weak. An irregular heartbeat can also increase your risk for heart failure. Atrial fibrillation is often the result of another heart condition, such as high blood pressure or coronary artery disease. Making changes to improve your heart condition will help you stay healthy and active. Follow-up care is a key part of your treatment and safety. Be sure to make and go to all appointments, and call your doctor if you are having problems. It's also a good idea to know your test results and keep a list of the medicines you take. How can you care for yourself at home? Medicines  ? · Take your medicines exactly as prescribed. Call your doctor if you think you are having a problem with your medicine. You will get more details on the specific medicines your doctor prescribes. ? · If your doctor has given you a blood thinner to prevent a stroke, be sure you get instructions about how to take your medicine safely. Blood thinners can cause serious bleeding problems. ? · Do not take any vitamins, over-the-counter drugs, or herbal products without talking to your doctor first.   ? Lifestyle changes  ? · Do not smoke. Smoking can increase your chance of a stroke and heart attack. If you need help quitting, talk to your doctor about stop-smoking programs and medicines. These can increase your chances of quitting for good. ? · Eat a heart-healthy diet. ? · Stay at a healthy weight. Lose weight if you need to.   ? · Limit alcohol to 2 drinks a day for men and 1 drink a day for women. Too much alcohol can cause health problems. ? · Avoid colds and flu.  Get a pneumococcal vaccine shot. If you have had one before, ask your doctor whether you need another dose. Get a flu shot every year. If you must be around people with colds or flu, wash your hands often. Activity  ? · If your doctor recommends it, get more exercise. Walking is a good choice. Bit by bit, increase the amount you walk every day. Try for at least 30 minutes on most days of the week. You also may want to swim, bike, or do other activities. Your doctor may suggest that you join a cardiac rehabilitation program so that you can have help increasing your physical activity safely. ? · Start light exercise if your doctor says it is okay. Even a small amount will help you get stronger, have more energy, and manage stress. Walking is an easy way to get exercise. Start out by walking a little more than you did in the hospital. Gradually increase the amount you walk. ? · When you exercise, watch for signs that your heart is working too hard. You are pushing too hard if you cannot talk while you are exercising. If you become short of breath or dizzy or have chest pain, sit down and rest immediately. ? · Check your pulse regularly. Place two fingers on the artery at the palm side of your wrist, in line with your thumb. If your heartbeat seems uneven or fast, talk to your doctor. When should you call for help? Call 911 anytime you think you may need emergency care. For example, call if:  ? · You have symptoms of a heart attack. These may include:  ¨ Chest pain or pressure, or a strange feeling in the chest.  ¨ Sweating. ¨ Shortness of breath. ¨ Nausea or vomiting. ¨ Pain, pressure, or a strange feeling in the back, neck, jaw, or upper belly or in one or both shoulders or arms. ¨ Lightheadedness or sudden weakness. ¨ A fast or irregular heartbeat. After you call 911, the  may tell you to chew 1 adult-strength or 2 to 4 low-dose aspirin. Wait for an ambulance. Do not try to drive yourself.    ? · You have symptoms of a stroke. These may include:  ¨ Sudden numbness, tingling, weakness, or loss of movement in your face, arm, or leg, especially on only one side of your body. ¨ Sudden vision changes. ¨ Sudden trouble speaking. ¨ Sudden confusion or trouble understanding simple statements. ¨ Sudden problems with walking or balance. ¨ A sudden, severe headache that is different from past headaches. ? · You passed out (lost consciousness). ?Call your doctor now or seek immediate medical care if:  ? · You have new or increased shortness of breath. ? · You feel dizzy or lightheaded, or you feel like you may faint. ? · Your heart rate becomes irregular. ? · You can feel your heart flutter in your chest or skip heartbeats. Tell your doctor if these symptoms are new or worse. ? Watch closely for changes in your health, and be sure to contact your doctor if you have any problems. Where can you learn more? Go to http://catrina-dorie.info/. Enter U020 in the search box to learn more about \"Atrial Fibrillation: Care Instructions. \"  Current as of: September 21, 2016  Content Version: 11.4  © 8255-5436 [employer]. Care instructions adapted under license by [employer] (which disclaims liability or warranty for this information). If you have questions about a medical condition or this instruction, always ask your healthcare professional. [address] any warranty or liability for your use of this information.

## 2018-01-13 LAB
ALBUMIN SERPL-MCNC: 4.5 G/DL (ref 3.5–4.8)
ALBUMIN/GLOB SERPL: 1.9 {RATIO} (ref 1.2–2.2)
ALP SERPL-CCNC: 112 IU/L (ref 39–117)
ALT SERPL-CCNC: 50 IU/L (ref 0–44)
AST SERPL-CCNC: 48 IU/L (ref 0–40)
BILIRUB SERPL-MCNC: 0.3 MG/DL (ref 0–1.2)
BUN SERPL-MCNC: 18 MG/DL (ref 8–27)
BUN/CREAT SERPL: 20 (ref 10–24)
CALCIUM SERPL-MCNC: 9.2 MG/DL (ref 8.6–10.2)
CHLORIDE SERPL-SCNC: 102 MMOL/L (ref 96–106)
CHOLEST SERPL-MCNC: 216 MG/DL (ref 100–199)
CO2 SERPL-SCNC: 21 MMOL/L (ref 18–29)
CREAT SERPL-MCNC: 0.91 MG/DL (ref 0.76–1.27)
ERYTHROCYTE [DISTWIDTH] IN BLOOD BY AUTOMATED COUNT: 17.6 % (ref 12.3–15.4)
EST. AVERAGE GLUCOSE BLD GHB EST-MCNC: 148 MG/DL
GLOBULIN SER CALC-MCNC: 2.4 G/DL (ref 1.5–4.5)
GLUCOSE SERPL-MCNC: 93 MG/DL (ref 65–99)
HBA1C MFR BLD: 6.8 % (ref 4.8–5.6)
HCT VFR BLD AUTO: 44.3 % (ref 37.5–51)
HDLC SERPL-MCNC: 50 MG/DL
HGB BLD-MCNC: 13.2 G/DL (ref 13–17.7)
INTERPRETATION, 910389: NORMAL
LDLC SERPL CALC-MCNC: 132 MG/DL (ref 0–99)
Lab: NORMAL
MCH RBC QN AUTO: 21.2 PG (ref 26.6–33)
MCHC RBC AUTO-ENTMCNC: 29.8 G/DL (ref 31.5–35.7)
MCV RBC AUTO: 71 FL (ref 79–97)
PLATELET # BLD AUTO: 146 X10E3/UL (ref 150–379)
POTASSIUM SERPL-SCNC: 4.8 MMOL/L (ref 3.5–5.2)
PROT SERPL-MCNC: 6.9 G/DL (ref 6–8.5)
RBC # BLD AUTO: 6.23 X10E6/UL (ref 4.14–5.8)
SODIUM SERPL-SCNC: 143 MMOL/L (ref 134–144)
T4 FREE SERPL-MCNC: 1.11 NG/DL (ref 0.82–1.77)
TRIGL SERPL-MCNC: 172 MG/DL (ref 0–149)
TSH SERPL DL<=0.005 MIU/L-ACNC: 6.73 UIU/ML (ref 0.45–4.5)
VLDLC SERPL CALC-MCNC: 34 MG/DL (ref 5–40)
WBC # BLD AUTO: 6.5 X10E3/UL (ref 3.4–10.8)

## 2018-01-13 NOTE — TELEPHONE ENCOUNTER
I did not order urine drug screen yesterday. Waiting for records from Dr. Vito Hathaway. I spoke with [employer] rep and pt ws the one who called them enquiring if UDS is covered test. Rep will find out and call back. Also I asked about med alternative to Eliquis. Xarelto is also tier 4. Rep will find out if other lower tier alternative from pharmacy dept.

## 2018-01-13 NOTE — PROGRESS NOTES
HISTORY OF PRESENT ILLNESS  Laquita Alvarado is a 67 y.o. male. HPI  Pt presents to re-establish care. insurance switched to [facility] and Dr. Jennifer Mckoy did not participate. He needs med refills. Patient Active Problem List   Diagnosis Code    Mixed hyperlipidemia E78.2    Obese E66.9    Chronic back pain M54.9, G89.29    Acquired hypothyroidism E03.9    GERD (gastroesophageal reflux disease) K21.9    Diabetes mellitus type 2, controlled ([address] E11.9    Polyposis coli D12.6    Spondylosis of lumbar joint M47.816    Skin cancer of nose C44.301    Actinic keratosis L57.0    Atopic dermatitis L20.9    Atrial fibrillation with RVR ([facility]) I48.91     He wants to switch off Eliquis due to high tier cost. Hx atrial fib. He also needs pain med contract signed due to chronic low back pain.  profile reviewed. Review of Systems   Musculoskeletal: Positive for back pain. All other systems reviewed and are negative. Visit Vitals    /78    Pulse 65    Temp 97.9 °F (36.6 °C) (Oral)    Resp 20    Ht 5' 7\" (1.702 m)    Wt 200 lb 6.4 oz (90.9 kg)    SpO2 96%    BMI 31.39 kg/m2       Physical Exam   Constitutional: He is oriented to person, place, and time. He appears well-developed and well-nourished. Eyes: Conjunctivae are normal.   Neck: Neck supple. Cardiovascular: Normal rate. An irregularly irregular rhythm present. Pulmonary/Chest: Effort normal and breath sounds normal.   Neurological: He is alert and oriented to person, place, and time. Vitals reviewed. Diabetic foot exam:     Left:    Filament test normal sensation with micro filament   Pulse DP: 2+ (normal)   Pulse PT: 2+ (normal)   Deformities: None  Right:    Filament test normal sensation with micro filament   Pulse DP: 2+ (normal)   Pulse PT: 2+ (normal)   Deformities: None    ASSESSMENT and PLAN    ICD-10-CM ICD-9-CM    1.  Controlled type 2 diabetes mellitus without complication, without long-term current use of insulin ([facility] E11.9 250.00 AMB POC URINE, MICROALBUMIN, SEMIQUANT (3 RESULTS)       DIABETES FOOT EXAM      metFORMIN (GLUCOPHAGE) 1,000 mg tablet      LIPID PANEL      HEMOGLOBIN A1C WITH EAG   2. Atrial fibrillation with RVR ([facility]) I48.91 427.31 dilTIAZem (CARDIZEM) 30 mg tablet   3. Mixed hyperlipidemia E78.2 272.2    4. Acquired hypothyroidism E03.9 244.9 T4, FREE      TSH 3RD GENERATION      levothyroxine (SYNTHROID) 100 mcg tablet   5. Gastroesophageal reflux disease without esophagitis K21.9 530.81 omeprazole (PRILOSEC) 20 mg capsule   6. Encounter for long-term current use of medication Z79.899 V58.69 AMB POC URINALYSIS DIP STICK AUTO W/O MICRO      CBC W/O DIFF      METABOLIC PANEL, COMPREHENSIVE   7. Other osteoarthritis of spine, lumbar region M47.896 721.3 HYDROcodone-acetaminophen (NORCO)  mg tablet   8. Encounter for immunization Z23 V03.89 pneumococcal 13 jessica conj dip (PREVNAR-13) 0.5 mL syrg injection     Labs drawn. Order Prevnar 13. Med refills provided. Need to see if there is lower tier alternative for Eliquis. Request medical records from Dr. Elisa Phelan. Patient Instructions          Atrial Fibrillation: Care Instructions  Your Care Instructions    Atrial fibrillation is an irregular and often fast heartbeat. Treating this condition is important for several reasons. It can cause blood clots, which can travel from your heart to your brain and cause a stroke. If you have a fast heartbeat, you may feel lightheaded, dizzy, and weak. An irregular heartbeat can also increase your risk for heart failure. Atrial fibrillation is often the result of another heart condition, such as high blood pressure or coronary artery disease. Making changes to improve your heart condition will help you stay healthy and active. Follow-up care is a key part of your treatment and safety. Be sure to make and go to all appointments, and call your doctor if you are having problems.  It's also a good idea to know your test results and keep a list of the medicines you take. How can you care for yourself at home? Medicines  ? · Take your medicines exactly as prescribed. Call your doctor if you think you are having a problem with your medicine. You will get more details on the specific medicines your doctor prescribes. ? · If your doctor has given you a blood thinner to prevent a stroke, be sure you get instructions about how to take your medicine safely. Blood thinners can cause serious bleeding problems. ? · Do not take any vitamins, over-the-counter drugs, or herbal products without talking to your doctor first.   ? Lifestyle changes  ? · Do not smoke. Smoking can increase your chance of a stroke and heart attack. If you need help quitting, talk to your doctor about stop-smoking programs and medicines. These can increase your chances of quitting for good. ? · Eat a heart-healthy diet. ? · Stay at a healthy weight. Lose weight if you need to.   ? · Limit alcohol to 2 drinks a day for men and 1 drink a day for women. Too much alcohol can cause health problems. ? · Avoid colds and flu. Get a pneumococcal vaccine shot. If you have had one before, ask your doctor whether you need another dose. Get a flu shot every year. If you must be around people with colds or flu, wash your hands often. Activity  ? · If your doctor recommends it, get more exercise. Walking is a good choice. Bit by bit, increase the amount you walk every day. Try for at least 30 minutes on most days of the week. You also may want to swim, bike, or do other activities. Your doctor may suggest that you join a cardiac rehabilitation program so that you can have help increasing your physical activity safely. ? · Start light exercise if your doctor says it is okay. Even a small amount will help you get stronger, have more energy, and manage stress. Walking is an easy way to get exercise.  Start out by walking a little more than you did in the hospital. Gradually increase the amount you walk. ? · When you exercise, watch for signs that your heart is working too hard. You are pushing too hard if you cannot talk while you are exercising. If you become short of breath or dizzy or have chest pain, sit down and rest immediately. ? · Check your pulse regularly. Place two fingers on the artery at the palm side of your wrist, in line with your thumb. If your heartbeat seems uneven or fast, talk to your doctor. When should you call for help? Call 911 anytime you think you may need emergency care. For example, call if:  ? · You have symptoms of a heart attack. These may include:  ¨ Chest pain or pressure, or a strange feeling in the chest.  ¨ Sweating. ¨ Shortness of breath. ¨ Nausea or vomiting. ¨ Pain, pressure, or a strange feeling in the back, neck, jaw, or upper belly or in one or both shoulders or arms. ¨ Lightheadedness or sudden weakness. ¨ A fast or irregular heartbeat. After you call 911, the  may tell you to chew 1 adult-strength or 2 to 4 low-dose aspirin. Wait for an ambulance. Do not try to drive yourself. ? · You have symptoms of a stroke. These may include:  ¨ Sudden numbness, tingling, weakness, or loss of movement in your face, arm, or leg, especially on only one side of your body. ¨ Sudden vision changes. ¨ Sudden trouble speaking. ¨ Sudden confusion or trouble understanding simple statements. ¨ Sudden problems with walking or balance. ¨ A sudden, severe headache that is different from past headaches. ? · You passed out (lost consciousness). ?Call your doctor now or seek immediate medical care if:  ? · You have new or increased shortness of breath. ? · You feel dizzy or lightheaded, or you feel like you may faint. ? · Your heart rate becomes irregular. ? · You can feel your heart flutter in your chest or skip heartbeats. Tell your doctor if these symptoms are new or worse. ? Watch closely for changes in your health, and be sure to contact your doctor if you have any problems. Where can you learn more? Go to http://catrina-dorie.info/. Enter U020 in the search box to learn more about \"Atrial Fibrillation: Care Instructions. \"  Current as of: September 21, 2016  Content Version: 11.4  © 7021-6853 [employer]. Care instructions adapted under license by [employer] (which disclaims liability or warranty for this information). If you have questions about a medical condition or this instruction, always ask your healthcare professional. [address] any warranty or liability for your use of this information. Pain Contract signed.

## 2018-01-14 ENCOUNTER — TELEPHONE (OUTPATIENT)
Dept: FAMILY MEDICINE CLINIC | Age: 73
End: 2018-01-14

## 2018-01-14 DIAGNOSIS — E78.00 HYPERCHOLESTEROLEMIA: ICD-10-CM

## 2018-01-14 DIAGNOSIS — E03.9 ACQUIRED HYPOTHYROIDISM: Primary | ICD-10-CM

## 2018-01-14 RX ORDER — ATORVASTATIN CALCIUM 10 MG/1
10 TABLET, FILM COATED ORAL DAILY
Qty: 90 TAB | Refills: 3 | Status: SHIPPED | OUTPATIENT
Start: 2018-01-14

## 2018-01-14 RX ORDER — LEVOTHYROXINE SODIUM 112 UG/1
112 TABLET ORAL
Qty: 90 TAB | Refills: 1 | Status: SHIPPED | OUTPATIENT
Start: 2018-01-14 | End: 2018-01-15 | Stop reason: DRUGHIGH

## 2018-01-14 NOTE — TELEPHONE ENCOUNTER
I sent new Order for statin and increased dose Levothyroxine. Repeat fasting labs in 3 months, include thyroid test.  I spoke with [employer] rep over weekend. They are looking into alternate blood thinner to Eliquis. It seems the 2 other options (Xarelto and Pradaxa) are also 4 th tier same as Eliquis. So coumadin may be his only low cost option. Does he want to switch to coumadin?

## 2018-01-14 NOTE — PROGRESS NOTES
Labs show good diabetes control. Good A1C 6.8%. Cholesterol numbers are elevated. Recommend try a different statin. I will send in order to pharmacy. Abnormal thyroid test shows too low. Need to increase dose of Levothyroxine. Plan to repeat fasting labs in 3 months.

## 2018-01-15 ENCOUNTER — TELEPHONE (OUTPATIENT)
Dept: FAMILY MEDICINE CLINIC | Age: 73
End: 2018-01-15

## 2018-01-15 DIAGNOSIS — E03.9 ACQUIRED HYPOTHYROIDISM: Primary | ICD-10-CM

## 2018-01-15 RX ORDER — LEVOTHYROXINE SODIUM 100 UG/1
100 TABLET ORAL
Qty: 90 TAB | Refills: 3 | Status: SHIPPED | OUTPATIENT
Start: 2018-01-15

## 2018-01-15 NOTE — TELEPHONE ENCOUNTER
Patient called and stated that he would like for Vishnu Koehler to call him. He would not elaborate any further as to what he was calling for.   Please call him back at

## 2018-01-15 NOTE — TELEPHONE ENCOUNTER
Spoke with pt. He has not taken Levothyroxine since Nov.  Will plan to stay on Levothyroxine 100 mcg tab daily. FU labs in 3 months. He is considering paying for Eliquis . Will call back with decision.

## 2018-01-16 ENCOUNTER — PATIENT MESSAGE (OUTPATIENT)
Dept: FAMILY MEDICINE CLINIC | Age: 73
End: 2018-01-16

## 2018-01-16 DIAGNOSIS — I48.19 PERSISTENT ATRIAL FIBRILLATION (HCC): Primary | ICD-10-CM

## 2018-01-16 NOTE — TELEPHONE ENCOUNTER
From: Rahul Sorenson  To: Estefani Quarles MD  Sent: 1/16/2018 9:41 AM EST  Subject: Update Medical Information    Please send the script for Eliquis to [facility]. If I get 90 days I only pay for 60. I got the P13 shot Yesterday 1/15/18. I have an appt. on 4/16/18 @ 9:00AM. I will fast for blood work. Thank-you and have a good day.

## 2018-01-27 ENCOUNTER — PATIENT MESSAGE (OUTPATIENT)
Dept: FAMILY MEDICINE CLINIC | Age: 73
End: 2018-01-27

## 2018-01-29 RX ORDER — CLOBETASOL PROPIONATE 0.46 MG/ML
SOLUTION TOPICAL
Qty: 150 ML | Refills: 3 | Status: SHIPPED | COMMUNITY
Start: 2018-01-29 | End: 2018-02-26 | Stop reason: DRUGHIGH

## 2018-01-29 RX ORDER — CLOBETASOL PROPIONATE 0.46 MG/ML
SOLUTION TOPICAL 2 TIMES DAILY
Status: CANCELLED
Start: 2018-01-29

## 2018-01-29 NOTE — TELEPHONE ENCOUNTER
From: [facility]  To: Tee Dailey MD  Sent: 1/27/2018 11:30 PM EST  Subject: Prescription Question    I would like to renew my script for Clobetasol Propionate Cream 60g USP,0.05%       Thank-you.

## 2018-02-02 ENCOUNTER — OFFICE VISIT (OUTPATIENT)
Dept: FAMILY MEDICINE CLINIC | Age: 73
End: 2018-02-02

## 2018-02-02 ENCOUNTER — TELEPHONE (OUTPATIENT)
Dept: FAMILY MEDICINE CLINIC | Age: 73
End: 2018-02-02

## 2018-02-02 VITALS
WEIGHT: 203.4 LBS | SYSTOLIC BLOOD PRESSURE: 134 MMHG | HEIGHT: 67 IN | BODY MASS INDEX: 31.92 KG/M2 | DIASTOLIC BLOOD PRESSURE: 62 MMHG | RESPIRATION RATE: 20 BRPM | HEART RATE: 71 BPM | TEMPERATURE: 97.8 F | OXYGEN SATURATION: 96 %

## 2018-02-02 DIAGNOSIS — G89.29 CHRONIC MIDLINE LOW BACK PAIN, WITH SCIATICA PRESENCE UNSPECIFIED: Chronic | ICD-10-CM

## 2018-02-02 DIAGNOSIS — M25.511 RIGHT SHOULDER PAIN, UNSPECIFIED CHRONICITY: ICD-10-CM

## 2018-02-02 DIAGNOSIS — L30.9 ECZEMA, UNSPECIFIED TYPE: ICD-10-CM

## 2018-02-02 DIAGNOSIS — K13.0 ANGULAR CHEILITIS: ICD-10-CM

## 2018-02-02 DIAGNOSIS — M54.5 CHRONIC MIDLINE LOW BACK PAIN, WITH SCIATICA PRESENCE UNSPECIFIED: Chronic | ICD-10-CM

## 2018-02-02 DIAGNOSIS — Z79.891 OPIOID USE AGREEMENT EXISTS: ICD-10-CM

## 2018-02-02 DIAGNOSIS — M47.896 OTHER OSTEOARTHRITIS OF SPINE, LUMBAR REGION: Chronic | ICD-10-CM

## 2018-02-02 DIAGNOSIS — E11.9 CONTROLLED TYPE 2 DIABETES MELLITUS WITHOUT COMPLICATION, WITHOUT LONG-TERM CURRENT USE OF INSULIN (HCC): Primary | Chronic | ICD-10-CM

## 2018-02-02 RX ORDER — PNEUMOCOCCAL 13-VALENT CONJUGATE VACCINE 2.2; 2.2; 2.2; 2.2; 2.2; 4.4; 2.2; 2.2; 2.2; 2.2; 2.2; 2.2; 2.2 UG/.5ML; UG/.5ML; UG/.5ML; UG/.5ML; UG/.5ML; UG/.5ML; UG/.5ML; UG/.5ML; UG/.5ML; UG/.5ML; UG/.5ML; UG/.5ML; UG/.5ML
INJECTION, SUSPENSION INTRAMUSCULAR
Refills: 0 | COMMUNITY
Start: 2018-01-15 | End: 2018-02-02 | Stop reason: ALTCHOICE

## 2018-02-02 RX ORDER — CLOBETASOL PROPIONATE 0.5 MG/G
CREAM TOPICAL 2 TIMES DAILY
COMMUNITY
End: 2018-02-02 | Stop reason: SDUPTHER

## 2018-02-02 RX ORDER — CHLORPHENIRAMINE MALEATE 4 MG
TABLET ORAL 2 TIMES DAILY
Qty: 15 G | Refills: 0 | Status: ON HOLD | OUTPATIENT
Start: 2018-02-02 | End: 2021-05-03

## 2018-02-02 RX ORDER — TRIAMCINOLONE ACETONIDE 1 MG/G
CREAM TOPICAL
COMMUNITY
Start: 2018-01-30 | End: 2018-02-02 | Stop reason: ALTCHOICE

## 2018-02-02 RX ORDER — CLOBETASOL PROPIONATE 0.5 MG/G
CREAM TOPICAL 2 TIMES DAILY
Qty: 60 TUBE | Refills: 3 | Status: ON HOLD | OUTPATIENT
Start: 2018-02-02 | End: 2021-05-03

## 2018-02-02 NOTE — TELEPHONE ENCOUNTER
Patient stopped at  and wanted to let us know that [facility] doesn't accept [facility]. Patient is going to check with his insurance and call around and will let us know when he finds a place.

## 2018-02-02 NOTE — PROGRESS NOTES
Identified pt with two pt identifiers(name and ). Chief Complaint   Patient presents with    Shoulder Pain     he had [facility] vaccine on 18 at [facility] in right shoulder. It has been hurting since        Health Maintenance Due   Topic    EYE EXAM RETINAL OR DILATED Q1     MEDICARE YEARLY EXAM     FOBT Q 1 YEAR AGE 50-75     GLAUCOMA SCREENING Q2Y    VEI     Wt Readings from Last 3 Encounters:   18 203 lb 6.4 oz (92.3 kg)   18 200 lb 6.4 oz (90.9 kg)   17 205 lb (93 kg)     Temp Readings from Last 3 Encounters:   18 97.8 °F (36.6 °C) (Oral)   18 97.9 °F (36.6 °C) (Oral)   17 97.8 °F (36.6 °C)     BP Readings from Last 3 Encounters:   18 134/62   18 136/78   17 128/70     Pulse Readings from Last 3 Encounters:   18 71   18 65   17 60         Learning Assessment:  :     Learning Assessment 2016   PRIMARY LEARNER Patient Patient   HIGHEST LEVEL OF EDUCATION - PRIMARY LEARNER  2 YEARS OF COLLEGE -   BARRIERS PRIMARY LEARNER NONE -   CO-LEARNER CAREGIVER No -   PRIMARY LANGUAGE ENGLISH ENGLISH   LEARNER PREFERENCE PRIMARY LISTENING DEMONSTRATION   ANSWERED BY self Patient   RELATIONSHIP SELF SELF       Depression Screening:  :     PHQ over the last two weeks 12/15/2016   Little interest or pleasure in doing things Not at all   Feeling down, depressed or hopeless Not at all   Total Score PHQ 2 0       Fall Risk Assessment:  :     Fall Risk Assessment, last 12 mths 12/15/2016   Able to walk? Yes   Fall in past 12 months? No       Abuse Screening:  :     Abuse Screening Questionnaire 2016   Do you ever feel afraid of your partner? N   Are you in a relationship with someone who physically or mentally threatens you? N   Is it safe for you to go home?  Y       Coordination of Care Questionnaire:  :     1) Have you been to an emergency room, urgent care clinic since your last visit? no   Hospitalized since your last visit? no 2) Have you seen or consulted any other health care providers outside of [address] [place] [address] since your last visit? no  (Include any pap smears or colon screenings in this section.)    3) Do you have an Advance Directive on file? yes  Are you interested in receiving information about Advance Directives? no    Reviewed record in preparation for visit and have obtained necessary documentation. Medication reconciliation up to date and corrected with patient at this time.

## 2018-02-02 NOTE — MR AVS SNAPSHOT
[address] [facility] 
[phone] Patient: Fernandez Contreras MRN: [id number] Visit Information Date & Time Provider Department Dept. Phone Encounter #  
 [id number] 38:60 AM Avery Wagner [phone] Your Appointments 2018  9:00 AM  
ROUTINE CARE with Ovi Barnes MD  
[address] [place] [address]) Appt Note: 3 month fasting follow up [phone] [facility]  
  
   
 [address] Upcoming Health Maintenance Date Due  
 EYE EXAM RETINAL OR DILATED Q1 2017 MEDICARE YEARLY EXAM 2017 FOBT Q 1 YEAR AGE 50-75 8/10/2017 GLAUCOMA SCREENING Q2Y 2018 HEMOGLOBIN A1C Q6M 2018 FOOT EXAM Q1 2019 MICROALBUMIN Q1 2019 LIPID PANEL Q1 2019 DTaP/Tdap/Td series (2 - Td) 2020 Allergies as of 2018  Review Complete On:  By: Ovi Barnes MD  
  
 Severity Noted Reaction Type Reactions Ambien [Zolpidem] High 2016   Side Effect Other (comments) Sleep walking Doing things like cooking but he was not awake Current Immunizations  Reviewed on 2018 Name Date Influenza High Dose Vaccine PF 10/17/2017, 2016 10:51 AM  
 Influenza Vaccine 2015, 10/2/2013 Pneumococcal Conjugate (PCV-13) 1/15/2018 Pneumococcal Polysaccharide (PPSV-23) 2014 TB Skin Test (PPD) 2009 Tdap 2010 Zoster Vaccine, Live 3/15/2016 Not reviewed this visit You Were Diagnosed With   
  
 Codes Comments Controlled type 2 diabetes mellitus without complication, without long-term current use of insulin ([facility]    -  Primary ICD-10-CM: E11.9 ICD-9-CM: 250.00 Eczema, unspecified type     ICD-10-CM: L30.9 ICD-9-CM: 692.9 Chronic midline low back pain, with sciatica presence unspecified     ICD-10-CM: M54.5, G89.29 ICD-9-CM: 724.2, 338.29 Opioid use agreement exists     ICD-10-CM: Z02.89 ICD-9-CM: V68.89 Other osteoarthritis of spine, lumbar region     ICD-10-CM: M47.896 ICD-9-CM: 721.3 Right shoulder pain, unspecified chronicity     ICD-10-CM: M25.511 ICD-9-CM: 719.41 Angular cheilitis     ICD-10-CM: K13.0 ICD-9-CM: 528.5 Vitals BP Pulse Temp Resp Height(growth percentile) Weight(growth percentile) 134/62 (BP 1 Location: Right arm, BP Patient Position: Sitting) 71 97.8 °F (36.6 °C) (Oral) 20 5' 7\" (1.702 m) 203 lb 6.4 oz (92.3 kg) SpO2 BMI Smoking Status 96% 31.86 kg/m2 Never Smoker BMI and BSA Data Body Mass Index Body Surface Area  
 31.86 kg/m 2 2.09 m 2 Preferred Pharmacy Pharmacy Name Phone [id number] [address] [facility], [address] [place] [address] Dr [phone] Your Updated Medication List  
  
   
This list is accurate as of: 2/2/18 11:52 AM.  Always use your most recent med list.  
  
  
  
  
 apixaban 5 mg tablet Commonly known as:  [place] Arvin Take 1 Tab by mouth two (2) times a day. Indications: PREVENT THROMBOEMBOLISM IN CHRONIC ATRIAL FIBRILLATION  
  
 aspirin 81 mg chewable tablet Take 1 Tab by mouth daily. atorvastatin 10 mg tablet Commonly known as:  LIPITOR Take 1 Tab by mouth daily. Indications: hypercholesterolemia * clobetasol 0.05 % external solution Commonly known as:  Valdo Langton Apply twice a day. Indications: Dermatosis of the Scalp * clobetasol 0.05 % topical cream  
Commonly known as:  Valdo Langton Apply  to affected area two (2) times a day. clotrimazole 1 % topical cream  
Commonly known as:  Ulanda Ganong Apply  to affected area two (2) times a day. dilTIAZem 30 mg tablet Commonly known as:  CARDIZEM Take 1 Tab by mouth two (2) times a day. FREESTYLE LANCETS 28 gauge Misc Generic drug:  lancets 1 Package by IntraDERMal route once over twenty-four (24) hours. FREESTYLE LITE STRIPS strip Generic drug:  glucose blood VI test strips 100 Each by Does Not Apply route once over twenty-four (24) hours. Test blood sugar up to 3 times daily   Dx E11.9 HYDROcodone-acetaminophen  mg tablet Commonly known as:  NORCO  
TAKE 1 TABLET BY MOUTH TWICE A DAY AS NEEDED FOR PAIN  
  
 levothyroxine 100 mcg tablet Commonly known as:  SYNTHROID Take 1 Tab by mouth Daily (before breakfast). Indications: hypothyroidism  
  
 metFORMIN 1,000 mg tablet Commonly known as:  GLUCOPHAGE Take 1 Tab by mouth two (2) times daily (with meals). Indications: type 2 diabetes mellitus  
  
 omeprazole 20 mg capsule Commonly known as:  PRILOSEC Take 1 Cap by mouth daily. * Notice: This list has 2 medication(s) that are the same as other medications prescribed for you. Read the directions carefully, and ask your doctor or other care provider to review them with you. Prescriptions Sent to Pharmacy Refills  
 clobetasol (TEMOVATE) 0.05 % topical cream 3 Sig: Apply  to affected area two (2) times a day. Class: Normal  
 Pharmacy: [id number]- [address] [facility], [address] [place] [address] Dr Ph #: [phone] Route: Topical  
 clotrimazole (LOTRIMIN) 1 % topical cream 0 Sig: Apply  to affected area two (2) times a day. Class: Normal  
 Pharmacy: [facility] #[id number]- [address] [facility], [address] [place] [address] Dr Ph #: [phone] Route: Topical  
  
We Performed the Following REFERRAL TO ORTHOPEDIC SURGERY [REF62 Custom] Comments:  
 Right shoulder pain Referral Information Referral ID Referred By Referred To  
  
 [id number] [facility]   
   [address] [address] [place], [address], [address] [facility] Visits Status Start Date End Date 1 New Request 2/2/18 2/2/19 If your referral has a status of pending review or denied, additional information will be sent to support the outcome of this decision. Introducing [facility] SERVICES! Dear Lisa Contreras: Thank you for requesting a [employer] account. Our records indicate that you already have an active [employer] account. You can access your account anytime at [email or web address] Did you know that you can access your hospital and ER discharge instructions at any time in [employer]D? You can also review all of your test results from your hospital stay or ER visit. Additional Information If you have questions, please visit the Frequently Asked Questions section of the [employer] website at [email or web address] Remember, [employer]D is NOT to be used for urgent needs. For medical emergencies, dial 911. Now available from your iPhone and Android! Please provide this summary of care documentation to your next provider. Your primary care clinician is listed as Ricardo Quigley. If you have any questions after today's visit, please call [phone].

## 2018-02-03 NOTE — PROGRESS NOTES
HISTORY OF PRESENT ILLNESS  Negrita Schneider is a 67 y.o. male. HPI  Pt C/O right shoulder pain. Dull pain over anterior shoulder. Started after he received Prevnar 13 vaccine. Worse with certain positions such as when arm is leaning on car window or when he is working on puzzle on top of a table. Prior hx of rotator cuff surgery about 3-4 years ago. Also need refill of cortisone cream for eczema. Also C/O skin rash on corner of mouth. He has been applying clotrimazole cream with good relief. He had DM labs done last month. A1C under good control. Pt has signed Pain Medicine contract signed for maintenance Hydrocodone prescription to manage back pain due to lumbar spondylosis. Review of Systems   Musculoskeletal: Positive for back pain and joint pain. Skin: Positive for rash. All other systems reviewed and are negative. Patient Active Problem List   Diagnosis Code    Mixed hyperlipidemia E78.2    Obese E66.9    Chronic back pain M54.9, G89.29    Acquired hypothyroidism E03.9    GERD (gastroesophageal reflux disease) K21.9    Diabetes mellitus type 2, controlled ([facility] E11.9    Polyposis coli D12.6    Spondylosis of lumbar joint M47.816    Skin cancer of nose C44.301    Actinic keratosis L57.0    Atopic dermatitis L20.9    Atrial fibrillation with RVR ([facility]) I48.91     Current Outpatient Prescriptions on File Prior to Visit   Medication Sig Dispense Refill    clobetasol (TEMOVATE) 0.05 % external solution Apply twice a day. Indications: Dermatosis of the Scalp 150 mL 3    apixaban (ELIQUIS) 5 mg tablet Take 1 Tab by mouth two (2) times a day. Indications: PREVENT THROMBOEMBOLISM IN CHRONIC ATRIAL FIBRILLATION 180 Tab 3    levothyroxine (SYNTHROID) 100 mcg tablet Take 1 Tab by mouth Daily (before breakfast). Indications: hypothyroidism 90 Tab 3    atorvastatin (LIPITOR) 10 mg tablet Take 1 Tab by mouth daily.  Indications: hypercholesterolemia 90 Tab 3    omeprazole (PRILOSEC) 20 mg capsule Take 1 Cap by mouth daily. 90 Cap 3    metFORMIN (GLUCOPHAGE) 1,000 mg tablet Take 1 Tab by mouth two (2) times daily (with meals). Indications: type 2 diabetes mellitus 180 Tab 3    dilTIAZem (CARDIZEM) 30 mg tablet Take 1 Tab by mouth two (2) times a day. 180 Tab 3    aspirin 81 mg chewable tablet Take 1 Tab by mouth daily. 30 Tab 0    FREESTYLE LITE STRIPS strip 100 Each by Does Not Apply route once over twenty-four (24) hours. Test blood sugar up to 3 times daily   Dx E11.9 100 Strip 5    FREESTYLE LANCETS 28 gauge misc 1 Package by IntraDERMal route once over twenty-four (24) hours. 4    HYDROcodone-acetaminophen (NORCO)  mg tablet TAKE 1 TABLET BY MOUTH TWICE A DAY AS NEEDED FOR PAIN 60 Tab 0     No current facility-administered medications on file prior to visit. Visit Vitals    /62 (BP 1 Location: Right arm, BP Patient Position: Sitting)  Comment: galdino    Pulse 71    Temp 97.8 °F (36.6 °C) (Oral)    Resp 20    Ht 5' 7\" (1.702 m)    Wt 203 lb 6.4 oz (92.3 kg)    SpO2 96%    BMI 31.86 kg/m2       Physical Exam   Constitutional: He appears well-developed and well-nourished. HENT:   Mouth/Throat:       Small red fissure on left corner of mouth. Musculoskeletal:        Right shoulder: He exhibits decreased range of motion and tenderness. He exhibits no bony tenderness, no swelling, no effusion and no crepitus. Lumbar back: He exhibits tenderness and bony tenderness. He exhibits normal range of motion, no swelling and no edema. Vitals reviewed. ASSESSMENT and PLAN    ICD-10-CM ICD-9-CM    1. Controlled type 2 diabetes mellitus without complication, without long-term current use of insulin ([facility]) E11.9 250.00    2. Eczema, unspecified type L30.9 692.9 clobetasol (TEMOVATE) 0.05 % topical cream   3. Chronic midline low back pain, with sciatica presence unspecified M54.5 724.2     G89.29 338.29    4. Opioid use agreement exists Z02.89 V68.89    5. Other osteoarthritis of spine, lumbar region M47.896 721.3    6. Right shoulder pain, unspecified chronicity M25.511 719.41 REFERRAL TO ORTHOPEDIC SURGERY   7. Angular cheilitis K13.0 528. 5 clotrimazole (LOTRIMIN) 1 % topical cream

## 2018-02-26 ENCOUNTER — OFFICE VISIT (OUTPATIENT)
Dept: FAMILY MEDICINE CLINIC | Age: 73
End: 2018-02-26

## 2018-02-26 VITALS
RESPIRATION RATE: 20 BRPM | WEIGHT: 200.2 LBS | DIASTOLIC BLOOD PRESSURE: 60 MMHG | SYSTOLIC BLOOD PRESSURE: 110 MMHG | TEMPERATURE: 97.3 F | OXYGEN SATURATION: 97 % | HEIGHT: 67 IN | BODY MASS INDEX: 31.42 KG/M2 | HEART RATE: 76 BPM

## 2018-02-26 DIAGNOSIS — Z12.5 SPECIAL SCREENING FOR MALIGNANT NEOPLASM OF PROSTATE: ICD-10-CM

## 2018-02-26 DIAGNOSIS — M47.896 OTHER OSTEOARTHRITIS OF SPINE, LUMBAR REGION: Chronic | ICD-10-CM

## 2018-02-26 DIAGNOSIS — Z00.00 MEDICARE ANNUAL WELLNESS VISIT, SUBSEQUENT: Primary | ICD-10-CM

## 2018-02-26 DIAGNOSIS — E03.9 ACQUIRED HYPOTHYROIDISM: Chronic | ICD-10-CM

## 2018-02-26 DIAGNOSIS — Z12.11 SCREEN FOR COLON CANCER: ICD-10-CM

## 2018-02-26 RX ORDER — CLOBETASOL PROPIONATE 0.46 MG/ML
SOLUTION TOPICAL
COMMUNITY
Start: 2018-01-29

## 2018-02-26 RX ORDER — TRIAMCINOLONE ACETONIDE 1 MG/G
CREAM TOPICAL
Refills: 5 | Status: ON HOLD | COMMUNITY
Start: 2018-01-30 | End: 2021-05-03

## 2018-02-26 RX ORDER — HYDROCODONE BITARTRATE AND ACETAMINOPHEN 10; 325 MG/1; MG/1
TABLET ORAL
Qty: 60 TAB | Refills: 0 | Status: SHIPPED | OUTPATIENT
Start: 2018-02-26 | End: 2018-03-26 | Stop reason: SDUPTHER

## 2018-02-26 RX ORDER — ASCORBIC ACID 500 MG
1000 TABLET ORAL DAILY
COMMUNITY

## 2018-02-26 RX ORDER — BISMUTH SUBSALICYLATE 262 MG
1 TABLET,CHEWABLE ORAL DAILY
COMMUNITY

## 2018-02-26 NOTE — ACP (ADVANCE CARE PLANNING)
Advance Care Planning (ACP) Provider Conversation Snapshot    Date of ACP Conversation: 02/26/18  Persons included in Conversation:  patient  Length of ACP Conversation in minutes:  <16 minutes (Non-Billable)    Authorized Decision Maker (if patient is incapable of making informed decisions):    This person is:   Healthcare Agent/Medical Power of  under Advance Directive          For Patients with Decision Making Capacity:   Values/Goals: Exploration of values, goals, and preferences if recovery is not expected, even with continued medical treatment in the event of:  Imminent death  Severe, permanent brain injury    Conversation Outcomes / Follow-Up Plan:   Recommended completion of Advance Directive form after review of ACP materials and conversation with prospective healthcare agent   Recommended communicating the plan and making copies for the healthcare agent, personal physician, and others as appropriate (e.g., health system)

## 2018-02-26 NOTE — PROGRESS NOTES
Identified pt with two pt identifiers(name and ). Chief Complaint   Patient presents with    Fall     fell sat and hit arm of chair with left side    Medication Refill        Health Maintenance Due   Topic    EYE EXAM RETINAL OR DILATED Q1     MEDICARE YEARLY EXAM     FOBT Q 1 YEAR AGE 50-75     GLAUCOMA SCREENING Q2Y    VEI - 3 weeks    Wt Readings from Last 3 Encounters:   18 200 lb 3.2 oz (90.8 kg)   18 203 lb 6.4 oz (92.3 kg)   18 200 lb 6.4 oz (90.9 kg)     Temp Readings from Last 3 Encounters:   18 97.3 °F (36.3 °C) (Oral)   18 97.8 °F (36.6 °C) (Oral)   18 97.9 °F (36.6 °C) (Oral)     BP Readings from Last 3 Encounters:   18 110/60   18 134/62   18 136/78     Pulse Readings from Last 3 Encounters:   18 76   18 71   18 65         Learning Assessment:  :     Learning Assessment 2016   PRIMARY LEARNER Patient Patient   HIGHEST LEVEL OF EDUCATION - PRIMARY LEARNER  2 YEARS OF COLLEGE -   BARRIERS PRIMARY LEARNER NONE -   CO-LEARNER CAREGIVER No -   PRIMARY LANGUAGE ENGLISH ENGLISH   LEARNER PREFERENCE PRIMARY LISTENING DEMONSTRATION   ANSWERED BY self Patient   RELATIONSHIP SELF SELF       Depression Screening:  :     PHQ over the last two weeks 2018   Little interest or pleasure in doing things Not at all   Feeling down, depressed or hopeless Not at all   Total Score PHQ 2 0       Fall Risk Assessment:  :     Fall Risk Assessment, last 12 mths 2018   Able to walk? Yes   Fall in past 12 months? No       Abuse Screening:  :     Abuse Screening Questionnaire 2018   Do you ever feel afraid of your partner? N N   Are you in a relationship with someone who physically or mentally threatens you? N N   Is it safe for you to go home? Y Y       Coordination of Care Questionnaire:  :     1) Have you been to an emergency room, urgent care clinic since your last visit? no   Hospitalized since your last visit? no             2) Have you seen or consulted any other health care providers outside of [address] [place] [address] since your last visit? yes  Will see [address] [facility] podiatry (Include any pap smears or colon screenings in this section.)    3) Do you have an Advance Directive on file? DNR on file  Are you interested in receiving information about Advance Directives? Dr Jaylin Hampton gave    Reviewed record in preparation for visit and have obtained necessary documentation. Medication reconciliation up to date and corrected with patient at this time.

## 2018-02-26 NOTE — PROGRESS NOTES
This is a Subsequent Medicare Annual Wellness Exam (AWV) (Performed 12 months after IPPE or effective date of Medicare Part B enrollment)    I have reviewed the patient's medical history in detail and updated the computerized patient record. History     Past Medical History:   Diagnosis Date    Acid reflux     Cardiac dysrhythmia, unspecified 12/21/2011    Diabetes ([address]     GERD (gastroesophageal reflux disease)     Headache(784.0) 12/21/2011    Hypercholesterolemia     Hypothyroid     Obesity     PUD (peptic ulcer disease)     TIA (transient ischemic attack)     2014      Past Surgical History:   Procedure Laterality Date    HX GASTRECTOMY      HX HEENT      left retroorbital tumor resection    HX MOHS PROCEDURES Right     right shoulder    HX TUMOR REMOVAL  2015    Left Eye     Current Outpatient Prescriptions   Medication Sig Dispense Refill    triamcinolone acetonide (KENALOG) 0.1 % topical cream APPLY TWICE A DAY AS DIRECTED  5    clobetasol (TEMOVATE) 0.05 % external solution       multivitamin (ONE A DAY) tablet Take 1 Tab by mouth daily.  ascorbic acid, vitamin C, (VITAMIN C) 500 mg tablet Take 1,000 mg by mouth daily.  HYDROcodone-acetaminophen (NORCO)  mg tablet TAKE 1 TABLET BY MOUTH TWICE A DAY AS NEEDED FOR PAIN 60 Tab 0    clobetasol (TEMOVATE) 0.05 % topical cream Apply  to affected area two (2) times a day. 60 Tube 3    clotrimazole (LOTRIMIN) 1 % topical cream Apply  to affected area two (2) times a day. 15 g 0    apixaban (ELIQUIS) 5 mg tablet Take 1 Tab by mouth two (2) times a day. Indications: PREVENT THROMBOEMBOLISM IN CHRONIC ATRIAL FIBRILLATION 180 Tab 3    levothyroxine (SYNTHROID) 100 mcg tablet Take 1 Tab by mouth Daily (before breakfast). Indications: hypothyroidism 90 Tab 3    atorvastatin (LIPITOR) 10 mg tablet Take 1 Tab by mouth daily.  Indications: hypercholesterolemia 90 Tab 3    omeprazole (PRILOSEC) 20 mg capsule Take 1 Cap by mouth daily. 90 Cap 3    metFORMIN (GLUCOPHAGE) 1,000 mg tablet Take 1 Tab by mouth two (2) times daily (with meals). Indications: type 2 diabetes mellitus 180 Tab 3    dilTIAZem (CARDIZEM) 30 mg tablet Take 1 Tab by mouth two (2) times a day. 180 Tab 3    aspirin 81 mg chewable tablet Take 1 Tab by mouth daily. 30 Tab 0    FREESTYLE LITE STRIPS strip 100 Each by Does Not Apply route once over twenty-four (24) hours. Test blood sugar up to 3 times daily   Dx E11.9 100 Strip 5    FREESTYLE LANCETS 28 gauge misc 1 Package by IntraDERMal route once over twenty-four (24) hours. 4     Allergies   Allergen Reactions    Ambien [Zolpidem] Other (comments)     Sleep walking  Doing things like cooking but he was not awake     Family History   Problem Relation Age of Onset    Diabetes Sister     Cancer Sister      lung    Diabetes Mother     Cancer Father      brain    Diabetes Maternal Grandmother     Hypertension Neg Hx      Social History   Substance Use Topics    Smoking status: Never Smoker    Smokeless tobacco: Never Used    Alcohol use No     Patient Active Problem List   Diagnosis Code    Mixed hyperlipidemia E78.2    Obese E66.9    Chronic back pain M54.9, G89.29    Acquired hypothyroidism E03.9    GERD (gastroesophageal reflux disease) K21.9    Diabetes mellitus type 2, controlled ([facility] E11.9    Polyposis coli D12.6    Spondylosis of lumbar joint M47.816    Skin cancer of nose C44.301    Actinic keratosis L57.0    Atopic dermatitis L20.9    Atrial fibrillation with RVR (HCC) I48.91       Depression Risk Factor Screening:     PHQ over the last two weeks 2/2/2018   Little interest or pleasure in doing things Not at all   Feeling down, depressed or hopeless Not at all   Total Score PHQ 2 0     Alcohol Risk Factor Screening: You do not drink alcohol or very rarely. Functional Ability and Level of Safety:   Hearing Loss  Hearing is good.     Activities of Daily Living  The home contains: no safety equipment. Patient does total self care    Fall Risk  Fall Risk Assessment, last 12 mths 2/2/2018   Able to walk? Yes   Fall in past 12 months? No       Abuse Screen  Patient is not abused    Cognitive Screening   Evaluation of Cognitive Function:  Has your family/caregiver stated any concerns about your memory: no  Normal    Patient Care Team   Patient Care Team:  Allyson Chan MD as PCP - General (Family Practice)  Clark Jack MD (Cardiology)    Assessment/Plan   Education and counseling provided:  Are appropriate based on today's review and evaluation  End-of-Life planning (with patient's consent)  Pneumococcal Vaccine  Influenza Vaccine  Prostate cancer screening tests (PSA, covered annually)  Colorectal cancer screening tests  Screening for glaucoma  Diabetes screening test    Diagnoses and all orders for this visit:    1. Medicare annual wellness visit, subsequent    2. Acquired hypothyroidism    3. Other osteoarthritis of spine, lumbar region  -     HYDROcodone-acetaminophen (NORCO)  mg tablet; TAKE 1 TABLET BY MOUTH TWICE A DAY AS NEEDED FOR PAIN    4. Screen for colon cancer  -     OCCULT BLOOD, IMMUNOASSAY (FIT) ([id number])    5. Special screening for malignant neoplasm of prostate  -     Digital Rectal Exam ()  -     PSA Screening ([id number])        Health Maintenance Due   Topic Date Due    FOBT Q 1 YEAR AGE 50-75  04/15/2016    EYE EXAM RETINAL OR DILATED Q1  01/11/2017    GLAUCOMA SCREENING Q2Y  01/11/2018     FU in 2 months for repeat lipids, thyroid labs.   Request eye exam from Dr. Foster Wilson and ALEX

## 2018-02-26 NOTE — MR AVS SNAPSHOT
[address] [facility] 
 
 
 [address] [facility] 
[phone] Patient: Rupinder Beckford MRN: [id number] :1945 Visit Information Date & Time Provider Department Dept. Phone Encounter #  
 3/11/8362 16:90 AM Bell Avila  [facility] [phone] [id number] Follow-up Instructions Return in about 2 months (around 2018) for fasting labs, thyroid labs. Your Appointments 2018 11:15 AM  
ROUTINE CARE with Bell Avila MD  
[address] [place] [address] Road) Appt Note: med check [address] Nar[address] [place] [address]  
[phone]  
  
   
 [address] Narvaez [address] [place] [address]  
  
    
 2018  9:00 AM  
ROUTINE CARE with Bell Avila MD  
[address]) Appt Note: 3 month fasting follow up [address]so[address]ette Narvaez [address] [facility]  
[phone] Upcoming Health Maintenance Date Due  
 EYE EXAM RETINAL OR DILATED Q1 2017 FOBT Q 1 YEAR AGE 50-75 8/10/2017 GLAUCOMA SCREENING Q2Y 2018 HEMOGLOBIN A1C Q6M 2018 FOOT EXAM Q1 2019 MICROALBUMIN Q1 2019 LIPID PANEL Q1 2019 MEDICARE YEARLY EXAM 2019 DTaP/Tdap/Td series (2 - Td) 2020 Allergies as of 2018  Review Complete On: 1911 By: Bell Avila MD  
  
 Severity Noted Reaction Type Reactions Ambien [Zolpidem] High 2016   Side Effect Other (comments) Sleep walking Doing things like cooking but he was not awake Current Immunizations  Reviewed on 2018 Name Date Influenza High Dose Vaccine PF 10/17/2017, 2016 10:51 AM  
 Influenza Vaccine 2015, 10/2/2013 Pneumococcal Conjugate (PCV-13) 1/15/2018 Pneumococcal Polysaccharide (PPSV-23) 2014 TB Skin Test (PPD) 2009 Tdap 2010 Zoster Vaccine, Live 3/15/2016 Reviewed by Joi Irvin MD on 2/26/2018 at 10:46 AM  
You Were Diagnosed With   
  
 Codes Comments Medicare annual wellness visit, subsequent    -  Primary ICD-10-CM: Z00.00 ICD-9-CM: V70.0 Acquired hypothyroidism     ICD-10-CM: E03.9 ICD-9-CM: 244.9 Other osteoarthritis of spine, lumbar region     ICD-10-CM: M47.896 ICD-9-CM: 721.3 Screen for colon cancer     ICD-10-CM: Z12.11 ICD-9-CM: V76.51 Special screening for malignant neoplasm of prostate     ICD-10-CM: Z12.5 ICD-9-CM: V76.44 Vitals BP Pulse Temp Resp Height(growth percentile) Weight(growth percentile) 110/60 (BP 1 Location: Right arm, BP Patient Position: Sitting) 76 97.3 °F (36.3 °C) (Oral) 20 5' 7\" (1.702 m) 200 lb 3.2 oz (90.8 kg) SpO2 BMI Smoking Status 97% 31.36 kg/m2 Never Smoker BMI and BSA Data Body Mass Index Body Surface Area  
 31.36 kg/m 2 2.07 m 2 Preferred Pharmacy Pharmacy Name Phone [id number] [phone]  [phone] Your Updated Medication List  
  
   
This list is accurate as of 2/26/18 11:04 AM.  Always use your most recent med list.  
  
  
  
  
 apixaban 5 mg tablet Commonly known as:  Guinevere Bouche Take 1 Tab by mouth two (2) times a day. Indications: PREVENT THROMBOEMBOLISM IN CHRONIC ATRIAL FIBRILLATION  
  
 ascorbic acid (vitamin C) 500 mg tablet Commonly known as:  VITAMIN C Take 1,000 mg by mouth daily. aspirin 81 mg chewable tablet Take 1 Tab by mouth daily. atorvastatin 10 mg tablet Commonly known as:  LIPITOR Take 1 Tab by mouth daily. Indications: hypercholesterolemia * clobetasol 0.05 % external solution Commonly known as:  Katlyn Couch * clobetasol 0.05 % topical cream  
Commonly known as:  Katlyn Couch Apply  to affected area two (2) times a day. clotrimazole 1 % topical cream  
Commonly known as:  [place] Agent Apply  to affected area two (2) times a day. dilTIAZem 30 mg tablet Commonly known as:  CARDIZEM Take 1 Tab by mouth two (2) times a day. FREESTYLE LANCETS 28 gauge Misc Generic drug:  lancets 1 Package by IntraDERMal route once over twenty-four (24) hours. FREESTYLE LITE STRIPS strip Generic drug:  glucose blood VI test strips 100 Each by Does Not Apply route once over twenty-four (24) hours. Test blood sugar up to 3 times daily   Dx E11.9 HYDROcodone-acetaminophen  mg tablet Commonly known as:  NORCO  
TAKE 1 TABLET BY MOUTH TWICE A DAY AS NEEDED FOR PAIN  
  
 levothyroxine 100 mcg tablet Commonly known as:  SYNTHROID Take 1 Tab by mouth Daily (before breakfast). Indications: hypothyroidism  
  
 metFORMIN 1,000 mg tablet Commonly known as:  GLUCOPHAGE Take 1 Tab by mouth two (2) times daily (with meals). Indications: type 2 diabetes mellitus  
  
 multivitamin tablet Commonly known as:  ONE A DAY Take 1 Tab by mouth daily. omeprazole 20 mg capsule Commonly known as:  PRILOSEC Take 1 Cap by mouth daily. triamcinolone acetonide 0.1 % topical cream  
Commonly known as:  KENALOG  
APPLY TWICE A DAY AS DIRECTED * Notice: This list has 2 medication(s) that are the same as other medications prescribed for you. Read the directions carefully, and ask your doctor or other care provider to review them with you. Prescriptions Printed Refills HYDROcodone-acetaminophen (NORCO)  mg tablet 0 Sig: TAKE 1 TABLET BY MOUTH TWICE A DAY AS NEEDED FOR PAIN Class: Print We Performed the Following OCCULT BLOOD, IMMUNOASSAY (FIT) [id number] CPT(R)] TN PROSTATE CA SCREENING; SANDEE [ HCP] PSA SCREENING (SCREENING) [ HCP] Follow-up Instructions Return in about 2 months (around 4/26/2018) for fasting labs, thyroid labs. Patient Instructions Medicare Wellness Visit, Male The best way to live healthy is to have a healthy lifestyle by eating a well-balanced diet, exercising regularly, limiting alcohol and stopping smoking. Regular physical exams and screening tests are another way to keep healthy. Preventive exams provided by your health care provider can find health problems before they become diseases or illnesses. Preventive services including immunizations, screening tests, monitoring and exams can help you take care of your own health. All people over age 72 should have a pneumovax  and and a prevnar shot to prevent pneumonia. These are once in a lifetime unless you and your provider decide differently. All people over 65 should have a yearly flu shot and a tetanus vaccine every 10 years. Screening for diabetes mellitus with a blood sugar test should be done every year. Glaucoma is a disease of the eye due to increased ocular pressure that can lead to blindness and it should be done every year by an eye professional. 
 
Cardiovascular screening tests that check for elevated lipids (fatty part of blood) which can lead to heart disease and strokes should be done every 5 years. Colorectal screening that evaluates for blood or polyps in your colon should be done yearly as a stool test or every five years as a flexible sigmoidoscope or every 10 years as a colonoscopy up to age 76. Men up to age 76 may need a screening blood test for prostate cancer at certain intervals, depending on their personal and family history. This decision is between the patient and his provider. If you have been a smoker or had family history of abdominal aortic aneurysms, you and your provider may decide to schedule an ultrasound test of your aorta. Hepatitis C screening is also recommended for anyone born between [birth year] through [address]. A shingles vaccine is also recommended once in a lifetime after age 61. Your Medicare Wellness Exam is recommended annually. Here is a list of your current Health Maintenance items with a due date: 
Health Maintenance Due Topic Date Due [facility] Eye Exam  01/11/2017 [facility] Annual Well Visit  02/19/2017  Stool testing for trace blood  08/10/2017  Glaucoma Screening   01/11/2018 Introducing [facility]! Dear Pranav Cannon: Thank you for requesting a Lightbox account. Our records indicate that you already have an active Lightbox account. You can access your account anytime at [email or web address] Did you know that you can access your hospital and ER discharge instructions at any time in Lightbox? You can also review all of your test results from your hospital stay or ER visit. Additional Information If you have questions, please visit the Frequently Asked Questions section of the Lightbox website at [email or web address] Remember, Lightbox is NOT to be used for urgent needs. For medical emergencies, dial 911. Now available from your iPhone and Android! Please provide this summary of care documentation to your next provider. Your primary care clinician is listed as Wes Guevara. If you have any questions after today's visit, please call [phone].

## 2018-02-26 NOTE — LETTER
2/28/2018 8:20 AM 
 
Mr. Radha Duran [address] [phone] Dear Radha Metcalf: Please find your most recent results below. Resulted Orders PSA SCREENING (SCREENING) Result Value Ref Range Prostate Specific Ag 2.2 0.0 - 4.0 ng/mL Comment:  
   Roche ECLIA methodology. According to the American Urological Association, Serum PSA should 
decrease and remain at undetectable levels after radical 
prostatectomy. The AUA defines biochemical recurrence as an initial 
PSA value 0.2 ng/mL or greater followed by a subsequent confirmatory PSA value 0.2 ng/mL or greater. Values obtained with different assay methods or kits cannot be used 
interchangeably. Results cannot be interpreted as absolute evidence 
of the presence or absence of malignant disease. Narrative Performed at:  [address]  [id number] : Jay Jay Gracia MD, Phone:  [phone] RECOMMENDATIONS: 
Normal PSA level (prostate test). Please call me if you have any questions: [phone] Sincerely, Braeden Sargent MD

## 2018-02-26 NOTE — PATIENT INSTRUCTIONS

## 2018-02-27 LAB — PSA SERPL-MCNC: 2.2 NG/ML (ref 0–4)

## 2018-03-09 ENCOUNTER — TELEPHONE (OUTPATIENT)
Dept: FAMILY MEDICINE CLINIC | Age: 73
End: 2018-03-09

## 2018-03-09 DIAGNOSIS — K63.5 POLYP OF COLON, UNSPECIFIED PART OF COLON, UNSPECIFIED TYPE: ICD-10-CM

## 2018-03-09 DIAGNOSIS — R19.5 POSITIVE OCCULT STOOL BLOOD TEST: Primary | ICD-10-CM

## 2018-03-09 LAB — HEMOCCULT STL QL IA: POSITIVE

## 2018-03-09 NOTE — TELEPHONE ENCOUNTER
Call pt. Positive stool occult blood test.  Will need to refer GI for colonoscopy. Last colonoscopy done Dr. Oleg Griffith in 2016 but removed a lot of polyps. Will need [facility] authorization.

## 2018-03-14 ENCOUNTER — OFFICE VISIT (OUTPATIENT)
Dept: FAMILY MEDICINE CLINIC | Age: 73
End: 2018-03-14

## 2018-03-14 VITALS
RESPIRATION RATE: 20 BRPM | BODY MASS INDEX: 31.2 KG/M2 | DIASTOLIC BLOOD PRESSURE: 60 MMHG | SYSTOLIC BLOOD PRESSURE: 128 MMHG | TEMPERATURE: 98 F | HEART RATE: 86 BPM | WEIGHT: 198.8 LBS | HEIGHT: 67 IN | OXYGEN SATURATION: 97 %

## 2018-03-14 DIAGNOSIS — R19.5 POSITIVE OCCULT STOOL BLOOD TEST: ICD-10-CM

## 2018-03-14 DIAGNOSIS — I48.91 ATRIAL FIBRILLATION WITH RVR (HCC): Primary | ICD-10-CM

## 2018-03-14 NOTE — PROGRESS NOTES
HISTORY OF PRESENT ILLNESS  Sabi Herzog is a 67 y.o. male. HPI  Pt scheduled for colonoscopy by Dr. Alesha Moody on 3/23/18 for positive occult blood in stool. He has long hx of colon polyps. Last colonoscopy done 2/2017. He is taking Eliquis for atrial fib. ROS  Visit Vitals    /60 (BP 1 Location: Right arm, BP Patient Position: Sitting)  Comment: manual    Pulse 86    Temp 98 °F (36.7 °C) (Oral)    Resp 20    Ht 5' 7\" (1.702 m)    Wt 198 lb 12.8 oz (90.2 kg)    SpO2 97%    BMI 31.14 kg/m2       Physical Exam    ASSESSMENT and PLAN    ICD-10-CM ICD-9-CM    1. Atrial fibrillation with RVR (HCC) I48.91 427.31    2. Positive occult stool blood test R19.5 792.1      Instructed to hold Eliquis x 3 days prior to procedure and restart it 2 days after procedure. Form faxed to GI office. Pt verbalizes understanding of plan of care and denies further questions or concerns at this time.

## 2018-03-14 NOTE — PROGRESS NOTES
Identified pt with two pt identifiers(name and ). Chief Complaint   Patient presents with    Rectal Bleeding     colonoscopy 3/23/18 Dr Yina Lizarraga        There are no preventive care reminders to display for this patient. Wt Readings from Last 3 Encounters:   18 198 lb 12.8 oz (90.2 kg)   18 200 lb 3.2 oz (90.8 kg)   18 203 lb 6.4 oz (92.3 kg)     Temp Readings from Last 3 Encounters:   18 98 °F (36.7 °C) (Oral)   18 97.3 °F (36.3 °C) (Oral)   18 97.8 °F (36.6 °C) (Oral)     BP Readings from Last 3 Encounters:   18 128/60   18 110/60   18 134/62     Pulse Readings from Last 3 Encounters:   18 86   18 76   18 71         Learning Assessment:  :     Learning Assessment 2016   PRIMARY LEARNER Patient Patient   HIGHEST LEVEL OF EDUCATION - PRIMARY LEARNER  2 YEARS OF COLLEGE -   BARRIERS PRIMARY LEARNER NONE -   CO-LEARNER CAREGIVER No -   PRIMARY LANGUAGE ENGLISH ENGLISH   LEARNER PREFERENCE PRIMARY LISTENING DEMONSTRATION   ANSWERED BY self Patient   RELATIONSHIP SELF SELF       Depression Screening:  :     PHQ over the last two weeks 2018   Little interest or pleasure in doing things Not at all   Feeling down, depressed or hopeless Not at all   Total Score PHQ 2 0       Fall Risk Assessment:  :     Fall Risk Assessment, last 12 mths 2018   Able to walk? Yes   Fall in past 12 months? No       Abuse Screening:  :     Abuse Screening Questionnaire 2018   Do you ever feel afraid of your partner? N N   Are you in a relationship with someone who physically or mentally threatens you? N N   Is it safe for you to go home?  Y Y       Coordination of Care Questionnaire:  :     1) Have you been to an emergency room, urgent care clinic since your last visit? no   Hospitalized since your last visit? no             2) Have you seen or consulted any other health care providers outside of [address] [place] [address] since your last visit? no  (Include any pap smears or colon screenings in this section.)    3) Do you have an Advance Directive on file? yes  Are you interested in receiving information about Advance Directives? no    Reviewed record in preparation for visit and have obtained necessary documentation. Medication reconciliation up to date and corrected with patient at this time.

## 2018-03-14 NOTE — PATIENT INSTRUCTIONS
Colon Polyps: Care Instructions  Your Care Instructions    Colon polyps are growths in the colon or the rectum. The cause of most colon polyps is not known, and most people who get them do not have any problems. But a certain kind can turn into cancer. For this reason, regular testing for colon polyps is important for people age 48 and older and anyone who has an increased risk for colon cancer. Polyps are usually found through routine colon cancer screening tests. Although most colon polyps are not cancerous, they are usually removed and then tested for cancer. Screening for colon cancer saves lives because the cancer can usually be cured if it is caught early. If you have a polyp that is the type that can turn into cancer, you may need more tests to examine your entire colon. The doctor will remove any other polyps that he or she finds, and you will be tested more often. Follow-up care is a key part of your treatment and safety. Be sure to make and go to all appointments, and call your doctor if you are having problems. It's also a good idea to know your test results and keep a list of the medicines you take. How can you care for yourself at home? Regular exams to look for colon polyps are the best way to prevent polyps from turning into colon cancer. These can include stool tests, sigmoidoscopy, colonoscopy, and CT colonography. Talk with your doctor about a testing schedule that is right for you. To prevent polyps  There is no home treatment that can prevent colon polyps. But these steps may help lower your risk for cancer. · Stay active. Being active can help you get to and stay at a healthy weight. Try to exercise on most days of the week. Walking is a good choice. · Eat well. Choose a variety of vegetables, fruits, legumes (such as peas and beans), fish, poultry, and whole grains. · Do not smoke. If you need help quitting, talk to your doctor about stop-smoking programs and medicines.  These can increase your chances of quitting for good. · If you drink alcohol, limit how much you drink. Limit alcohol to 2 drinks a day for men and 1 drink a day for women. When should you call for help? Call your doctor now or seek immediate medical care if:  ? · You have severe belly pain. ? · Your stools are maroon or very bloody. ? Watch closely for changes in your health, and be sure to contact your doctor if:  ? · You have a fever. ? · You have nausea or vomiting. ? · You have a change in bowel habits (new constipation or diarrhea). ? · Your symptoms get worse or are not improving as expected. Where can you learn more? Go to http://catrina-dorie.info/. Enter [phone] [id number] in the search box to learn more about \"Colon Polyps: Care Instructions. \"  Current as of: May 12, 2017  Content Version: 11.4  © 5751-1776 [employer]. Care instructions adapted under license by [employer] (which disclaims liability or warranty for this information). If you have questions about a medical condition or this instruction, always ask your healthcare professional. [place] [address] any warranty or liability for your use of this information.

## 2018-03-14 NOTE — MR AVS SNAPSHOT
[address] [facility] 
 
 
 [address] [facility] 
[phone] Patient: Basim Pena MRN: [id number] :1945 Visit Information Date & Time Provider Department Dept. Phone Encounter #  
   7:65 PM Krysten Seen, Avery Maradiaga [phone] [id number] Your Appointments 2018  9:00 AM  
ROUTINE CARE with Krysten Seen, MD  
[address] [place] [address]) Appt Note: 3 month fasting follow up [address] 1067 [facility]  
  
   
 [address] Upcoming Health Maintenance Date Due HEMOGLOBIN A1C Q6M 2018 EYE EXAM RETINAL OR DILATED Q1 2019 FOOT EXAM Q1 2019 MICROALBUMIN Q1 2019 LIPID PANEL Q1 2019 MEDICARE YEARLY EXAM 2019 FOBT Q 1 YEAR AGE 50-75 3/1/2019 DTaP/Tdap/Td series (2 - Td) 2020 GLAUCOMA SCREENING Q2Y 2020 Allergies as of 3/14/2018  Review Complete On: 3/13/9400 By: Krysten Seen, MD  
  
 Severity Noted Reaction Type Reactions Ambien [Zolpidem] High 2016   Side Effect Other (comments) Sleep walking Doing things like cooking but he was not awake Current Immunizations  Reviewed on 2018 Name Date Influenza High Dose Vaccine PF 10/17/2017, 2016 10:51 AM  
 Influenza Vaccine 2015, 10/2/2013 Pneumococcal Conjugate (PCV-13) 1/15/2018 Pneumococcal Polysaccharide (PPSV-23) 2014 TB Skin Test (PPD) 2009 Tdap 2010 Zoster Vaccine, Live 3/15/2016 Not reviewed this visit You Were Diagnosed With   
  
 Codes Comments Atrial fibrillation with RVR ([facility] [address]    -  Primary ICD-10-CM: I48.91 
ICD-9-CM: 427.31 Positive occult stool blood test     ICD-10-CM: R19.5 ICD-9-CM: 792.1 Vitals BP Pulse Temp Resp Height(growth percentile) Weight(growth percentile) 128/60 (BP 1 Location: Right arm, BP Patient Position: Sitting) 86 98 °F (36.7 °C) (Oral) 20 5' 7\" (1.702 m) 198 lb 12.8 oz (90.2 kg) SpO2 BMI Smoking Status 97% 31.14 kg/m2 Never Smoker BMI and BSA Data Body Mass Index Body Surface Area  
 31.14 kg/m 2 2.06 m 2 Preferred Pharmacy Pharmacy Name Phone [facility]/PHARMACY #[id number]- [address], [address] [place] [address]  [phone] Your Updated Medication List  
  
   
This list is accurate as of 3/14/18  3:25 PM.  Always use your most recent med list.  
  
  
  
  
 apixaban 5 mg tablet Commonly known as:  Andrew Becket Take 1 Tab by mouth two (2) times a day. Indications: PREVENT THROMBOEMBOLISM IN CHRONIC ATRIAL FIBRILLATION  
  
 ascorbic acid (vitamin C) 500 mg tablet Commonly known as:  VITAMIN C Take 1,000 mg by mouth daily. aspirin 81 mg chewable tablet Take 1 Tab by mouth daily. atorvastatin 10 mg tablet Commonly known as:  LIPITOR Take 1 Tab by mouth daily. Indications: hypercholesterolemia * clobetasol 0.05 % external solution Commonly known as:  Solis Alistair * clobetasol 0.05 % topical cream  
Commonly known as:  Solis Alistair Apply  to affected area two (2) times a day. clotrimazole 1 % topical cream  
Commonly known as:  Celestia Holbrook Apply  to affected area two (2) times a day. dilTIAZem 30 mg tablet Commonly known as:  CARDIZEM Take 1 Tab by mouth two (2) times a day. FREESTYLE LANCETS 28 gauge Misc Generic drug:  lancets 1 Package by IntraDERMal route once over twenty-four (24) hours. FREESTYLE LITE STRIPS strip Generic drug:  glucose blood VI test strips 100 Each by Does Not Apply route once over twenty-four (24) hours. Test blood sugar up to 3 times daily   Dx E11.9 HYDROcodone-acetaminophen  mg tablet Commonly known as:  Chanel Walter  
 TAKE 1 TABLET BY MOUTH TWICE A DAY AS NEEDED FOR PAIN  
  
 levothyroxine 100 mcg tablet Commonly known as:  SYNTHROID Take 1 Tab by mouth Daily (before breakfast). Indications: hypothyroidism  
  
 metFORMIN 1,000 mg tablet Commonly known as:  GLUCOPHAGE Take 1 Tab by mouth two (2) times daily (with meals). Indications: type 2 diabetes mellitus  
  
 multivitamin tablet Commonly known as:  ONE A DAY Take 1 Tab by mouth daily. omeprazole 20 mg capsule Commonly known as:  PRILOSEC Take 1 Cap by mouth daily. triamcinolone acetonide 0.1 % topical cream  
Commonly known as:  KENALOG  
APPLY TWICE A DAY AS DIRECTED * Notice: This list has 2 medication(s) that are the same as other medications prescribed for you. Read the directions carefully, and ask your doctor or other care provider to review them with you. Patient Instructions Colon Polyps: Care Instructions Your Care Instructions Colon polyps are growths in the colon or the rectum. The cause of most colon polyps is not known, and most people who get them do not have any problems. But a certain kind can turn into cancer. For this reason, regular testing for colon polyps is important for people age 48 and older and anyone who has an increased risk for colon cancer. Polyps are usually found through routine colon cancer screening tests. Although most colon polyps are not cancerous, they are usually removed and then tested for cancer. Screening for colon cancer saves lives because the cancer can usually be cured if it is caught early. If you have a polyp that is the type that can turn into cancer, you may need more tests to examine your entire colon. The doctor will remove any other polyps that he or she finds, and you will be tested more often. Follow-up care is a key part of your treatment and safety.  Be sure to make and go to all appointments, and call your doctor if you are having problems. It's also a good idea to know your test results and keep a list of the medicines you take. How can you care for yourself at home? Regular exams to look for colon polyps are the best way to prevent polyps from turning into colon cancer. These can include stool tests, sigmoidoscopy, colonoscopy, and CT colonography. Talk with your doctor about a testing schedule that is right for you. To prevent polyps There is no home treatment that can prevent colon polyps. But these steps may help lower your risk for cancer. · Stay active. Being active can help you get to and stay at a healthy weight. Try to exercise on most days of the week. Walking is a good choice. · Eat well. Choose a variety of vegetables, fruits, legumes (such as peas and beans), fish, poultry, and whole grains. · Do not smoke. If you need help quitting, talk to your doctor about stop-smoking programs and medicines. These can increase your chances of quitting for good. · If you drink alcohol, limit how much you drink. Limit alcohol to 2 drinks a day for men and 1 drink a day for women. When should you call for help? Call your doctor now or seek immediate medical care if: 
? · You have severe belly pain. ? · Your stools are maroon or very bloody. ? Watch closely for changes in your health, and be sure to contact your doctor if: 
? · You have a fever. ? · You have nausea or vomiting. ? · You have a change in bowel habits (new constipation or diarrhea). ? · Your symptoms get worse or are not improving as expected. Where can you learn more? Go to http://catrina-dorie.info/. Enter 95 [id number] in the search box to learn more about \"Colon Polyps: Care Instructions. \" Current as of: May 12, 2017 Content Version: 11.4 © 0695-1799 Healthwise, [place].  Care instructions adapted under license by [employer] (which disclaims liability or warranty for this information). If you have questions about a medical condition or this instruction, always ask your healthcare professional. [address] any warranty or liability for your use of this information. Introducing [facility] & HEALTH SERVICES! Dear Lisa Contreras: Thank you for requesting a [employer] account. Our records indicate that you already have an active [employer] account. You can access your account anytime at [email or web address] Did you know that you can access your hospital and ER discharge instructions at any time in [employer]? You can also review all of your test results from your hospital stay or ER visit. Additional Information If you have questions, please visit the Frequently Asked Questions section of the [employer] website at [email or web address] Remember, [employer] is NOT to be used for urgent needs. For medical emergencies, dial 911. Now available from your iPhone and Android! Please provide this summary of care documentation to your next provider. Your primary care clinician is listed as Ricardo Quigley. If you have any questions after today's visit, please call [phone].

## 2018-03-26 ENCOUNTER — OFFICE VISIT (OUTPATIENT)
Dept: FAMILY MEDICINE CLINIC | Age: 73
End: 2018-03-26

## 2018-03-26 VITALS
TEMPERATURE: 98.2 F | WEIGHT: 197 LBS | OXYGEN SATURATION: 96 % | HEIGHT: 67 IN | SYSTOLIC BLOOD PRESSURE: 128 MMHG | DIASTOLIC BLOOD PRESSURE: 60 MMHG | RESPIRATION RATE: 20 BRPM | HEART RATE: 86 BPM | BODY MASS INDEX: 30.92 KG/M2

## 2018-03-26 DIAGNOSIS — M47.896 OTHER OSTEOARTHRITIS OF SPINE, LUMBAR REGION: Chronic | ICD-10-CM

## 2018-03-26 DIAGNOSIS — K63.5 POLYP OF COLON, UNSPECIFIED PART OF COLON, UNSPECIFIED TYPE: Primary | ICD-10-CM

## 2018-03-26 RX ORDER — HYDROCODONE BITARTRATE AND ACETAMINOPHEN 10; 325 MG/1; MG/1
TABLET ORAL
Qty: 60 TAB | Refills: 0 | Status: SHIPPED | OUTPATIENT
Start: 2018-03-26 | End: 2018-04-25 | Stop reason: SDUPTHER

## 2018-03-26 NOTE — PROGRESS NOTES
HISTORY OF PRESENT ILLNESS  Chasity Eastman is a 67 y.o. male. HPI  Pt had colonoscopy 3/23/18 and a polyp was removed by Dr. Moisés Powell. He also had hemorrhoids. He will go back in 3 years. Pt had positive stool occult test.  Pt also requesting refill for pain med for back arthritis. Pain is stable. He is able to cont doing his martial arts with cane. ROS  Visit Vitals    /60 (BP 1 Location: Right arm, BP Patient Position: Sitting)  Comment: manual    Pulse 86    Temp 98.2 °F (36.8 °C) (Oral)    Resp 20    Ht 5' 7\" (1.702 m)    Wt 197 lb (89.4 kg)    SpO2 96%    BMI 30.85 kg/m2       Physical Exam    ASSESSMENT and PLAN    ICD-10-CM ICD-9-CM    1. Polyp of colon, unspecified part of colon, unspecified type K63.5 211.3    2.  Other osteoarthritis of spine, lumbar region M47.896 721.3 HYDROcodone-acetaminophen (NORCO)  mg tablet

## 2018-03-26 NOTE — MR AVS SNAPSHOT
Romana Halo 
 
 
 [address] [facility] 
[phone] Patient: Sarah Brandon MRN: [id number] :1945 Visit Information Date & Time Provider Department Dept. Phone Encounter #  
 [id number]  8:77 PM Avery Maddox [phone] Your Appointments 2018  9:00 AM  
ROUTINE CARE with Rosalinda Collazo MD  
[address] [facility]) Appt Note: 3 month fasting follow up [address] 1067 [facility]  
  
   
 [address] [place] [address] Upcoming Health Maintenance Date Due HEMOGLOBIN A1C Q6M 2018 EYE EXAM RETINAL OR DILATED Q1 2019 FOOT EXAM Q1 2019 MICROALBUMIN Q1 2019 LIPID PANEL Q1 2019 FOBT Q 1 YEAR AGE 50-75 3/1/2019 DTaP/Tdap/Td series (2 - Td) 2020 GLAUCOMA SCREENING Q2Y 2020 Allergies as of 3/26/2018  Review Complete On: 4966 By: Rosalinda Collazo MD  
  
 Severity Noted Reaction Type Reactions Ambien [Zolpidem] High 2016   Side Effect Other (comments) Sleep walking Doing things like cooking but he was not awake Current Immunizations  Reviewed on 2018 Name Date Influenza High Dose Vaccine PF 10/17/2017, 2016 10:51 AM  
 Influenza Vaccine 2015, 10/2/2013 Pneumococcal Conjugate (PCV-13) 1/15/2018 Pneumococcal Polysaccharide (PPSV-23) 2014 TB Skin Test (PPD) 2009 Tdap 2010 Zoster Vaccine, Live 3/15/2016 Not reviewed this visit You Were Diagnosed With   
  
 Codes Comments Polyp of colon, unspecified part of colon, unspecified type    -  Primary ICD-10-CM: K63.5 ICD-9-CM: 211.3 Other osteoarthritis of spine, lumbar region     ICD-10-CM: M47.896 ICD-9-CM: 721.3 Vitals BP Pulse Temp Resp Height(growth percentile) Weight(growth percentile) 128/60 (BP 1 Location: Right arm, BP Patient Position: Sitting) 86 98.2 °F (36.8 °C) (Oral) 20 5' 7\" (1.702 m) 197 lb (89.4 kg) SpO2 BMI Smoking Status 96% 30.85 kg/m2 Never Smoker Vitals History BMI and BSA Data Body Mass Index Body Surface Area  
 30.85 kg/m 2 2.06 m 2 Preferred Pharmacy Pharmacy Name Phone [facility]/PHARMACY #[id number]- [address], [id number] [place] [address]  [phone] Your Updated Medication List  
  
   
This list is accurate as of 3/26/18  2:04 PM.  Always use your most recent med list.  
  
  
  
  
 apixaban 5 mg tablet Commonly known as:  Augustina Grad Take 1 Tab by mouth two (2) times a day. Indications: PREVENT THROMBOEMBOLISM IN CHRONIC ATRIAL FIBRILLATION  
  
 ascorbic acid (vitamin C) 500 mg tablet Commonly known as:  VITAMIN C Take 1,000 mg by mouth daily. aspirin 81 mg chewable tablet Take 1 Tab by mouth daily. atorvastatin 10 mg tablet Commonly known as:  LIPITOR Take 1 Tab by mouth daily. Indications: hypercholesterolemia * clobetasol 0.05 % external solution Commonly known as:  Joanie Comber Apply  to affected area two (2) times a day. * clobetasol 0.05 % topical cream  
Commonly known as:  Joanie Comber Apply  to affected area two (2) times a day. clotrimazole 1 % topical cream  
Commonly known as:  Calin Muzzy Apply  to affected area two (2) times a day. dilTIAZem 30 mg tablet Commonly known as:  CARDIZEM Take 1 Tab by mouth two (2) times a day. FREESTYLE LANCETS 28 gauge Misc Generic drug:  lancets 1 Package by IntraDERMal route once over twenty-four (24) hours. FREESTYLE LITE STRIPS strip Generic drug:  glucose blood VI test strips 100 Each by Does Not Apply route once over twenty-four (24) hours. Test blood sugar up to 3 times daily   Dx E11.9 HYDROcodone-acetaminophen  mg tablet Commonly known as:  NORCO  
TAKE 1 TABLET BY MOUTH TWICE A DAY AS NEEDED FOR PAIN  
  
 levothyroxine 100 mcg tablet Commonly known as:  SYNTHROID Take 1 Tab by mouth Daily (before breakfast). Indications: hypothyroidism  
  
 metFORMIN 1,000 mg tablet Commonly known as:  GLUCOPHAGE Take 1 Tab by mouth two (2) times daily (with meals). Indications: type 2 diabetes mellitus  
  
 multivitamin tablet Commonly known as:  ONE A DAY Take 1 Tab by mouth daily. omeprazole 20 mg capsule Commonly known as:  PRILOSEC Take 1 Cap by mouth daily. triamcinolone acetonide 0.1 % topical cream  
Commonly known as:  KENALOG  
APPLY TWICE A DAY AS DIRECTED * Notice: This list has 2 medication(s) that are the same as other medications prescribed for you. Read the directions carefully, and ask your doctor or other care provider to review them with you. Prescriptions Printed Refills HYDROcodone-acetaminophen (NORCO)  mg tablet 0 Sig: TAKE 1 TABLET BY MOUTH TWICE A DAY AS NEEDED FOR PAIN Class: Print Patient Instructions Colon Polyps: Care Instructions Your Care Instructions Colon polyps are growths in the colon or the rectum. The cause of most colon polyps is not known, and most people who get them do not have any problems. But a certain kind can turn into cancer. For this reason, regular testing for colon polyps is important for people age 48 and older and anyone who has an increased risk for colon cancer. Polyps are usually found through routine colon cancer screening tests. Although most colon polyps are not cancerous, they are usually removed and then tested for cancer. Screening for colon cancer saves lives because the cancer can usually be cured if it is caught early. If you have a polyp that is the type that can turn into cancer, you may need more tests to examine your entire colon.  The doctor will remove any other polyps that he or she finds, and you will be tested more often. Follow-up care is a key part of your treatment and safety. Be sure to make and go to all appointments, and call your doctor if you are having problems. It's also a good idea to know your test results and keep a list of the medicines you take. How can you care for yourself at home? Regular exams to look for colon polyps are the best way to prevent polyps from turning into colon cancer. These can include stool tests, sigmoidoscopy, colonoscopy, and CT colonography. Talk with your doctor about a testing schedule that is right for you. To prevent polyps There is no home treatment that can prevent colon polyps. But these steps may help lower your risk for cancer. · Stay active. Being active can help you get to and stay at a healthy weight. Try to exercise on most days of the week. Walking is a good choice. · Eat well. Choose a variety of vegetables, fruits, legumes (such as peas and beans), fish, poultry, and whole grains. · Do not smoke. If you need help quitting, talk to your doctor about stop-smoking programs and medicines. These can increase your chances of quitting for good. · If you drink alcohol, limit how much you drink. Limit alcohol to 2 drinks a day for men and 1 drink a day for women. When should you call for help? Call your doctor now or seek immediate medical care if: 
? · You have severe belly pain. ? · Your stools are maroon or very bloody. ? Watch closely for changes in your health, and be sure to contact your doctor if: 
? · You have a fever. ? · You have nausea or vomiting. ? · You have a change in bowel habits (new constipation or diarrhea). ? · Your symptoms get worse or are not improving as expected. Where can you learn more? Go to http://catrina-dorie.info/. Enter 95 [id number] in the search box to learn more about \"Colon Polyps: Care Instructions. \" Current as of: May 12, 2017 Content Version: 11.4 © 9700-3434 Healthwise, [employer]click. Care instructions adapted under license by [employer] (which disclaims liability or warranty for this information). If you have questions about a medical condition or this instruction, always ask your healthcare professional. [address] any warranty or liability for your use of this information. Introducing [facility] & HEALTH SERVICES! Dear Laury Koyanagi: Thank you for requesting a [employer] account. Our records indicate that you already have an active [employer] account. You can access your account anytime at [email or web address] Did you know that you can access your hospital and ER discharge instructions at any time in [employer] medic? You can also review all of your test results from your hospital stay or ER visit. Additional Information If you have questions, please visit the Frequently Asked Questions section of the [employer] website at [email or web address] Remember, [employer] is NOT to be used for urgent needs. For medical emergencies, dial 911. Now available from your iPhone and Android! Please provide this summary of care documentation to your next provider. Your primary care clinician is listed as Urban Pump. If you have any questions after today's visit, please call [phone].

## 2018-03-26 NOTE — PROGRESS NOTES
Identified pt with two pt identifiers(name and ). Chief Complaint   Patient presents with    Colon Cancer Screening     colonoscopy friday    Medication Refill        There are no preventive care reminders to display for this patient. Wt Readings from Last 3 Encounters:   18 197 lb (89.4 kg)   18 198 lb 12.8 oz (90.2 kg)   18 200 lb 3.2 oz (90.8 kg)     Temp Readings from Last 3 Encounters:   18 98.2 °F (36.8 °C) (Oral)   18 98 °F (36.7 °C) (Oral)   18 97.3 °F (36.3 °C) (Oral)     BP Readings from Last 3 Encounters:   18 128/60   18 128/60   18 110/60     Pulse Readings from Last 3 Encounters:   18 86   18 86   18 76         Learning Assessment:  :     Learning Assessment 2016   PRIMARY LEARNER Patient Patient   HIGHEST LEVEL OF EDUCATION - PRIMARY LEARNER  2 YEARS OF COLLEGE -   BARRIERS PRIMARY LEARNER NONE -   CO-LEARNER CAREGIVER No -   PRIMARY LANGUAGE ENGLISH ENGLISH   LEARNER PREFERENCE PRIMARY LISTENING DEMONSTRATION   ANSWERED BY self Patient   RELATIONSHIP SELF SELF       Depression Screening:  :     PHQ over the last two weeks 2018   Little interest or pleasure in doing things Not at all   Feeling down, depressed or hopeless Not at all   Total Score PHQ 2 0       Fall Risk Assessment:  :     Fall Risk Assessment, last 12 mths 2018   Able to walk? Yes   Fall in past 12 months? No       Abuse Screening:  :     Abuse Screening Questionnaire 2018   Do you ever feel afraid of your partner? N N   Are you in a relationship with someone who physically or mentally threatens you? N N   Is it safe for you to go home?  Y Y       Coordination of Care Questionnaire:  :     1) Have you been to an emergency room, urgent care clinic since your last visit? no   Hospitalized since your last visit? no             2) Have you seen or consulted any other health care providers outside of [address] [place] [address] since your last visit? no  (Include any pap smears or colon screenings in this section.)    3) Do you have an Advance Directive on file? yes  Are you interested in receiving information about Advance Directives? no    Reviewed record in preparation for visit and have obtained necessary documentation. Medication reconciliation up to date and corrected with patient at this time.

## 2018-03-26 NOTE — PATIENT INSTRUCTIONS
Colon Polyps: Care Instructions  Your Care Instructions    Colon polyps are growths in the colon or the rectum. The cause of most colon polyps is not known, and most people who get them do not have any problems. But a certain kind can turn into cancer. For this reason, regular testing for colon polyps is important for people age 48 and older and anyone who has an increased risk for colon cancer. Polyps are usually found through routine colon cancer screening tests. Although most colon polyps are not cancerous, they are usually removed and then tested for cancer. Screening for colon cancer saves lives because the cancer can usually be cured if it is caught early. If you have a polyp that is the type that can turn into cancer, you may need more tests to examine your entire colon. The doctor will remove any other polyps that he or she finds, and you will be tested more often. Follow-up care is a key part of your treatment and safety. Be sure to make and go to all appointments, and call your doctor if you are having problems. It's also a good idea to know your test results and keep a list of the medicines you take. How can you care for yourself at home? Regular exams to look for colon polyps are the best way to prevent polyps from turning into colon cancer. These can include stool tests, sigmoidoscopy, colonoscopy, and CT colonography. Talk with your doctor about a testing schedule that is right for you. To prevent polyps  There is no home treatment that can prevent colon polyps. But these steps may help lower your risk for cancer. · Stay active. Being active can help you get to and stay at a healthy weight. Try to exercise on most days of the week. Walking is a good choice. · Eat well. Choose a variety of vegetables, fruits, legumes (such as peas and beans), fish, poultry, and whole grains. · Do not smoke. If you need help quitting, talk to your doctor about stop-smoking programs and medicines.  These can increase your chances of quitting for good. · If you drink alcohol, limit how much you drink. Limit alcohol to 2 drinks a day for men and 1 drink a day for women. When should you call for help? Call your doctor now or seek immediate medical care if:  ? · You have severe belly pain. ? · Your stools are maroon or very bloody. ? Watch closely for changes in your health, and be sure to contact your doctor if:  ? · You have a fever. ? · You have nausea or vomiting. ? · You have a change in bowel habits (new constipation or diarrhea). ? · Your symptoms get worse or are not improving as expected. Where can you learn more? Go to http://catrina-dorie.info/. Enter 95 [id number] in the search box to learn more about \"Colon Polyps: Care Instructions. \"  Current as of: May 12, 2017  Content Version: 11.4  © 9179-4532 Healthwise, [employer]. Care instructions adapted under license by [employer] (which disclaims liability or warranty for this information). If you have questions about a medical condition or this instruction, always ask your healthcare professional. [place] [address] any warranty or liability for your use of this information.

## 2018-04-16 ENCOUNTER — OFFICE VISIT (OUTPATIENT)
Dept: FAMILY MEDICINE CLINIC | Age: 73
End: 2018-04-16

## 2018-04-16 VITALS
SYSTOLIC BLOOD PRESSURE: 144 MMHG | RESPIRATION RATE: 20 BRPM | WEIGHT: 196.2 LBS | HEART RATE: 75 BPM | OXYGEN SATURATION: 96 % | DIASTOLIC BLOOD PRESSURE: 76 MMHG | HEIGHT: 67 IN | BODY MASS INDEX: 30.79 KG/M2 | TEMPERATURE: 97.9 F

## 2018-04-16 DIAGNOSIS — R00.0 RAPID HEART BEAT: ICD-10-CM

## 2018-04-16 DIAGNOSIS — Z79.899 ENCOUNTER FOR LONG-TERM CURRENT USE OF MEDICATION: ICD-10-CM

## 2018-04-16 DIAGNOSIS — Z13.21 ENCOUNTER FOR VITAMIN DEFICIENCY SCREENING: ICD-10-CM

## 2018-04-16 DIAGNOSIS — E03.9 ACQUIRED HYPOTHYROIDISM: Chronic | ICD-10-CM

## 2018-04-16 DIAGNOSIS — E11.9 CONTROLLED TYPE 2 DIABETES MELLITUS WITHOUT COMPLICATION, WITHOUT LONG-TERM CURRENT USE OF INSULIN (HCC): Chronic | ICD-10-CM

## 2018-04-16 DIAGNOSIS — I48.91 ATRIAL FIBRILLATION WITH RVR (HCC): ICD-10-CM

## 2018-04-16 DIAGNOSIS — E78.2 MIXED HYPERLIPIDEMIA: Primary | Chronic | ICD-10-CM

## 2018-04-16 NOTE — PROGRESS NOTES
Identified pt with two pt identifiers(name and ). Chief Complaint   Patient presents with    Follow-up     3 month follow up and fasting labs        There are no preventive care reminders to display for this patient. Wt Readings from Last 3 Encounters:   18 197 lb (89.4 kg)   18 198 lb 12.8 oz (90.2 kg)   18 200 lb 3.2 oz (90.8 kg)     Temp Readings from Last 3 Encounters:   18 98.2 °F (36.8 °C) (Oral)   18 98 °F (36.7 °C) (Oral)   18 97.3 °F (36.3 °C) (Oral)     BP Readings from Last 3 Encounters:   18 128/60   18 128/60   18 110/60     Pulse Readings from Last 3 Encounters:   18 86   18 86   18 76         Learning Assessment:  :     Learning Assessment 2016   PRIMARY LEARNER Patient Patient   HIGHEST LEVEL OF EDUCATION - PRIMARY LEARNER  2 YEARS OF COLLEGE -   BARRIERS PRIMARY LEARNER NONE -   CO-LEARNER CAREGIVER No -   PRIMARY LANGUAGE ENGLISH ENGLISH   LEARNER PREFERENCE PRIMARY LISTENING DEMONSTRATION   ANSWERED BY self Patient   RELATIONSHIP SELF SELF       Depression Screening:  :     PHQ over the last two weeks 2018   Little interest or pleasure in doing things Not at all   Feeling down, depressed or hopeless Not at all   Total Score PHQ 2 0       Fall Risk Assessment:  :     Fall Risk Assessment, last 12 mths 2018   Able to walk? Yes   Fall in past 12 months? No       Abuse Screening:  :     Abuse Screening Questionnaire 2018   Do you ever feel afraid of your partner? N N   Are you in a relationship with someone who physically or mentally threatens you? N N   Is it safe for you to go home?  Y Y       Coordination of Care Questionnaire:  :     1) Have you been to an emergency room, urgent care clinic since your last visit? no   Hospitalized since your last visit? no             2) Have you seen or consulted any other health care providers outside of [address] [place] [address] since your last visit? no  (Include any pap smears or colon screenings in this section.)    3) Do you have an Advance Directive on file? yes  Are you interested in receiving information about Advance Directives? no.    Reviewed record in preparation for visit and have obtained necessary documentation. Medication reconciliation up to date and corrected with patient at this time.

## 2018-04-16 NOTE — PROGRESS NOTES
Subjective: Odilia Au is a 67 y.o. male who is seen for follow up of hyperlipidemia and hypothyroidism. Cardiovascular risk analysis - 67 y.o. male LDL goal is under 100. Compliance with treatment thus far has been excellent. ROS: taking medications as instructed, no medication side effects noted, no TIA's, no chest pain on exertion, no dyspnea on exertion, no swelling of ankles. New concerns: he C/O rapid heart beat with exertion. He has not seen Cardiology since 2016 after having AF. Had negative nuclear stress test 2016. He is taking Diltiazem only once a day, instead of twice daily as prescribed. Patient Active Problem List    Diagnosis Date Noted    Atrial fibrillation with RVR ([facility] 01/06/2017    Atopic dermatitis 12/15/2016    Actinic keratosis 11/15/2016    Skin cancer of nose 04/18/2016    Spondylosis of lumbar joint 03/14/2016    Polyposis coli 02/19/2016    Chronic back pain 01/14/2016    Acquired hypothyroidism 01/14/2016    GERD (gastroesophageal reflux disease) 01/14/2016    Diabetes mellitus type 2, [facility] ([facility] 01/14/2016    Obese 12/21/2011    Mixed hyperlipidemia 10/02/2010     Current Outpatient Prescriptions   Medication Sig Dispense Refill    HYDROcodone-acetaminophen (NORCO)  mg tablet TAKE 1 TABLET BY MOUTH TWICE A DAY AS NEEDED FOR PAIN 60 Tab 0    triamcinolone acetonide (KENALOG) 0.1 % topical cream APPLY TWICE A DAY AS DIRECTED  5    clobetasol (TEMOVATE) 0.05 % external solution Apply  to affected area two (2) times a day.  multivitamin (ONE A DAY) tablet Take 1 Tab by mouth daily.  ascorbic acid, vitamin C, (VITAMIN C) 500 mg tablet Take 1,000 mg by mouth daily.  clobetasol (TEMOVATE) 0.05 % topical cream Apply  to affected area two (2) times a day. 60 Tube 3    clotrimazole (LOTRIMIN) 1 % topical cream Apply  to affected area two (2) times a day.  15 g 0    apixaban (ELIQUIS) 5 mg tablet Take 1 Tab by mouth two (2) times a day. Indications: PREVENT THROMBOEMBOLISM IN CHRONIC ATRIAL FIBRILLATION 180 Tab 3    levothyroxine (SYNTHROID) 100 mcg tablet Take 1 Tab by mouth Daily (before breakfast). Indications: hypothyroidism 90 Tab 3    atorvastatin (LIPITOR) 10 mg tablet Take 1 Tab by mouth daily. Indications: hypercholesterolemia 90 Tab 3    omeprazole (PRILOSEC) 20 mg capsule Take 1 Cap by mouth daily. 90 Cap 3    metFORMIN (GLUCOPHAGE) 1,000 mg tablet Take 1 Tab by mouth two (2) times daily (with meals). Indications: type 2 diabetes mellitus 180 Tab 3    dilTIAZem (CARDIZEM) 30 mg tablet Take 1 Tab by mouth two (2) times a day. 180 Tab 3    aspirin 81 mg chewable tablet Take 1 Tab by mouth daily. 30 Tab 0    FREESTYLE LITE STRIPS strip 100 Each by Does Not Apply route once over twenty-four (24) hours. Test blood sugar up to 3 times daily   Dx E11.9 100 Strip 5    FREESTYLE LANCETS 28 gauge misc 1 Package by IntraDERMal route once over twenty-four (24) hours.   4     Allergies   Allergen Reactions    Ambien [Zolpidem] Other (comments)     Sleep walking  Doing things like cooking but he was not awake     Past Medical History:   Diagnosis Date    Acid reflux     Cardiac dysrhythmia, unspecified 12/21/2011    Diabetes ([address]     GERD (gastroesophageal reflux disease)     Headache(784.0) 12/21/2011    Hypercholesterolemia     Hypothyroid     Obesity     PUD (peptic ulcer disease)     TIA (transient ischemic attack)     2014     Past Surgical History:   Procedure Laterality Date    HX COLONOSCOPY  03/23/2018    HX GASTRECTOMY      HX HEENT      left retroorbital tumor resection    HX MOHS PROCEDURES Right     right shoulder    HX TUMOR REMOVAL  2015    Left Eye     Family History   Problem Relation Age of Onset    Diabetes Sister     Cancer Sister      lung    Diabetes Mother     Cancer Father      brain    Diabetes Maternal Grandmother     Hypertension Neg Hx      Social History   Substance Use Topics    Smoking status: Never Smoker    Smokeless tobacco: Never Used    Alcohol use No           Objective:     Visit Vitals    /76 (BP 1 Location: Left arm, BP Patient Position: Sitting)  Comment: Manual    Pulse 75    Temp 97.9 °F (36.6 °C) (Oral)    Resp 20    Ht 5' 7\" (1.702 m)    Wt 196 lb 3.2 oz (89 kg)    SpO2 96%    BMI 30.73 kg/m2      Appearance: alert, well appearing, and in no distress. CVS exam BP noted to be well controlled today in office, S1, S2 normal, no gallop, no murmur, chest clear, no JVD, no HSM, no edema. Lab review: orders written for new lab studies as appropriate; see orders. Assessment/Plan:   Hyperlipidemia . orders and follow up as documented in patient record. ICD-10-CM ICD-9-CM    1. Mixed hyperlipidemia E78.2 272.2 LIPID PANEL   2. Acquired hypothyroidism E03.9 244.9 TSH 3RD GENERATION      T4, FREE   3. Encounter for long-term current use of medication R99.239 S46.62 METABOLIC PANEL, COMPREHENSIVE   4. Controlled type 2 diabetes mellitus without complication, without long-term current use of insulin ([facility]) E11.9 250.00 HEMOGLOBIN A1C WITH EAG   5. Encounter for vitamin deficiency screening Z13.21 V77.99 VITAMIN B12   6. Rapid heart beat R00.0 785.0 REFERRAL TO CARDIOLOGY   7. Atrial fibrillation with RVR ([facility]) I48.91 427.31 REFERRAL TO CARDIOLOGY   . Labs drawn. Recommend Co Q 10 supplement with statin. Refer to Cardiology.

## 2018-04-16 NOTE — MR AVS SNAPSHOT
[address] [place] [address] [address] [facility] 
[phone] Patient: Marquita Blackwood MRN: [id number] :1945 Visit Information Date & Time Provider Department Dept. Phone Encounter #  
   5:56 AM Avery Martins [phone] [id number] Upcoming Health Maintenance Date Due HEMOGLOBIN A1C Q6M 2018 EYE EXAM RETINAL OR DILATED Q1 2019 FOOT EXAM Q1 2019 MICROALBUMIN Q1 2019 LIPID PANEL Q1 2019 MEDICARE YEARLY EXAM 2019 FOBT Q 1 YEAR AGE 50-75 3/1/2019 DTaP/Tdap/Td series (2 - Td) 2020 GLAUCOMA SCREENING Q2Y 2020 Allergies as of 2018  Review Complete On: 3/50/7162 By: Inge Blanco MD  
  
 Severity Noted Reaction Type Reactions Ambien [Zolpidem] High 2016   Side Effect Other (comments) Sleep walking Doing things like cooking but he was not awake Current Immunizations  Reviewed on 2018 Name Date Influenza High Dose Vaccine PF 10/17/2017, 2016 10:51 AM  
 Influenza Vaccine 2015, 10/2/2013 Pneumococcal Conjugate (PCV-13) 1/15/2018 Pneumococcal Polysaccharide (PPSV-23) 2014 TB Skin Test (PPD) 2009 Tdap 2010 Zoster Vaccine, Live 3/15/2016 Not reviewed this visit You Were Diagnosed With   
  
 Codes Comments Mixed hyperlipidemia    -  Primary ICD-10-CM: X83.9 ICD-9-CM: 272.2 Acquired hypothyroidism     ICD-10-CM: E03.9 ICD-9-CM: 244.9 Encounter for long-term current use of medication     ICD-10-CM: Z79.899 ICD-9-CM: V58.69 Controlled type 2 diabetes mellitus without complication, without long-term current use of insulin ([facility]     ICD-10-CM: E11.9 ICD-9-CM: 250.00 Encounter for vitamin deficiency screening     ICD-10-CM: Z13.21 ICD-9-CM: V77.99 Rapid heart beat     ICD-10-CM: R00.0 ICD-9-CM: 785.0 Atrial fibrillation with RVR ([facility])     ICD-10-CM: I48.91 
ICD-9-CM: 427.31 Vitals BP Pulse Temp Resp Height(growth percentile) Weight(growth percentile) 144/76 (BP 1 Location: Left arm, BP Patient Position: Sitting) 75 97.9 °F (36.6 °C) (Oral) 20 5' 7\" (1.702 m) 196 lb 3.2 oz (89 kg) SpO2 BMI Smoking Status 96% 30.73 kg/m2 Never Smoker BMI and BSA Data Body Mass Index Body Surface Area 30.73 kg/m 2 2.05 m 2 Preferred Pharmacy Pharmacy Name Phone [id number] [address] [facility], [address] [place] [address]  [phone] Your Updated Medication List  
  
   
This list is accurate as of 4/16/18  9:30 AM.  Always use your most recent med list.  
  
  
  
  
 apixaban 5 mg tablet Commonly known as:  Joby Sayer Take 1 Tab by mouth two (2) times a day. Indications: PREVENT THROMBOEMBOLISM IN CHRONIC ATRIAL FIBRILLATION  
  
 ascorbic acid (vitamin C) 500 mg tablet Commonly known as:  VITAMIN C Take 1,000 mg by mouth daily. aspirin 81 mg chewable tablet Take 1 Tab by mouth daily. atorvastatin 10 mg tablet Commonly known as:  LIPITOR Take 1 Tab by mouth daily. Indications: hypercholesterolemia * clobetasol 0.05 % external solution Commonly known as:  Charlie Foil Apply  to affected area two (2) times a day. * clobetasol 0.05 % topical cream  
Commonly known as:  Charlie Foil Apply  to affected area two (2) times a day. clotrimazole 1 % topical cream  
Commonly known as:  Deronda Hint Apply  to affected area two (2) times a day. dilTIAZem 30 mg tablet Commonly known as:  CARDIZEM Take 1 Tab by mouth two (2) times a day. FREESTYLE LANCETS 28 gauge Misc Generic drug:  lancets 1 Package by IntraDERMal route once over twenty-four (24) hours. FREESTYLE LITE STRIPS strip Generic drug:  glucose blood VI test strips 100 Each by Does Not Apply route once over twenty-four (24) hours.  Test blood sugar up to 3 times daily   Dx E11.9 HYDROcodone-acetaminophen  mg tablet Commonly known as:  NORCO  
TAKE 1 TABLET BY MOUTH TWICE A DAY AS NEEDED FOR PAIN  
  
 levothyroxine 100 mcg tablet Commonly known as:  SYNTHROID Take 1 Tab by mouth Daily (before breakfast). Indications: hypothyroidism  
  
 metFORMIN 1,000 mg tablet Commonly known as:  GLUCOPHAGE Take 1 Tab by mouth two (2) times daily (with meals). Indications: type 2 diabetes mellitus  
  
 multivitamin tablet Commonly known as:  ONE A DAY Take 1 Tab by mouth daily. omeprazole 20 mg capsule Commonly known as:  PRILOSEC Take 1 Cap by mouth daily. triamcinolone acetonide 0.1 % topical cream  
Commonly known as:  KENALOG  
APPLY TWICE A DAY AS DIRECTED * Notice: This list has 2 medication(s) that are the same as other medications prescribed for you. Read the directions carefully, and ask your doctor or other care provider to review them with you. We Performed the Following HEMOGLOBIN A1C WITH EAG [59034 CPT(R)] LIPID PANEL [56880 CPT(R)] METABOLIC PANEL, COMPREHENSIVE [25879 CPT(R)] REFERRAL TO CARDIOLOGY [WVC45 Custom] T4, FREE [id number] CPT(R)] TSH 3RD GENERATION [44091 CPT(R)] VITAMIN B12 [id number] CPT(R)] Referral Information Referral ID Referred By Referred To  
  
 [id number] Michelle Thibodeaux MD   
   [address] [place] [address] Phone: [phone] Fax: [phone] Visits Status Start Date End Date 1 New Request 4/16/18 4/16/19 If your referral has a status of pending review or denied, additional information will be sent to support the outcome of this decision. Introducing [facility] & HEALTH SERVICES! Dear Didier Burroughs: Thank you for requesting a [employer] account. Our records indicate that you already have an active [employer] account.   You can access your account anytime at [email or web address] Did you know that you can access your hospital and ER discharge instructions at any time in [employer]? You can also review all of your test results from your hospital stay or ER visit. Additional Information If you have questions, please visit the Frequently Asked Questions section of the [employer] website at [email or web address] Remember, [employer] is NOT to be used for urgent needs. For medical emergencies, dial 911. Now available from your iPhone and Android! Please provide this summary of care documentation to your next provider. Your primary care clinician is listed as Queta Lemus. If you have any questions after today's visit, please call [phone].

## 2018-04-17 LAB
ALBUMIN SERPL-MCNC: 4.2 G/DL (ref 3.5–4.8)
ALBUMIN/GLOB SERPL: 1.8 {RATIO} (ref 1.2–2.2)
ALP SERPL-CCNC: 108 IU/L (ref 39–117)
ALT SERPL-CCNC: 39 IU/L (ref 0–44)
AST SERPL-CCNC: 38 IU/L (ref 0–40)
BILIRUB SERPL-MCNC: 0.4 MG/DL (ref 0–1.2)
BUN SERPL-MCNC: 16 MG/DL (ref 8–27)
BUN/CREAT SERPL: 19 (ref 10–24)
CALCIUM SERPL-MCNC: 9 MG/DL (ref 8.6–10.2)
CHLORIDE SERPL-SCNC: 103 MMOL/L (ref 96–106)
CHOLEST SERPL-MCNC: 171 MG/DL (ref 100–199)
CO2 SERPL-SCNC: 20 MMOL/L (ref 18–29)
CREAT SERPL-MCNC: 0.85 MG/DL (ref 0.76–1.27)
EST. AVERAGE GLUCOSE BLD GHB EST-MCNC: 151 MG/DL
GFR SERPLBLD CREATININE-BSD FMLA CKD-EPI: 100 ML/MIN/1.73
GFR SERPLBLD CREATININE-BSD FMLA CKD-EPI: 86 ML/MIN/1.73
GLOBULIN SER CALC-MCNC: 2.4 G/DL (ref 1.5–4.5)
GLUCOSE SERPL-MCNC: 106 MG/DL (ref 65–99)
HBA1C MFR BLD: 6.9 % (ref 4.8–5.6)
HDLC SERPL-MCNC: 51 MG/DL
INTERPRETATION, 910389: NORMAL
LDLC SERPL CALC-MCNC: 83 MG/DL (ref 0–99)
Lab: NORMAL
POTASSIUM SERPL-SCNC: 4.9 MMOL/L (ref 3.5–5.2)
PROT SERPL-MCNC: 6.6 G/DL (ref 6–8.5)
SODIUM SERPL-SCNC: 142 MMOL/L (ref 134–144)
T4 FREE SERPL-MCNC: 1.17 NG/DL (ref 0.82–1.77)
TRIGL SERPL-MCNC: 184 MG/DL (ref 0–149)
TSH SERPL DL<=0.005 MIU/L-ACNC: 2.72 UIU/ML (ref 0.45–4.5)
VIT B12 SERPL-MCNC: 463 PG/ML (ref 232–1245)
VLDLC SERPL CALC-MCNC: 37 MG/DL (ref 5–40)

## 2018-04-17 NOTE — PROGRESS NOTES
Labs show good diabetes control (A1C). Improved cholesterol numbers with taking statin! Normal thyroid level. Normal Vit B 12 level. Continue current medicines.

## 2018-04-20 ENCOUNTER — OFFICE VISIT (OUTPATIENT)
Dept: CARDIOLOGY CLINIC | Age: 73
End: 2018-04-20

## 2018-04-20 VITALS
HEIGHT: 67 IN | DIASTOLIC BLOOD PRESSURE: 66 MMHG | RESPIRATION RATE: 20 BRPM | SYSTOLIC BLOOD PRESSURE: 130 MMHG | OXYGEN SATURATION: 98 % | HEART RATE: 78 BPM | BODY MASS INDEX: 31.39 KG/M2 | WEIGHT: 200 LBS

## 2018-04-20 DIAGNOSIS — Z79.01 CHRONIC ANTICOAGULATION: ICD-10-CM

## 2018-04-20 DIAGNOSIS — E78.5 DYSLIPIDEMIA: ICD-10-CM

## 2018-04-20 DIAGNOSIS — I48.0 PAF (PAROXYSMAL ATRIAL FIBRILLATION) (HCC): Primary | ICD-10-CM

## 2018-04-20 DIAGNOSIS — R06.00 DYSPNEA, UNSPECIFIED TYPE: ICD-10-CM

## 2018-04-20 RX ORDER — GLUCOSAM/CHONDRO/HERB 149/HYAL 750-100 MG
1 TABLET ORAL DAILY
COMMUNITY

## 2018-04-20 RX ORDER — ROSUVASTATIN CALCIUM 10 MG/1
10 TABLET, COATED ORAL
Status: ON HOLD | COMMUNITY
End: 2021-05-03

## 2018-04-20 RX ORDER — CHOLECALCIFEROL (VITAMIN D3) 125 MCG
1 CAPSULE ORAL DAILY
COMMUNITY

## 2018-04-20 NOTE — MR AVS SNAPSHOT
[address] [facility] 600 [address] [facility] 
[phone] Patient: Indu Gilliland MRN: [id number] :1945 Visit Information Date & Time Provider Department Dept. Phone Encounter #  
 2018  2:20 PM Piyush Shi MD [facility] [place] [phone] [id number] Your Appointments 2018  3:00 PM  
ESTABLISHED PATIENT with Piyush Shi MD  
[facility] ([address]) Appt Note: dx afib per dr Ryland Leone; dx afib per dr Ryland Leone r.s from   
 [address] [facility] 600 1007 [facility]  
[address] [facility] [address] [address] Upcoming Health Maintenance Date Due HEMOGLOBIN A1C Q6M 10/16/2018 EYE EXAM RETINAL OR DILATED Q1 2019 FOOT EXAM Q1 2019 MICROALBUMIN Q1 2019 MEDICARE YEARLY EXAM 2019 FOBT Q 1 YEAR AGE 50-75 3/1/2019 LIPID PANEL Q1 2019 DTaP/Tdap/Td series (2 - Td) 2020 GLAUCOMA SCREENING Q2Y 2020 Allergies as of 2018  Review Complete On: 2018 By: Pham Mueller LPN Severity Noted Reaction Type Reactions Ambien [Zolpidem] High 2016   Side Effect Other (comments) Sleep walking Doing things like cooking but he was not awake Current Immunizations  Reviewed on 2018 Name Date Influenza High Dose Vaccine PF 10/17/2017, 2016 10:51 AM  
 Influenza Vaccine 2015, 10/2/2013 Pneumococcal Conjugate (PCV-13) 1/15/2018 Pneumococcal Polysaccharide (PPSV-23) 2014 TB Skin Test (PPD) 2009 Tdap 2010 Zoster Vaccine, Live 3/15/2016 Not reviewed this visit You Were Diagnosed With   
  
 Codes Comments PAF (paroxysmal atrial fibrillation) ([facility]    -  Primary ICD-10-CM: I48.0 ICD-9-CM: 427.31 Dyslipidemia     ICD-10-CM: E78.5 ICD-9-CM: 272.4 Chronic anticoagulation     ICD-10-CM: Z79.01 
ICD-9-CM: V58.61 Dyspnea, unspecified type     ICD-10-CM: R06.00 
ICD-9-CM: 786.09 Vitals BP Pulse Resp Height(growth percentile) Weight(growth percentile) SpO2  
 130/66 (BP 1 Location: Left arm, BP Patient Position: Sitting) 78 20 5' 7\" (1.702 m) 200 lb (90.7 kg) 98% BMI Smoking Status 31.32 kg/m2 Never Smoker Vitals History BMI and BSA Data Body Mass Index Body Surface Area  
 31.32 kg/m 2 2.07 m 2 Preferred Pharmacy Pharmacy Name Phone [id number] [address] [facility], [address] [place] [address]  [phone] Your Updated Medication List  
  
   
This list is accurate as of 4/20/18  2:52 PM.  Always use your most recent med list.  
  
  
  
  
 apixaban 5 mg tablet Commonly known as:  Sid Corrente Take 1 Tab by mouth two (2) times a day. Indications: PREVENT THROMBOEMBOLISM IN CHRONIC ATRIAL FIBRILLATION  
  
 ascorbic acid (vitamin C) 500 mg tablet Commonly known as:  VITAMIN C Take 1,000 mg by mouth daily. aspirin 81 mg chewable tablet Take 1 Tab by mouth daily. atorvastatin 10 mg tablet Commonly known as:  LIPITOR Take 1 Tab by mouth daily. Indications: hypercholesterolemia * clobetasol 0.05 % external solution Commonly known as:  Aida Cocking Apply  to affected area two (2) times a day. * clobetasol 0.05 % topical cream  
Commonly known as:  Aida Cocking Apply  to affected area two (2) times a day. clotrimazole 1 % topical cream  
Commonly known as:  Reino Piano Apply  to affected area two (2) times a day. CRESTOR 10 mg tablet Generic drug:  rosuvastatin Take 10 mg by mouth nightly. dilTIAZem 30 mg tablet Commonly known as:  CARDIZEM Take 1 Tab by mouth two (2) times a day. FREESTYLE LANCETS 28 gauge Misc Generic drug:  lancets 1 Package by IntraDERMal route once over twenty-four (24) hours. FREESTYLE LITE STRIPS strip Generic drug:  glucose blood VI test strips 100 Each by Does Not Apply route once over twenty-four (24) hours. Test blood sugar up to 3 times daily   Dx E11.9 HYDROcodone-acetaminophen  mg tablet Commonly known as:  NORCO  
TAKE 1 TABLET BY MOUTH TWICE A DAY AS NEEDED FOR PAIN  
  
 levothyroxine 100 mcg tablet Commonly known as:  SYNTHROID Take 1 Tab by mouth Daily (before breakfast). Indications: hypothyroidism  
  
 metFORMIN 1,000 mg tablet Commonly known as:  GLUCOPHAGE Take 1 Tab by mouth two (2) times daily (with meals). Indications: type 2 diabetes mellitus  
  
 multivitamin tablet Commonly known as:  ONE A DAY Take 1 Tab by mouth daily. omega 3-DHA-EPA-fish oil 1,000 mg (120 mg-180 mg) capsule Take 1 Cap by mouth daily. omeprazole 20 mg capsule Commonly known as:  PRILOSEC Take 1 Cap by mouth daily. triamcinolone acetonide 0.1 % topical cream  
Commonly known as:  KENALOG  
APPLY TWICE A DAY AS DIRECTED  
  
 VITAMIN D3 2,000 unit Tab Generic drug:  cholecalciferol (vitamin D3) Take  by mouth. * Notice: This list has 2 medication(s) that are the same as other medications prescribed for you. Read the directions carefully, and ask your doctor or other care provider to review them with you. Introducing [facility] & HEALTH SERVICES! Dear Cj Chavez: Thank you for requesting a [employer] account. Our records indicate that you already have an active [employer] account. You can access your account anytime at [email or web address] Did you know that you can access your hospital and ER discharge instructions at any time in [employer]? You can also review all of your test results from your hospital stay or ER visit. Additional Information If you have questions, please visit the Frequently Asked Questions section of the [employer] website at [email or web address] Remember, MyChart is NOT to be used for urgent needs. For medical emergencies, dial 911. Now available from your iPhone and Android! Please provide this summary of care documentation to your next provider. Your primary care clinician is listed as Matt Coyle. If you have any questions after today's visit, please call [phone].

## 2018-04-20 NOTE — PROGRESS NOTES
Cesia Shipman MD    [address] [facility] [place], [address] [facility]    Office [phone],NLA [phone]  Pager [phone]    Beverly Ojeda is a 67 y.o. male is here for f/u visit for Afib    Primary care physician:  Fiona Moore MD    Patient Active Problem List   Diagnosis Code    Mixed hyperlipidemia E78.2    Obese E66.9    Chronic back pain M54.9, G89.29    Acquired hypothyroidism E03.9    GERD (gastroesophageal reflux disease) K21.9    Diabetes mellitus type 2, controlled ([address] E11.9    Polyposis coli D12.6    Spondylosis of lumbar joint M47.816    Skin cancer of nose C44.301    Actinic keratosis L57.0    Atopic dermatitis L20.9    Atrial fibrillation with RVR ([address] I48.91       Chief complaint:  Chief Complaint   Patient presents with    Irregular Heart Beat     episode of dizziness  last week while working in the yard    Chest Pain     exertional  constant xs 4-5 days     Other     Discuss diltiazem       Dear Dr. Malathi Casillas,    I had the pleasure of seeing  Mr Beverly Ojeda  in the office today. Assessment:  PAF - 1/7/17Afib with RVR - converted to SR with cardizem gtt  DM  HLD  Hx of TIA       Plan:   Patient still is on Cardizem 30 mg but he is taking it only once a day. He will increase it to twice daily and also monitor his blood pressures. If he continues to have palpitations and if his systolic blood pressures greater than 100 he can take it 3 times daily. He has had a normal stress nuclear study January 2017. If his symptoms persist on twice daily/daily dose of Cardizem, we will schedule the stress test.  Continue Eliquis. Aggressive cardiovascular risk for modification. Follow-up in 2 months. Patient understands the plan. All questions were answered to the patient's satisfaction.     Medication Side Effects and Warnings were discussed with patient: yes  Patient Labs were reviewed and or requested:  yes  Patient Past Records were reviewed and or requested: yes    I appreciate the opportunity to be involved in [address] See note below for details. Please do not hesitate to contact us with questions or concerns. Ramila Mistry MD    Cardiac Testing/ Procedures: A. Cardiac Cath/PCI:    B.ECHO/JEANNETTE: 1/6/17 Left ventricle: Systolic function was normal. Ejection fraction was  estimated in the range of 55 % to 60 %. There were no regional wall motion  abnormalities. Aortic valve: There was mild regurgitation. C.StressNuclear/Stress ECHO/Stress test: 1/2017 - Ex nuc 6.01min /Nml/EF 59%    D.Vascular:    E. EP:    F. Miscellaneous:    Subjective: José Miguel Quinonez is a 67 y.o. male who returns for follow up visit. Off late he has noticed palpitations associated with mild dyspnea uncertain location when he exerts himself. He is actively pursuing karate and does not have significant issues doing his daily routine. However, he has been taking Cardizem 30 mg only once a day. No swelling lower extremities. Also complains of excessive burping. Patient participates in the Karate and understands the risks of being on anticoagulation . ROS:  (bold if positive, if negative)             Medications before admission:    Current Outpatient Prescriptions   Medication Sig Dispense    rosuvastatin (CRESTOR) 10 mg tablet Take 10 mg by mouth nightly.  cholecalciferol, vitamin D3, (VITAMIN D3) 2,000 unit tab Take  by mouth.  omega 3-DHA-EPA-fish oil 1,000 mg (120 mg-180 mg) capsule Take 1 Cap by mouth daily.  clobetasol (TEMOVATE) 0.05 % topical cream Apply  to affected area two (2) times a day. 60 Tube    clotrimazole (LOTRIMIN) 1 % topical cream Apply  to affected area two (2) times a day. 15 g    apixaban (ELIQUIS) 5 mg tablet Take 1 Tab by mouth two (2) times a day.  Indications: PREVENT THROMBOEMBOLISM IN CHRONIC ATRIAL FIBRILLATION 180 Tab    levothyroxine (SYNTHROID) 100 mcg tablet Take 1 Tab by mouth Daily (before breakfast). Indications: hypothyroidism 90 Tab    atorvastatin (LIPITOR) 10 mg tablet Take 1 Tab by mouth daily. Indications: hypercholesterolemia 90 Tab    omeprazole (PRILOSEC) 20 mg capsule Take 1 Cap by mouth daily. 90 Cap    metFORMIN (GLUCOPHAGE) 1,000 mg tablet Take 1 Tab by mouth two (2) times daily (with meals). Indications: type 2 diabetes mellitus 180 Tab    dilTIAZem (CARDIZEM) 30 mg tablet Take 1 Tab by mouth two (2) times a day. 180 Tab    aspirin 81 mg chewable tablet Take 1 Tab by mouth daily. 30 Tab    HYDROcodone-acetaminophen (NORCO)  mg tablet TAKE 1 TABLET BY MOUTH TWICE A DAY AS NEEDED FOR PAIN 60 Tab    triamcinolone acetonide (KENALOG) 0.1 % topical cream APPLY TWICE A DAY AS DIRECTED     clobetasol (TEMOVATE) 0.05 % external solution Apply  to affected area two (2) times a day.  multivitamin (ONE A DAY) tablet Take 1 Tab by mouth daily.  ascorbic acid, vitamin C, (VITAMIN C) 500 mg tablet Take 1,000 mg by mouth daily.  FREESTYLE LITE STRIPS strip 100 Each by Does Not Apply route once over twenty-four (24) hours. Test blood sugar up to 3 times daily   Dx E11.9 100 Strip    FREESTYLE LANCETS 28 gauge misc 1 Package by IntraDERMal route once over twenty-four (24) hours. No current facility-administered medications for this visit. Physical Exam:  Visit Vitals    /66 (BP 1 Location: Left arm, BP Patient Position: Sitting)    Pulse 78    Resp 20    Ht 5' 7\" (1.702 m)    Wt 200 lb (90.7 kg)    SpO2 98%    BMI 31.32 kg/m2          Gen: Well-developed, well-nourished, in no acute distress  Neck: Supple,No JVD, No Carotid Bruit,   Resp: No accessory muscle use, Clear breath sounds, No rales or rhonchi  Card: Regular Rate,Rythm,Normal S1, S2, No murmurs, rubs or gallop. No thrills.    Abd:  Soft, non-tender, non-distended,BS+,   MSK: No cyanosis  Skin: No rashes    Neuro: moving all four extremities , follows commands appropriately  Psych:  Good insight, oriented to person, place , alert, Nml Affect  LE: No edema    EKG: Sinus rhythm, normal axis, normal intervals      LABS:        Lab Results   Component Value Date/Time    WBC 6.5 01/12/2018 12:03 PM    HGB 13.2 01/12/2018 12:03 PM    HCT 44.3 01/12/2018 12:03 PM    PLATELET 000 (L) 05/02/1958 12:03 PM    MCV 71 (L) 01/12/2018 12:03 PM     Lab Results   Component Value Date/Time    Sodium 142 04/16/2018 09:34 AM    Potassium 4.9 04/16/2018 09:34 AM    Chloride 103 04/16/2018 09:34 AM    CO2 20 04/16/2018 09:34 AM    Anion gap 9 01/07/2017 05:24 AM    Glucose 106 (H) 04/16/2018 09:34 AM    BUN 16 04/16/2018 09:34 AM    Creatinine 0.85 04/16/2018 09:34 AM    BUN/Creatinine ratio 19 04/16/2018 09:34 AM    GFR est  04/16/2018 09:34 AM    GFR est non-AA 86 04/16/2018 09:34 AM    Calcium 9.0 04/16/2018 09:34 AM       Lab Results   Component Value Date/Time    aPTT 25.3 01/06/2017 05:25 PM     Lab Results   Component Value Date/Time    INR 1.0 01/06/2017 05:25 PM    INR 1.0 04/04/2012 06:05 PM    Prothrombin time 10.1 01/06/2017 05:25 PM    Prothrombin time 10.1 04/04/2012 06:05 PM     No components found for: Raudel Shrestha MD

## 2018-04-25 ENCOUNTER — OFFICE VISIT (OUTPATIENT)
Dept: FAMILY MEDICINE CLINIC | Age: 73
End: 2018-04-25

## 2018-04-25 VITALS
DIASTOLIC BLOOD PRESSURE: 62 MMHG | OXYGEN SATURATION: 95 % | HEIGHT: 67 IN | TEMPERATURE: 98.2 F | SYSTOLIC BLOOD PRESSURE: 112 MMHG | HEART RATE: 70 BPM | RESPIRATION RATE: 20 BRPM | BODY MASS INDEX: 31.61 KG/M2 | WEIGHT: 201.4 LBS

## 2018-04-25 DIAGNOSIS — I48.91 ATRIAL FIBRILLATION WITH RVR (HCC): Primary | ICD-10-CM

## 2018-04-25 DIAGNOSIS — M47.896 OTHER OSTEOARTHRITIS OF SPINE, LUMBAR REGION: Chronic | ICD-10-CM

## 2018-04-25 RX ORDER — HYDROCODONE BITARTRATE AND ACETAMINOPHEN 10; 325 MG/1; MG/1
TABLET ORAL
Qty: 60 TAB | Refills: 0 | Status: SHIPPED | OUTPATIENT
Start: 2018-04-25 | End: 2018-05-25 | Stop reason: SDUPTHER

## 2018-04-25 NOTE — MR AVS SNAPSHOT
[address] [facility] 
 
 
 [address] [facility] 
[phone] Patient: Beverly Ojeda MRN: [id number] :1945 Visit Information Date & Time Provider Department Dept. Phone Encounter #  
  02:74 AM Fiona Moore [facility] [phone] [id number] Your Appointments 2018  2:00 PM  
ESTABLISHED PATIENT with Cesia Shipman MD  
[facility] ([facility]) Appt Note: 2 mo fup  
 [address] [facility] [address] [facility]  
54 [facility] [address] [facility] [address] Upcoming Health Maintenance Date Due HEMOGLOBIN A1C Q6M 10/16/2018 EYE EXAM RETINAL OR DILATED Q1 2019 FOOT EXAM Q1 2019 MICROALBUMIN Q1 2019 MEDICARE YEARLY EXAM 2019 FOBT Q 1 YEAR AGE 50-75 3/1/2019 LIPID PANEL Q1 2019 DTaP/Tdap/Td series (2 - Td) 2020 GLAUCOMA SCREENING Q2Y 2020 Allergies as of 2018  Review Complete On: 9223 By: Fiona Moore MD  
  
 Severity Noted Reaction Type Reactions Ambien [Zolpidem] High 2016   Side Effect Other (comments) Sleep walking Doing things like cooking but he was not awake Current Immunizations  Reviewed on 2018 Name Date Influenza High Dose Vaccine PF 10/17/2017, 2016 10:51 AM  
 Influenza Vaccine 2015, 10/2/2013 Pneumococcal Conjugate (PCV-13) 1/15/2018 Pneumococcal Polysaccharide (PPSV-23) 2014 TB Skin Test (PPD) 2009 Tdap 2010 Zoster Vaccine, Live 3/15/2016 Not reviewed this visit You Were Diagnosed With   
  
 Codes Comments Atrial fibrillation with RVR ([facility]    -  Primary ICD-10-CM: I48.91 
ICD-9-CM: 427.31 Other osteoarthritis of spine, lumbar region     ICD-10-CM: M47.896 ICD-9-CM: 721.3 Vitals BP Pulse Temp Resp Height(growth percentile) Weight(growth percentile) 112/62 (BP 1 Location: Left arm, BP Patient Position: Sitting) 70 98.2 °F (36.8 °C) (Oral) 20 5' 7\" (1.702 m) 201 lb 6.4 oz (91.4 kg) SpO2 BMI Smoking Status 95% 31.54 kg/m2 Never Smoker Vitals History BMI and BSA Data Body Mass Index Body Surface Area 31.54 kg/m 2 2.08 m 2 Preferred Pharmacy Pharmacy Name Phone [id number] [address] [facility], [id number] [place] [address]  [phone] Your Updated Medication List  
  
   
This list is accurate as of 4/25/18 12:36 PM.  Always use your most recent med list.  
  
  
  
  
 apixaban 5 mg tablet Commonly known as:  Ele [place] Take 1 Tab by mouth two (2) times a day. Indications: PREVENT THROMBOEMBOLISM IN CHRONIC ATRIAL FIBRILLATION  
  
 ascorbic acid (vitamin C) 500 mg tablet Commonly known as:  VITAMIN C Take 1,000 mg by mouth daily. aspirin 81 mg chewable tablet Take 1 Tab by mouth daily. atorvastatin 10 mg tablet Commonly known as:  LIPITOR Take 1 Tab by mouth daily. Indications: hypercholesterolemia * clobetasol 0.05 % external solution Commonly known as:  Starling Coffee Apply  to affected area two (2) times a day. * clobetasol 0.05 % topical cream  
Commonly known as:  Starling Coffee Apply  to affected area two (2) times a day. clotrimazole 1 % topical cream  
Commonly known as:  [place] Apply  to affected area two (2) times a day. CRESTOR 10 mg tablet Generic drug:  rosuvastatin Take 10 mg by mouth nightly. dilTIAZem 30 mg tablet Commonly known as:  CARDIZEM Take 1 Tab by mouth two (2) times a day. FREESTYLE LANCETS 28 gauge Misc Generic drug:  lancets 1 Package by IntraDERMal route once over twenty-four (24) hours. FREESTYLE LITE STRIPS strip Generic drug:  glucose blood VI test strips 100 Each by Does Not Apply route once over twenty-four (24) hours.  Test blood sugar up to 3 times daily   Dx E11.9 HYDROcodone-acetaminophen  mg tablet Commonly known as:  NORCO  
TAKE 1 TABLET BY MOUTH TWICE A DAY AS NEEDED FOR PAIN  
  
 levothyroxine 100 mcg tablet Commonly known as:  SYNTHROID Take 1 Tab by mouth Daily (before breakfast). Indications: hypothyroidism  
  
 metFORMIN 1,000 mg tablet Commonly known as:  GLUCOPHAGE Take 1 Tab by mouth two (2) times daily (with meals). Indications: type 2 diabetes mellitus  
  
 multivitamin tablet Commonly known as:  ONE A DAY Take 1 Tab by mouth daily. omega 3-DHA-EPA-fish oil 1,000 mg (120 mg-180 mg) capsule Take 1 Cap by mouth daily. omeprazole 20 mg capsule Commonly known as:  PRILOSEC Take 1 Cap by mouth daily. triamcinolone acetonide 0.1 % topical cream  
Commonly known as:  KENALOG  
APPLY TWICE A DAY AS DIRECTED  
  
 VITAMIN D3 2,000 unit Tab Generic drug:  cholecalciferol (vitamin D3) Take  by mouth. * Notice: This list has 2 medication(s) that are the same as other medications prescribed for you. Read the directions carefully, and ask your doctor or other care provider to review them with you. Prescriptions Printed Refills HYDROcodone-acetaminophen (NORCO)  mg tablet 0 Sig: TAKE 1 TABLET BY MOUTH TWICE A DAY AS NEEDED FOR PAIN Class: Print Introducing [facility]! Dear Bernadette Manner: Thank you for requesting a "[employer] account. Our records indicate that you already have an active "[employer] account. You can access your account anytime at [email or web address] Did you know that you can access your hospital and ER discharge instructions at any time in "[employer]? You can also review all of your test results from your hospital stay or ER visit. Additional Information If you have questions, please visit the Frequently Asked Questions section of the "[employer] website at [email or web address] Remember, [employer] is NOT to be used for urgent needs. For medical emergencies, dial 911. Now available from your iPhone and Android! Please provide this summary of care documentation to your next provider. Your primary care clinician is listed as Nilam Faustin. If you have any questions after today's visit, please call [phone].

## 2018-04-25 NOTE — PROGRESS NOTES
Identified pt with two pt identifiers(name and ). Chief Complaint   Patient presents with    Medication Refill    Results     ? EKG        There are no preventive care reminders to display for this patient. Wt Readings from Last 3 Encounters:   18 201 lb 6.4 oz (91.4 kg)   18 200 lb (90.7 kg)   18 196 lb 3.2 oz (89 kg)     Temp Readings from Last 3 Encounters:   18 98.2 °F (36.8 °C) (Oral)   18 97.9 °F (36.6 °C) (Oral)   18 98.2 °F (36.8 °C) (Oral)     BP Readings from Last 3 Encounters:   18 112/62   18 130/66   18 144/76     Pulse Readings from Last 3 Encounters:   18 70   18 78   18 75         Learning Assessment:  :     Learning Assessment 2016   PRIMARY LEARNER Patient Patient   HIGHEST LEVEL OF EDUCATION - PRIMARY LEARNER  2 YEARS OF COLLEGE -   BARRIERS PRIMARY LEARNER NONE -   CO-LEARNER CAREGIVER No -   PRIMARY LANGUAGE ENGLISH ENGLISH   LEARNER PREFERENCE PRIMARY LISTENING DEMONSTRATION   ANSWERED BY self Patient   RELATIONSHIP SELF SELF       Depression Screening:  :     PHQ over the last two weeks 2018   Little interest or pleasure in doing things Not at all   Feeling down, depressed or hopeless Not at all   Total Score PHQ 2 0       Fall Risk Assessment:  :     Fall Risk Assessment, last 12 mths 2018   Able to walk? Yes   Fall in past 12 months? No       Abuse Screening:  :     Abuse Screening Questionnaire 2018   Do you ever feel afraid of your partner? N N   Are you in a relationship with someone who physically or mentally threatens you? N N   Is it safe for you to go home? Y Y       Coordination of Care Questionnaire:  :     1) Have you been to an emergency room, urgent care clinic since your last visit? no   Hospitalized since your last visit? no             2) Have you seen or consulted any other health care providers outside of [address] [place] [address] since your last visit?  yes Dr El Jones (Include any pap smears or colon screenings in this section.)    3) Do you have an Advance Directive on file? yes  Are you interested in receiving information about Advance Directives? no    Reviewed record in preparation for visit and have obtained necessary documentation. Medication reconciliation up to date and corrected with patient at this time.

## 2018-04-26 NOTE — PROGRESS NOTES
HISTORY OF PRESENT ILLNESS  Michelle Cervantes is a 67 y.o. male. HPI  FU tachycardia. He is feeling better since increase dose of Diltiazem and seeing Cardiology. He also needs refill of pain med for chronic back pain. Pt is active and able to do martial arts classes, no contact. ROS  Visit Vitals    /62 (BP 1 Location: Left arm, BP Patient Position: Sitting)  Comment: manual    Pulse 70    Temp 98.2 °F (36.8 °C) (Oral)    Resp 20    Ht 5' 7\" (1.702 m)    Wt 201 lb 6.4 oz (91.4 kg)    SpO2 95%    BMI 31.54 kg/m2       Physical Exam   Constitutional: He appears well-developed and well-nourished. Cardiovascular: Normal rate and normal heart sounds. Vitals reviewed. ASSESSMENT and PLAN    ICD-10-CM ICD-9-CM    1. Atrial fibrillation with RVR ([facility]) I48.91 427.31    2. Other osteoarthritis of spine, lumbar region M47.896 721.3 HYDROcodone-acetaminophen (NORCO)  mg tablet     improved rate control of AF. Med refill.

## 2018-06-13 ENCOUNTER — TELEPHONE (OUTPATIENT)
Dept: CARDIOLOGY CLINIC | Age: 73
End: 2018-06-13

## 2018-06-13 NOTE — TELEPHONE ENCOUNTER
Two pt identifiers used. Confirmed apt for 1pm for 6/14/18 Nuclear stress test. Informed pt no caffiene after 11pm on 6/13/18, NPO after 11am on 6/14/18. Encouraged pt to wear comfortable clothes. Tests last 1.5-2 hours. Pt verbalized understanding.

## 2018-06-14 ENCOUNTER — CLINICAL SUPPORT (OUTPATIENT)
Dept: CARDIOLOGY CLINIC | Age: 73
End: 2018-06-14

## 2018-06-14 DIAGNOSIS — E11.9 CONTROLLED TYPE 2 DIABETES MELLITUS WITHOUT COMPLICATION, WITHOUT LONG-TERM CURRENT USE OF INSULIN (HCC): Chronic | ICD-10-CM

## 2018-06-14 DIAGNOSIS — I48.91 ATRIAL FIBRILLATION WITH RVR (HCC): ICD-10-CM

## 2018-06-14 DIAGNOSIS — R07.89 CHEST DISCOMFORT: Primary | ICD-10-CM

## 2018-06-14 DIAGNOSIS — E78.2 MIXED HYPERLIPIDEMIA: Chronic | ICD-10-CM

## 2018-06-14 NOTE — PROGRESS NOTES
Explained procedure to patient, Obtaining IV access, radiation exposure, risks and discomforts (for exercise stress test), waiting between injections and obtaining images. All concerns and questions addressed, prior to obtaining consent. See scanned report. Dr. Ladi Samayoa ordered and Dr. Ladi Samayoa read study. ID verified per protocol. Pt  reported no symptoms at completion of protocol.

## 2018-06-19 ENCOUNTER — OFFICE VISIT (OUTPATIENT)
Dept: FAMILY MEDICINE CLINIC | Age: 73
End: 2018-06-19

## 2018-06-19 VITALS
TEMPERATURE: 98.6 F | RESPIRATION RATE: 18 BRPM | HEART RATE: 58 BPM | DIASTOLIC BLOOD PRESSURE: 65 MMHG | SYSTOLIC BLOOD PRESSURE: 138 MMHG | BODY MASS INDEX: 31.58 KG/M2 | OXYGEN SATURATION: 97 % | HEIGHT: 67 IN | WEIGHT: 201.2 LBS

## 2018-06-19 DIAGNOSIS — T14.8XXA ABRASION: Primary | ICD-10-CM

## 2018-06-19 RX ORDER — SULFAMETHOXAZOLE AND TRIMETHOPRIM 800; 160 MG/1; MG/1
1 TABLET ORAL 2 TIMES DAILY
Qty: 20 TAB | Refills: 0 | Status: SHIPPED | OUTPATIENT
Start: 2018-06-19 | End: 2018-06-29

## 2018-06-19 RX ORDER — MUPIROCIN 20 MG/G
OINTMENT TOPICAL 2 TIMES DAILY
Qty: 22 G | Refills: 0 | Status: SHIPPED | OUTPATIENT
Start: 2018-06-19 | End: 2018-07-09 | Stop reason: ALTCHOICE

## 2018-06-19 NOTE — PATIENT INSTRUCTIONS
Scrapes (Abrasions): Care Instructions  Your Care Instructions  Scrapes (abrasions) are wounds where your skin has been rubbed or torn off. Most scrapes do not go deep into the skin, but some may remove several layers of skin. Scrapes usually don't bleed much, but they may ooze pinkish fluid. Scrapes on the head or face may appear worse than they are. They may bleed a lot because of the good blood supply to this area. Most scrapes heal well and may not need a bandage. They usually heal within 3 to 7 days. A large, deep scrape may take 1 to 2 weeks or longer to heal. A scab may form on some scrapes. Follow-up care is a key part of your treatment and safety. Be sure to make and go to all appointments, and call your doctor if you are having problems. It's also a good idea to know your test results and keep a list of the medicines you take. How can you care for yourself at home? · If your doctor told you how to care for your wound, follow your doctor's instructions. If you did not get instructions, follow this general advice:  ¨ Wash the scrape with clean water 2 times a day. Don't use hydrogen peroxide or alcohol, which can slow healing. ¨ You may cover the scrape with a thin layer of petroleum jelly, such as Vaseline, and a nonstick bandage. ¨ Apply more petroleum jelly and replace the bandage as needed. · Prop up the injured area on a pillow anytime you sit or lie down during the next 3 days. Try to keep it above the level of your heart. This will help reduce swelling. · Be safe with medicines. Take pain medicines exactly as directed. ¨ If the doctor gave you a prescription medicine for pain, take it as prescribed. ¨ If you are not taking a prescription pain medicine, ask your doctor if you can take an over-the-counter medicine. When should you call for help?   Call your doctor now or seek immediate medical care if:  ? · You have signs of infection, such as:  ¨ Increased pain, swelling, warmth, or redness around the scrape. ¨ Red streaks leading from the scrape. ¨ Pus draining from the scrape. ¨ A fever. ? · The scrape starts to bleed, and blood soaks through the bandage. Oozing small amounts of blood is normal.   ? Watch closely for changes in your health, and be sure to contact your doctor if the scrape is not getting better each day. Where can you learn more? Go to http://catrina-dorie.info/. Enter A374 in the search box to learn more about \"Scrapes (Abrasions): Care Instructions. \"  Current as of: March 20, 2017  Content Version: 11.4  © 8304-8990 [employer]. Care instructions adapted under license by [employer] (which disclaims liability or warranty for this information). If you have questions about a medical condition or this instruction, always ask your healthcare professional. [place] [address] any warranty or liability for your use of this information.

## 2018-06-19 NOTE — PROGRESS NOTES
HISTORY OF PRESENT ILLNESS  Sunil Conway is a 67 y.o. male. HPI  Pt presents with \"laceration\"    Pt states that 5 days ago, patient stepped off the curb wrong, and scraped his knees. His left knee has healed well, but his right knee appears to be infected. It is bleeding a little bit at times, and has yellow tinged discharge noted in the abrasion. He has been putting neosporin on the area, without improvement. No fever  Review of Systems   Constitutional: Negative for fever. HENT: Negative for congestion. Respiratory: Negative for cough. Gastrointestinal: Negative for diarrhea and vomiting. Physical Exam   Constitutional: He is oriented to person, place, and time. He appears well-developed and well-nourished. HENT:   Head: Normocephalic and atraumatic. Cardiovascular: Normal rate, regular rhythm and normal heart sounds. Pulmonary/Chest: Effort normal and breath sounds normal.   Neurological: He is alert and oriented to person, place, and time. Skin: Skin is warm and dry. Psychiatric: He has a normal mood and affect. His behavior is normal.       ASSESSMENT and PLAN    ICD-10-CM ICD-9-CM    1. Abrasion T14. 8XXA 919.0 trimethoprim-sulfamethoxazole (BACTRIM DS, SEPTRA DS) 160-800 mg per tablet      mupirocin (BACTROBAN) 2 % ointment     Informed patient that I have sent medication to the pharmacy, and he should take as prescribed. Educated about keeping area clean and dry, and applying ointment as prescribed. Should return to office with any signs and/or symptoms of infection. Pt informed to return to office with worsening of symptoms, or PRN with any questions or concerns. Pt verbalizes understanding of plan of care and denies further questions or concerns at this time.

## 2018-06-19 NOTE — MR AVS SNAPSHOT
[address] [facility] [address]  
[phone] Patient: Casie Mckinley MRN: [id number] :1945 Visit Information Date & Time Provider Department Dept. Phone Encounter #  
 2018 10:45 AM Araceli Glynn  [facility] [phone] [id number] Follow-up Instructions Return if symptoms worsen or fail to improve. Your Appointments 2018 11:00 AM  
ROUTINE CARE with Luci Riley MD  
[address] [facility]) Appt Note: a1c check and pains in shoulders [facility] [address] [facility] 860 1067 [facility] 13 [address] [place] [address] Upcoming Health Maintenance Date Due Influenza Age 5 to Adult 2018 HEMOGLOBIN A1C Q6M 10/16/2018 EYE EXAM RETINAL OR DILATED Q1 2019 FOOT EXAM Q1 2019 MICROALBUMIN Q1 2019 FOBT Q 1 YEAR AGE 50-75 3/1/2019 LIPID PANEL Q1 2019 DTaP/Tdap/Td series (2 - Td) 2020 GLAUCOMA SCREENING Q2Y 2020 Allergies as of 2018  Review Complete On: 2018 By: Araceli Glynn NP Severity Noted Reaction Type Reactions Ambien [Zolpidem] High 2016   Side Effect Other (comments) Sleep walking Doing things like cooking but he was not awake Current Immunizations  Reviewed on 2018 Name Date Influenza High Dose Vaccine PF 10/17/2017, 2016 10:51 AM  
 Influenza Vaccine 2015, 10/2/2013 Pneumococcal Conjugate (PCV-13) 1/15/2018 Pneumococcal Polysaccharide (PPSV-23) 2014 TB Skin Test (PPD) 2009 Tdap 2010 Zoster Vaccine, Live 3/15/2016 Not reviewed this visit You Were Diagnosed With   
  
 Codes Comments Abrasion    -  Primary ICD-10-CM: T14. Rosales Kaylyn ICD-9-CM: 919.0 Vitals BP Pulse Temp Resp Height(growth percentile) Weight(growth percentile) 138/65 (BP 1 Location: Right arm, BP Patient Position: Sitting) (!) 58 98.6 °F (37 °C) (Oral) 18 5' 7\" (1.702 m) 201 lb 3.2 oz (91.3 kg) SpO2 BMI Smoking Status 97% 31.51 kg/m2 Never Smoker BMI and BSA Data Body Mass Index Body Surface Area  
 31.51 kg/m 2 2.08 m 2 Preferred Pharmacy Pharmacy Name Phone [phone] Your Updated Medication List  
  
   
This list is accurate as of 6/19/18 10:57 AM.  Always use your most recent med list.  
  
  
  
  
 apixaban 5 mg tablet Commonly known as:  Margarie Eder Take 1 Tab by mouth two (2) times a day. Indications: PREVENT THROMBOEMBOLISM IN CHRONIC ATRIAL FIBRILLATION  
  
 ascorbic acid (vitamin C) 500 mg tablet Commonly known as:  VITAMIN C Take 1,000 mg by mouth daily. aspirin 81 mg chewable tablet Take 1 Tab by mouth daily. atorvastatin 10 mg tablet Commonly known as:  LIPITOR Take 1 Tab by mouth daily. Indications: hypercholesterolemia * clobetasol 0.05 % external solution Commonly known as:  Mono Roof Apply  to affected area two (2) times a day. * clobetasol 0.05 % topical cream  
Commonly known as:  Mono Roof Apply  to affected area two (2) times a day. clotrimazole 1 % topical cream  
Commonly known as:  Paul Fears Apply  to affected area two (2) times a day. CRESTOR 10 mg tablet Generic drug:  rosuvastatin Take 10 mg by mouth nightly. dilTIAZem 30 mg tablet Commonly known as:  CARDIZEM Take 1 Tab by mouth two (2) times a day. FREESTYLE LANCETS 28 gauge Misc Generic drug:  lancets 1 Package by IntraDERMal route once over twenty-four (24) hours. FREESTYLE LITE STRIPS strip Generic drug:  glucose blood VI test strips 100 Each by Does Not Apply route once over twenty-four (24) hours. Test blood sugar up to 3 times daily   Dx E11.9 HYDROcodone-acetaminophen  mg tablet Commonly known as:  NORCO  
TAKE 1 TABLET BY MOUTH TWICE A DAY AS NEEDED FOR PAIN  
  
 levothyroxine 100 mcg tablet Commonly known as:  SYNTHROID Take 1 Tab by mouth Daily (before breakfast). Indications: hypothyroidism  
  
 metFORMIN 1,000 mg tablet Commonly known as:  GLUCOPHAGE Take 1 Tab by mouth two (2) times daily (with meals). Indications: type 2 diabetes mellitus  
  
 multivitamin tablet Commonly known as:  ONE A DAY Take 1 Tab by mouth daily. mupirocin 2 % ointment Commonly known as:  [facility] Apply  to affected area two (2) times a day. X 1 week. omega 3-DHA-EPA-fish oil 1,000 mg (120 mg-180 mg) capsule Take 1 Cap by mouth daily. omeprazole 20 mg capsule Commonly known as:  PRILOSEC Take 1 Cap by mouth daily. triamcinolone acetonide 0.1 % topical cream  
Commonly known as:  KENALOG  
APPLY TWICE A DAY AS DIRECTED  
  
 trimethoprim-sulfamethoxazole 160-800 mg per tablet Commonly known as:  BACTRIM DS, SEPTRA DS Take 1 Tab by mouth two (2) times a day for 10 days. VITAMIN D3 2,000 unit Tab Generic drug:  cholecalciferol (vitamin D3) Take  by mouth. * Notice: This list has 2 medication(s) that are the same as other medications prescribed for you. Read the directions carefully, and ask your doctor or other care provider to review them with you. Prescriptions Sent to Pharmacy Refills  
 trimethoprim-sulfamethoxazole (BACTRIM DS, SEPTRA DS) 160-800 mg per tablet 0 Sig: Take 1 Tab by mouth two (2) times a day for 10 days. Class: Normal  
 Pharmacy: [facility]/pharmacy [address], [address] [place] [address] Ph #: [phone] Route: Oral  
 mupirocin (BACTROBAN) 2 % ointment 0 Sig: Apply  to affected area two (2) times a day. X 1 week. Class: Normal  
 Pharmacy: [facility]/pharmacy [address], [address] [place] [address] Ph #: [phone]  Route: Topical  
  
 Follow-up Instructions Return if symptoms worsen or fail to improve. Patient Instructions Scrapes (Abrasions): Care Instructions Your Care Instructions Scrapes (abrasions) are wounds where your skin has been rubbed or torn off. Most scrapes do not go deep into the skin, but some may remove several layers of skin. Scrapes usually don't bleed much, but they may ooze pinkish fluid. Scrapes on the head or face may appear worse than they are. They may bleed a lot because of the good blood supply to this area. Most scrapes heal well and may not need a bandage. They usually heal within 3 to 7 days. A large, deep scrape may take 1 to 2 weeks or longer to heal. A scab may form on some scrapes. Follow-up care is a key part of your treatment and safety. Be sure to make and go to all appointments, and call your doctor if you are having problems. It's also a good idea to know your test results and keep a list of the medicines you take. How can you care for yourself at home? · If your doctor told you how to care for your wound, follow your doctor's instructions. If you did not get instructions, follow this general advice: ¨ Wash the scrape with clean water 2 times a day. Don't use hydrogen peroxide or alcohol, which can slow healing. ¨ You may cover the scrape with a thin layer of petroleum jelly, such as Vaseline, and a nonstick bandage. ¨ Apply more petroleum jelly and replace the bandage as needed. · Prop up the injured area on a pillow anytime you sit or lie down during the next 3 days. Try to keep it above the level of your heart. This will help reduce swelling. · Be safe with medicines. Take pain medicines exactly as directed. ¨ If the doctor gave you a prescription medicine for pain, take it as prescribed. ¨ If you are not taking a prescription pain medicine, ask your doctor if you can take an over-the-counter medicine. When should you call for help? Call your doctor now or seek immediate medical care if: 
? · You have signs of infection, such as: 
¨ Increased pain, swelling, warmth, or redness around the scrape. ¨ Red streaks leading from the scrape. ¨ Pus draining from the scrape. ¨ A fever. ? · The scrape starts to bleed, and blood soaks through the bandage. Oozing small amounts of blood is normal. ? Watch closely for changes in your health, and be sure to contact your doctor if the scrape is not getting better each day. Where can you learn more? Go to http://catrina-dorie.info/. Enter A374 in the search box to learn more about \"Scrapes (Abrasions): Care Instructions. \" Current as of: March 20, 2017 Content Version: 11.4 © 0051-7402 [employer]. Care instructions adapted under license by [employer] (which disclaims liability or warranty for this information). If you have questions about a medical condition or this instruction, always ask your healthcare professional. [place] [address] any warranty or liability for your use of this information. Introducing [facility] & HEALTH SERVICES! Dear Didier Burroughs: Thank you for requesting a QRuso account. Our records indicate that you already have an active QRuso account. You can access your account anytime at [email or web address] Did you know that you can access your hospital and ER discharge instructions at any time in QRuso? You can also review all of your test results from your hospital stay or ER visit. Additional Information If you have questions, please visit the Frequently Asked Questions section of the QRuso website at [email or web address] Remember, QRuso is NOT to be used for urgent needs. For medical emergencies, dial 911. Now available from your iPhone and Android! Please provide this summary of care documentation to your next provider. Your primary care clinician is listed as Judge Sánchez. If you have any questions after today's visit, please call [phone].

## 2018-06-20 ENCOUNTER — TELEPHONE (OUTPATIENT)
Dept: CARDIOLOGY CLINIC | Age: 73
End: 2018-06-20

## 2018-06-20 NOTE — TELEPHONE ENCOUNTER
Patient would like to speak with someone regarding his test results.  He said he's still having some pressure on his chest.  Phone: [phone]

## 2018-06-21 NOTE — TELEPHONE ENCOUNTER
Informed pt that recent stress test was negative but states that his previous stress test showed a blockage. C/O chest pressure and wants to know what to do next.

## 2018-06-22 ENCOUNTER — OFFICE VISIT (OUTPATIENT)
Dept: FAMILY MEDICINE CLINIC | Age: 73
End: 2018-06-22

## 2018-06-22 VITALS
RESPIRATION RATE: 20 BRPM | OXYGEN SATURATION: 97 % | BODY MASS INDEX: 30.67 KG/M2 | WEIGHT: 195.4 LBS | HEART RATE: 94 BPM | DIASTOLIC BLOOD PRESSURE: 60 MMHG | HEIGHT: 67 IN | SYSTOLIC BLOOD PRESSURE: 110 MMHG | TEMPERATURE: 98.2 F

## 2018-06-22 DIAGNOSIS — M47.896 OTHER OSTEOARTHRITIS OF SPINE, LUMBAR REGION: Chronic | ICD-10-CM

## 2018-06-22 DIAGNOSIS — S80.211D ABRASION OF RIGHT KNEE, SUBSEQUENT ENCOUNTER: Primary | ICD-10-CM

## 2018-06-22 RX ORDER — HYDROCODONE BITARTRATE AND ACETAMINOPHEN 10; 325 MG/1; MG/1
TABLET ORAL
Qty: 60 TAB | Refills: 0 | Status: SHIPPED | OUTPATIENT
Start: 2018-06-22 | End: 2018-07-25 | Stop reason: SDUPTHER

## 2018-06-22 NOTE — TELEPHONE ENCOUNTER
MD Rosa Tejeda LPN        Caller: Unspecified (2 days ago, 11:04 AM)                     Get him an appt and we will decide whether cath is needed.  Thx            Previous Messages

## 2018-06-22 NOTE — MR AVS SNAPSHOT
[address] [place] [address] [address] [facility] 
[phone] Patient: Pari Hartmann MRN: [id number] :1945 Visit Information Date & Time Provider Department Dept. Phone Encounter #  
   0:98 PM Avery Kim [phone] [id number] Follow-up Instructions Return in about 1 month (around 2018) for fasting labs. Upcoming Health Maintenance Date Due Influenza Age 5 to Adult 2018 HEMOGLOBIN A1C Q6M 10/16/2018 EYE EXAM RETINAL OR DILATED Q1 2019 FOOT EXAM Q1 2019 MICROALBUMIN Q1 2019 MEDICARE YEARLY EXAM 2019 FOBT Q 1 YEAR AGE 50-75 3/1/2019 LIPID PANEL Q1 2019 DTaP/Tdap/Td series (2 - Td) 2020 GLAUCOMA SCREENING Q2Y 2020 Allergies as of 2018  Review Complete On:  By: Matt Coyle MD  
  
 Severity Noted Reaction Type Reactions Ambien [Zolpidem] High 2016   Side Effect Other (comments) Sleep walking Doing things like cooking but he was not awake Current Immunizations  Reviewed on 2018 Name Date Influenza High Dose Vaccine PF 10/17/2017, 2016 10:51 AM  
 Influenza Vaccine 2015, 10/2/2013 Pneumococcal Conjugate (PCV-13) 1/15/2018 Pneumococcal Polysaccharide (PPSV-23) 2014 TB Skin Test (PPD) 2009 Tdap 2010 Zoster Vaccine, Live 3/15/2016 Not reviewed this visit You Were Diagnosed With   
  
 Codes Comments Abrasion of right knee, subsequent encounter    -  Primary ICD-10-CM: V70.399E ICD-9-CM: V58.89 Other osteoarthritis of spine, lumbar region     ICD-10-CM: M47.896 ICD-9-CM: 721.3 Vitals BP Pulse Temp Resp Height(growth percentile) Weight(growth percentile) 110/60 (BP 1 Location: Right arm, BP Patient Position: Sitting) 94 98.2 °F (36.8 °C) (Oral) 20 5' 7\" (1.702 m) 195 lb 6.4 oz (88.6 kg) SpO2 BMI Smoking Status 97% 30.6 kg/m2 Never Smoker BMI and BSA Data Body Mass Index Body Surface Area  
 30.6 kg/m 2 2.05 m 2 Preferred Pharmacy Pharmacy Name Phone [facility]/PHARMACY #[id number] [address] [phone] Your Updated Medication List  
  
   
This list is accurate as of 6/22/18  4:19 PM.  Always use your most recent med list.  
  
  
  
  
 apixaban 5 mg tablet Commonly known as:  Nicolas Vigil Take 1 Tab by mouth two (2) times a day. Indications: PREVENT THROMBOEMBOLISM IN CHRONIC ATRIAL FIBRILLATION  
  
 ascorbic acid (vitamin C) 500 mg tablet Commonly known as:  VITAMIN C Take 1,000 mg by mouth daily. aspirin 81 mg chewable tablet Take 1 Tab by mouth daily. atorvastatin 10 mg tablet Commonly known as:  LIPITOR Take 1 Tab by mouth daily. Indications: hypercholesterolemia * clobetasol 0.05 % external solution Commonly known as:  Lisabeth Puls Apply  to affected area two (2) times a day. * clobetasol 0.05 % topical cream  
Commonly known as:  Lisabeth Puls Apply  to affected area two (2) times a day. clotrimazole 1 % topical cream  
Commonly known as:  Izetta Kapil Apply  to affected area two (2) times a day. CRESTOR 10 mg tablet Generic drug:  rosuvastatin Take 10 mg by mouth nightly. dilTIAZem 30 mg tablet Commonly known as:  CARDIZEM Take 1 Tab by mouth two (2) times a day. FREESTYLE LANCETS 28 gauge Misc Generic drug:  lancets 1 Package by IntraDERMal route once over twenty-four (24) hours. FREESTYLE LITE STRIPS strip Generic drug:  glucose blood VI test strips 100 Each by Does Not Apply route once over twenty-four (24) hours. Test blood sugar up to 3 times daily   Dx E11.9 HYDROcodone-acetaminophen  mg tablet Commonly known as:  NORCO  
TAKE 1 TABLET BY MOUTH TWICE A DAY AS NEEDED FOR PAIN  
  
 levothyroxine 100 mcg tablet Commonly known as:  SYNTHROID Take 1 Tab by mouth Daily (before breakfast). Indications: hypothyroidism  
  
 metFORMIN 1,000 mg tablet Commonly known as:  GLUCOPHAGE Take 1 Tab by mouth two (2) times daily (with meals). Indications: type 2 diabetes mellitus  
  
 multivitamin tablet Commonly known as:  ONE A DAY Take 1 Tab by mouth daily. mupirocin 2 % ointment Commonly known as:  [facility] Apply  to affected area two (2) times a day. X 1 week. omega 3-DHA-EPA-fish oil 1,000 mg (120 mg-180 mg) capsule Take 1 Cap by mouth daily. omeprazole 20 mg capsule Commonly known as:  PRILOSEC Take 1 Cap by mouth daily. triamcinolone acetonide 0.1 % topical cream  
Commonly known as:  KENALOG  
APPLY TWICE A DAY AS DIRECTED  
  
 trimethoprim-sulfamethoxazole 160-800 mg per tablet Commonly known as:  BACTRIM DS, SEPTRA DS Take 1 Tab by mouth two (2) times a day for 10 days. VITAMIN D3 2,000 unit Tab Generic drug:  cholecalciferol (vitamin D3) Take  by mouth. * Notice: This list has 2 medication(s) that are the same as other medications prescribed for you. Read the directions carefully, and ask your doctor or other care provider to review them with you. Prescriptions Printed Refills HYDROcodone-acetaminophen (NORCO)  mg tablet 0 Sig: TAKE 1 TABLET BY MOUTH TWICE A DAY AS NEEDED FOR PAIN Class: Print Follow-up Instructions Return in about 1 month (around 7/22/2018) for fasting labs. Introducing [facility] & HEALTH SERVICES! Dear Dano Draper: Thank you for requesting a Pixel Qi account. Our records indicate that you already have an active Pixel Qi account. You can access your account anytime at [email or web address] Did you know that you can access your hospital and ER discharge instructions at any time in Pixel Qi? You can also review all of your test results from your hospital stay or ER visit. Additional Information If you have questions, please visit the Frequently Asked Questions section of the LetMeHearYat website at [email or web address] Remember, musiXmatch is NOT to be used for urgent needs. For medical emergencies, dial 911. Now available from your iPhone and Android! Please provide this summary of care documentation to your next provider. Your primary care clinician is listed as Afua Melvin. If you have any questions after today's visit, please call [phone].

## 2018-06-22 NOTE — PROGRESS NOTES
Identified pt with two pt identifiers(name and ). Chief Complaint   Patient presents with    Knee Injury     fell [address] in [address] injured right knee - saw Farhana Lee 18        There are no preventive care reminders to display for this patient. Wt Readings from Last 3 Encounters:   18 195 lb 6.4 oz (88.6 kg)   18 201 lb 3.2 oz (91.3 kg)   18 201 lb 6.4 oz (91.4 kg)     Temp Readings from Last 3 Encounters:   18 98.2 °F (36.8 °C) (Oral)   18 98.6 °F (37 °C) (Oral)   18 98.2 °F (36.8 °C) (Oral)     BP Readings from Last 3 Encounters:   18 110/60   18 138/65   18 112/62     Pulse Readings from Last 3 Encounters:   18 94   18 (!) 58   18 70         Learning Assessment:  :     Learning Assessment 2016   PRIMARY LEARNER Patient Patient   HIGHEST LEVEL OF EDUCATION - PRIMARY LEARNER  2 YEARS OF COLLEGE -   BARRIERS PRIMARY LEARNER NONE -   CO-LEARNER CAREGIVER No -   PRIMARY LANGUAGE ENGLISH ENGLISH   LEARNER PREFERENCE PRIMARY LISTENING DEMONSTRATION   ANSWERED BY self Patient   RELATIONSHIP SELF SELF       Depression Screening:  :     PHQ over the last two weeks 2018   Little interest or pleasure in doing things Not at all   Feeling down, depressed or hopeless Not at all   Total Score PHQ 2 0       Fall Risk Assessment:  :     Fall Risk Assessment, last 12 mths 2018   Able to walk? Yes   Fall in past 12 months? No       Abuse Screening:  :     Abuse Screening Questionnaire 2018   Do you ever feel afraid of your partner? N N   Are you in a relationship with someone who physically or mentally threatens you? N N   Is it safe for you to go home?  Y Y       Coordination of Care Questionnaire:  :     1) Have you been to an emergency room, urgent care clinic since your last visit? no   Hospitalized since your last visit? no             2) Have you seen or consulted any other health care providers outside of [address] [place] [address] since your last visit? no  (Include any pap smears or colon screenings in this section.)    3) Do you have an Advance Directive on file? yes  Are you interested in receiving information about Advance Directives? no    Reviewed record in preparation for visit and have obtained necessary documentation. Medication reconciliation up to date and corrected with patient at this time.

## 2018-06-22 NOTE — PROGRESS NOTES
HISTORY OF PRESENT ILLNESS  Isidro Sutherland is a 67 y.o. male. HPI  FU R knee abrasion. He fell 6/16/18. Infected wound and still very tender. Taking Bactrim DS and Mupirocin oint. He also needs med refill for Hydrocodone for chronic back pain. Review of Systems   Musculoskeletal: Positive for back pain, falls and joint pain. All other systems reviewed and are negative. Visit Vitals    /60 (BP 1 Location: Right arm, BP Patient Position: Sitting)  Comment: manual    Pulse 94    Temp 98.2 °F (36.8 °C) (Oral)    Resp 20    Ht 5' 7\" (1.702 m)    Wt 195 lb 6.4 oz (88.6 kg)    SpO2 97%    BMI 30.6 kg/m2       Physical Exam   Constitutional: He appears well-developed and well-nourished. Musculoskeletal:        Right knee: He exhibits erythema. Tenderness found. Legs:  Wound on right knee. Dry eschar on edge. Vitals reviewed. ASSESSMENT and PLAN    ICD-10-CM ICD-9-CM    1. Abrasion of right knee, subsequent encounter S80.211D V58.89    2. Other osteoarthritis of spine, lumbar region M47.896 721.3 HYDROcodone-acetaminophen (NORCO)  mg tablet     Recommend wet to dry dressings. Cont on oral antibiotics and wound dressings. Med refills printed. FU 1 week.

## 2018-06-25 ENCOUNTER — TELEPHONE (OUTPATIENT)
Dept: CARDIOLOGY CLINIC | Age: 73
End: 2018-06-25

## 2018-06-25 NOTE — TELEPHONE ENCOUNTER
Patient said dr Parikh Media told him he cant see the test from 201+6 that dr Vandana Bro did so he wants to know whats going on and how that got lost. Please call. Thank you!    Phone: [phone]

## 2018-06-26 NOTE — TELEPHONE ENCOUNTER
Echo ordered by Yony Mendiola is in [address] -not sure [facility]

## 2018-06-29 ENCOUNTER — OFFICE VISIT (OUTPATIENT)
Dept: CARDIOLOGY CLINIC | Age: 73
End: 2018-06-29

## 2018-06-29 DIAGNOSIS — I48.91 ATRIAL FIBRILLATION WITH RVR (HCC): Primary | ICD-10-CM

## 2018-07-02 LAB — HBA1C MFR BLD HPLC: 6.6 %

## 2018-07-09 ENCOUNTER — OFFICE VISIT (OUTPATIENT)
Dept: FAMILY MEDICINE CLINIC | Age: 73
End: 2018-07-09

## 2018-07-09 VITALS
RESPIRATION RATE: 20 BRPM | DIASTOLIC BLOOD PRESSURE: 60 MMHG | OXYGEN SATURATION: 97 % | BODY MASS INDEX: 30.83 KG/M2 | HEART RATE: 71 BPM | WEIGHT: 196.4 LBS | HEIGHT: 67 IN | SYSTOLIC BLOOD PRESSURE: 132 MMHG | TEMPERATURE: 98.1 F

## 2018-07-09 DIAGNOSIS — R06.09 DYSPNEA ON EXERTION: ICD-10-CM

## 2018-07-09 DIAGNOSIS — K44.9 HIATAL HERNIA: Primary | ICD-10-CM

## 2018-07-09 DIAGNOSIS — D48.5 NEOPLASM OF UNCERTAIN BEHAVIOR OF SKIN OF EYEBROW: ICD-10-CM

## 2018-07-09 NOTE — MR AVS SNAPSHOT
[address] [place] [address] [address] [facility] 
[phone] Patient: Lisa Felix MRN: [id number] :1945 Visit Information Date & Time Provider Department Dept. Phone Encounter #  
 [id number]  7:59 PM Jann Davis [facility] [address] [phone] [id number] Upcoming Health Maintenance Date Due Influenza Age 5 to Adult 2018 HEMOGLOBIN A1C Q6M 10/16/2018 EYE EXAM RETINAL OR DILATED Q1 2019 FOOT EXAM Q1 2019 MICROALBUMIN Q1 2019 FOBT Q 1 YEAR AGE 50-75 3/1/2019 LIPID PANEL Q1 2019 DTaP/Tdap/Td series (2 - Td) 2020 GLAUCOMA SCREENING Q2Y 2020 Allergies as of 2018  Review Complete On:  By: Jann Davis MD  
  
 Severity Noted Reaction Type Reactions Ambien [Zolpidem] High 2016   Side Effect Other (comments) Sleep walking Doing things like cooking but he was not awake Current Immunizations  Reviewed on 2018 Name Date Influenza High Dose Vaccine PF 10/17/2017, 2016 10:51 AM  
 Influenza Vaccine 2015, 10/2/2013 Pneumococcal Conjugate (PCV-13) 1/15/2018 Pneumococcal Polysaccharide (PPSV-23) 2014 TB Skin Test (PPD) 2009 Tdap 2010 Zoster Vaccine, Live 3/15/2016 Not reviewed this visit You Were Diagnosed With   
  
 Codes Comments Hiatal hernia    -  Primary ICD-10-CM: K44.9 ICD-9-CM: 553.3 Neoplasm of uncertain behavior of skin of eyebrow     ICD-10-CM: D48.5 ICD-9-CM: 238.2 Dyspnea on exertion     ICD-10-CM: R06.09 
ICD-9-CM: 786.09 Vitals BP Pulse Temp Resp Height(growth percentile) Weight(growth percentile) 132/60 (BP 1 Location: Left arm, BP Patient Position: Sitting) 71 98.1 °F (36.7 °C) (Oral) 20 5' 7\" (1.702 m) 196 lb 6.4 oz (89.1 kg) SpO2 BMI Smoking Status 97% 30.76 kg/m2 Never Smoker Vitals History BMI and BSA Data Body Mass Index Body Surface Area 30.76 kg/m 2 2.05 m 2 Preferred Pharmacy Pharmacy Name Phone [id number] [address] [phone] Your Updated Medication List  
  
   
This list is accurate as of 7/9/18  1:40 PM.  Always use your most recent med list.  
  
  
  
  
 apixaban 5 mg tablet Commonly known as:  Hyun Mason Take 1 Tab by mouth two (2) times a day. Indications: PREVENT THROMBOEMBOLISM IN CHRONIC ATRIAL FIBRILLATION  
  
 ascorbic acid (vitamin C) 500 mg tablet Commonly known as:  VITAMIN C Take 1,000 mg by mouth daily. aspirin 81 mg chewable tablet Take 1 Tab by mouth daily. atorvastatin 10 mg tablet Commonly known as:  LIPITOR Take 1 Tab by mouth daily. Indications: hypercholesterolemia * clobetasol 0.05 % external solution Commonly known as:  Charmian [place] Apply  to affected area two (2) times a day. * clobetasol 0.05 % topical cream  
Commonly known as:  [place]ian [place] Apply  to affected area two (2) times a day. clotrimazole 1 % topical cream  
Commonly known as:  Mcintyre Singer Apply  to affected area two (2) times a day. CRESTOR 10 mg tablet Generic drug:  rosuvastatin Take 10 mg by mouth nightly. dilTIAZem 30 mg tablet Commonly known as:  CARDIZEM Take 1 Tab by mouth two (2) times a day. HYDROcodone-acetaminophen  mg tablet Commonly known as:  NORCO  
TAKE 1 TABLET BY MOUTH TWICE A DAY AS NEEDED FOR PAIN  
  
 levothyroxine 100 mcg tablet Commonly known as:  SYNTHROID Take 1 Tab by mouth Daily (before breakfast). Indications: hypothyroidism  
  
 metFORMIN 1,000 mg tablet Commonly known as:  GLUCOPHAGE Take 1 Tab by mouth two (2) times daily (with meals). Indications: type 2 diabetes mellitus  
  
 multivitamin tablet Commonly known as:  ONE A DAY Take 1 Tab by mouth daily. omega 3-DHA-EPA-fish oil 1,000 mg (120 mg-180 mg) capsule Take 1 Cap by mouth daily. omeprazole 20 mg capsule Commonly known as:  PRILOSEC Take 1 Cap by mouth daily. triamcinolone acetonide 0.1 % topical cream  
Commonly known as:  KENALOG  
APPLY TWICE A DAY AS DIRECTED  
  
 VITAMIN D3 2,000 unit Tab Generic drug:  cholecalciferol (vitamin D3) Take  by mouth. * Notice: This list has 2 medication(s) that are the same as other medications prescribed for you. Read the directions carefully, and ask your doctor or other care provider to review them with you. We Performed the Following CBC WITH AUTOMATED DIFF [95742 CPT(R)] IRON PROFILE [id number] CPT(R)] REFERRAL TO DERMATOLOGY [REF19 Custom] Comments:  
 Please evaluate patient for skin lesion on left eye lid. REFERRAL TO GASTROENTEROLOGY [UWV46 Custom] Referral Information Referral ID Referred By Referred To  
  
 [id number] [facility] [address] [place], [place] [address] [address] Phone: [phone] Fax: [phone] Visits Status Start Date End Date 1 New Request 7/9/18 7/9/19 If your referral has a status of pending review or denied, additional information will be sent to support the outcome of this decision. Referral ID Referred By Referred To  
 [id number] Josette LEDESMA MD  
   [address] [facility] [address] [facility], [address] Phone: [phone] Fax: [phone] Visits Status Start Date End Date 1 New Request 7/9/18 7/9/19 If your referral has a status of pending review or denied, additional information will be sent to support the outcome of this decision. Patient Instructions Hiatal Hernia: Care Instructions Your Care Instructions A hiatal hernia occurs when part of the stomach bulges into the chest cavity.  
A hiatal hernia may allow stomach acid and juices to back up into the esophagus (acid reflux). This can cause a feeling of burning, warmth, heat, or pain behind the breastbone. This feeling may often occur after you eat, soon after you lie down, or when you bend forward, and it may come and go. You also may have a sour taste in your mouth. These symptoms are commonly known as heartburn or reflux. But not all hiatal hernias cause symptoms. Follow-up care is a key part of your treatment and safety. Be sure to make and go to all appointments, and call your doctor if you are having problems. It's also a good idea to know your test results and keep a list of the medicines you take. How can you care for yourself at home? · Take your medicines exactly as prescribed. Call your doctor if you think you are having a problem with your medicine. · Do not take aspirin or other nonsteroidal anti-inflammatory drugs (NSAIDs), such as ibuprofen (Advil, Motrin) or naproxen (Aleve), unless your doctor says it is okay. Ask your doctor what you can take for pain. · Your doctor may recommend over-the-counter medicine. For mild or occasional indigestion, antacids such as Tums, Gaviscon, Maalox, or Mylanta may help. Your doctor also may recommend over-the-counter acid reducers, such as famotidine (Pepcid AC), cimetidine (Tagamet HB), ranitidine (Zantac 75 and Zantac 150), or omeprazole (Prilosec). Read and follow all instructions on the label. If you use these medicines often, talk with your doctor. · Change your eating habits. ¨ It's best to eat several small meals instead of two or three large meals. ¨ After you eat, wait 2 to 3 hours before you lie down. Late-night snacks aren't a good idea. ¨ Chocolate, mint, and alcohol can make heartburn worse. They relax the valve between the esophagus and the stomach. ¨ Spicy foods, foods that have a lot of acid (like tomatoes and oranges), and coffee can make heartburn symptoms worse in some people.  If your symptoms are worse after you eat a certain food, you may want to stop eating that food to see if your symptoms get better. · Do not smoke or chew tobacco. 
· If you get heartburn at night, raise the head of your bed 6 to 8 inches by putting the frame on blocks or placing a foam wedge under the head of your mattress. (Adding extra pillows does not work.) · Do not wear tight clothing around your middle. · Lose weight if you need to. Losing just 5 to 10 pounds can help. When should you call for help? Call your doctor now or seek immediate medical care if: 
? · You have new or worse belly pain. ? · You are vomiting. ? Watch closely for changes in your health, and be sure to contact your doctor if: 
? · You have new or worse symptoms of indigestion. ? · You have trouble or pain swallowing. ? · You are losing weight. ? · You do not get better as expected. Where can you learn more? Go to http://catrina-dorie.info/. Enter U307 in the search box to learn more about \"Hiatal Hernia: Care Instructions. \" Current as of: May 12, 2017 Content Version: 11.4 © 4453-3942 [employer]. Care instructions adapted under license by [employer] (which disclaims liability or warranty for this information). If you have questions about a medical condition or this instruction, always ask your healthcare professional. [address] any warranty or liability for your use of this information. Introducing hospitals & HEALTH SERVICES! Dear Jo Palmer: Thank you for requesting a [employer] account. Our records indicate that you already have an active [employer] account. You can access your account anytime at [email or web address] Did you know that you can access your hospital and ER discharge instructions at any time in [employer]? You can also review all of your test results from your hospital stay or ER visit. Additional Information If you have questions, please visit the Frequently Asked Questions section of the [employer] website at [email or web address] Remember, [employer] is NOT to be used for urgent needs. For medical emergencies, dial 911. Now available from your iPhone and Android! Please provide this summary of care documentation to your next provider. Your primary care clinician is listed as Mandie Garner. If you have any questions after today's visit, please call [phone].

## 2018-07-09 NOTE — PATIENT INSTRUCTIONS
Hiatal Hernia: Care Instructions  Your Care Instructions  A hiatal hernia occurs when part of the stomach bulges into the chest cavity. A hiatal hernia may allow stomach acid and juices to back up into the esophagus (acid reflux). This can cause a feeling of burning, warmth, heat, or pain behind the breastbone. This feeling may often occur after you eat, soon after you lie down, or when you bend forward, and it may come and go. You also may have a sour taste in your mouth. These symptoms are commonly known as heartburn or reflux. But not all hiatal hernias cause symptoms. Follow-up care is a key part of your treatment and safety. Be sure to make and go to all appointments, and call your doctor if you are having problems. It's also a good idea to know your test results and keep a list of the medicines you take. How can you care for yourself at home? · Take your medicines exactly as prescribed. Call your doctor if you think you are having a problem with your medicine. · Do not take aspirin or other nonsteroidal anti-inflammatory drugs (NSAIDs), such as ibuprofen (Advil, Motrin) or naproxen (Aleve), unless your doctor says it is okay. Ask your doctor what you can take for pain. · Your doctor may recommend over-the-counter medicine. For mild or occasional indigestion, antacids such as Tums, Gaviscon, Maalox, or Mylanta may help. Your doctor also may recommend over-the-counter acid reducers, such as famotidine (Pepcid AC), cimetidine (Tagamet HB), ranitidine (Zantac 75 and Zantac 150), or omeprazole (Prilosec). Read and follow all instructions on the label. If you use these medicines often, talk with your doctor. · Change your eating habits. ¨ It's best to eat several small meals instead of two or three large meals. ¨ After you eat, wait 2 to 3 hours before you lie down. Late-night snacks aren't a good idea. ¨ Chocolate, mint, and alcohol can make heartburn worse.  They relax the valve between the esophagus and the stomach. ¨ Spicy foods, foods that have a lot of acid (like tomatoes and oranges), and coffee can make heartburn symptoms worse in some people. If your symptoms are worse after you eat a certain food, you may want to stop eating that food to see if your symptoms get better. · Do not smoke or chew tobacco.  · If you get heartburn at night, raise the head of your bed 6 to 8 inches by putting the frame on blocks or placing a foam wedge under the head of your mattress. (Adding extra pillows does not work.)  · Do not wear tight clothing around your middle. · Lose weight if you need to. Losing just 5 to 10 pounds can help. When should you call for help? Call your doctor now or seek immediate medical care if:  ? · You have new or worse belly pain. ? · You are vomiting. ? Watch closely for changes in your health, and be sure to contact your doctor if:  ? · You have new or worse symptoms of indigestion. ? · You have trouble or pain swallowing. ? · You are losing weight. ? · You do not get better as expected. Where can you learn more? Go to http://catrina-dorie.info/. Enter N016 in the search box to learn more about \"Hiatal Hernia: Care Instructions. \"  Current as of: May 12, 2017  Content Version: 11.4  © 9375-7927 [employer]wise[employer]. Care instructions adapted under license by [employer] (which disclaims liability or warranty for this information). If you have questions about a medical condition or this instruction, always ask your healthcare professional. [place] [address] any warranty or liability for your use of this information.

## 2018-07-09 NOTE — PROGRESS NOTES
HISTORY OF PRESENT ILLNESS  Damian Blanco is a 67 y.o. male. HPI  Pt requesting referrals: GI and DERM. He passed heart investigations to rule out ischemic heart disease. Cardiology thinks he has hiatal hernia. C/O palpitations and dyspnea with exertion. Also skin lesion above left eyebrow x 2-3 weeks. Bleeding at times when hit. Review of Systems   Respiratory: Positive for shortness of breath. Cardiovascular: Positive for chest pain and palpitations. All other systems reviewed and are negative. Visit Vitals    /60 (BP 1 Location: Left arm, BP Patient Position: Sitting)  Comment: manual    Pulse 71    Temp 98.1 °F (36.7 °C) (Oral)    Resp 20    Ht 5' 7\" (1.702 m)    Wt 196 lb 6.4 oz (89.1 kg)    SpO2 97%    BMI 30.76 kg/m2       Physical Exam   Constitutional: He appears well-developed and well-nourished. Eyes:       Raised brown lesion above left eyebrow. Vitals reviewed. ASSESSMENT and PLAN    ICD-10-CM ICD-9-CM    1. Hiatal hernia K44.9 553.3 REFERRAL TO GASTROENTEROLOGY   2. Neoplasm of uncertain behavior of skin of eyebrow D48.5 238.2 REFERRAL TO DERMATOLOGY   3. Dyspnea on exertion R06.09 786.09 CBC WITH AUTOMATED DIFF      IRON PROFILE     Check for anemia. Obtain Care More referrals for specialist visit.

## 2018-07-09 NOTE — LETTER
7/11/2018 11:03 AM 
 
Mr. Rick Krishna [phone] Dear Rick Bone: Please find your most recent results below. Resulted Orders CBC WITH AUTOMATED DIFF Result Value Ref Range WBC 5.8 3.4 - 10.8 x10E3/uL  
 RBC 5.76 4.14 - 5.80 x10E6/uL HGB 12.0 (L) 13.0 - 17.7 g/dL HCT 39.9 37.5 - 51.0 % MCV 69 (L) 79 - 97 fL  
 MCH 20.8 (L) 26.6 - 33.0 pg  
 MCHC 30.1 (L) 31.5 - 35.7 g/dL  
 RDW 17.5 (H) 12.3 - 15.4 % PLATELET 334 871 - 022 x10E3/uL NEUTROPHILS 46 Not Estab. % Lymphocytes 41 Not Estab. % MONOCYTES 8 Not Estab. % EOSINOPHILS 4 Not Estab. % BASOPHILS 1 Not Estab. %  
 ABS. NEUTROPHILS 2.7 1.4 - 7.0 x10E3/uL Abs Lymphocytes 2.4 0.7 - 3.1 x10E3/uL  
 ABS. MONOCYTES 0.5 0.1 - 0.9 x10E3/uL  
 ABS. EOSINOPHILS 0.2 0.0 - 0.4 x10E3/uL  
 ABS. BASOPHILS 0.0 0.0 - 0.2 x10E3/uL IMMATURE GRANULOCYTES 0 Not Estab. %  
 ABS. IMM. GRANS. 0.0 0.0 - 0.1 x10E3/uL Narrative Performed at:  [address]  [id number] : Chip Garcia MD, Phone:  [phone] IRON PROFILE Result Value Ref Range TIBC 396 250 - 450 ug/dL UIBC 362 (H) 111 - 343 ug/dL Iron 34 (L) 38 - 169 ug/dL Iron % saturation 9 (LL) 15 - 55 % Narrative Performed at:  [address], [address] [place] [address]  [id number] : Chip Garcia MD, Phone:  [phone] RECOMMENDATIONS: 
Labs show mild anemia and low iron level. Make sure to follow up with GI Dr. Briseida Avalos to get stomach checked for ulcer and hiatal hernia. Please call me if you have any questions: [phone] Sincerely, Eric Sahu MD

## 2018-07-09 NOTE — PROGRESS NOTES
Identified pt with two pt identifiers(name and ). Chief Complaint   Patient presents with    Hiatal Hernia     wants tested for it - when he bends over he can't breath - Dr Wendy Amaya did stress test and it was normal - his GI is Dr Kashif Jaquez Mole     left eye on eye brow - has been there 2 or 3 weeks         There are no preventive care reminders to display for this patient. Wt Readings from Last 3 Encounters:   18 196 lb 6.4 oz (89.1 kg)   18 195 lb 6.4 oz (88.6 kg)   18 201 lb 3.2 oz (91.3 kg)     Temp Readings from Last 3 Encounters:   18 98.1 °F (36.7 °C) (Oral)   18 98.2 °F (36.8 °C) (Oral)   18 98.6 °F (37 °C) (Oral)     BP Readings from Last 3 Encounters:   18 132/60   18 110/60   18 138/65     Pulse Readings from Last 3 Encounters:   18 71   18 94   18 (!) 58         Learning Assessment:  :     Learning Assessment 2016   PRIMARY LEARNER Patient Patient   HIGHEST LEVEL OF EDUCATION - PRIMARY LEARNER  2 YEARS OF COLLEGE -   BARRIERS PRIMARY LEARNER NONE -   CO-LEARNER CAREGIVER No -   PRIMARY LANGUAGE ENGLISH ENGLISH   LEARNER PREFERENCE PRIMARY LISTENING DEMONSTRATION   ANSWERED BY self Patient   RELATIONSHIP SELF SELF       Depression Screening:  :     PHQ over the last two weeks 2018   Little interest or pleasure in doing things Not at all   Feeling down, depressed or hopeless Not at all   Total Score PHQ 2 0       Fall Risk Assessment:  :     Fall Risk Assessment, last 12 mths 2018   Able to walk? Yes   Fall in past 12 months? No       Abuse Screening:  :     Abuse Screening Questionnaire 2018   Do you ever feel afraid of your partner? N N   Are you in a relationship with someone who physically or mentally threatens you? N N   Is it safe for you to go home?  Y Y       Coordination of Care Questionnaire:  :     1) Have you been to an emergency room, urgent care clinic since your last visit? no Hospitalized since your last visit? no             2) Have you seen or consulted any other health care providers outside of [address] [place] [address] since your last visit? yes  Dr Jodi Dominguez (Include any pap smears or colon screenings in this section.)    3) Do you have an Advance Directive on file? yes  Are you interested in receiving information about Advance Directives? no    Reviewed record in preparation for visit and have obtained necessary documentation. Medication reconciliation up to date and corrected with patient at this time.

## 2018-07-10 LAB
BASOPHILS # BLD AUTO: 0 X10E3/UL (ref 0–0.2)
BASOPHILS NFR BLD AUTO: 1 %
EOSINOPHIL # BLD AUTO: 0.2 X10E3/UL (ref 0–0.4)
EOSINOPHIL NFR BLD AUTO: 4 %
ERYTHROCYTE [DISTWIDTH] IN BLOOD BY AUTOMATED COUNT: 17.5 % (ref 12.3–15.4)
HCT VFR BLD AUTO: 39.9 % (ref 37.5–51)
HGB BLD-MCNC: 12 G/DL (ref 13–17.7)
IMM GRANULOCYTES # BLD: 0 X10E3/UL (ref 0–0.1)
IMM GRANULOCYTES NFR BLD: 0 %
IRON SATN MFR SERPL: 9 % (ref 15–55)
IRON SERPL-MCNC: 34 UG/DL (ref 38–169)
LYMPHOCYTES # BLD AUTO: 2.4 X10E3/UL (ref 0.7–3.1)
LYMPHOCYTES NFR BLD AUTO: 41 %
MCH RBC QN AUTO: 20.8 PG (ref 26.6–33)
MCHC RBC AUTO-ENTMCNC: 30.1 G/DL (ref 31.5–35.7)
MCV RBC AUTO: 69 FL (ref 79–97)
MONOCYTES # BLD AUTO: 0.5 X10E3/UL (ref 0.1–0.9)
MONOCYTES NFR BLD AUTO: 8 %
NEUTROPHILS # BLD AUTO: 2.7 X10E3/UL (ref 1.4–7)
NEUTROPHILS NFR BLD AUTO: 46 %
PLATELET # BLD AUTO: 160 X10E3/UL (ref 150–379)
RBC # BLD AUTO: 5.76 X10E6/UL (ref 4.14–5.8)
TIBC SERPL-MCNC: 396 UG/DL (ref 250–450)
UIBC SERPL-MCNC: 362 UG/DL (ref 111–343)
WBC # BLD AUTO: 5.8 X10E3/UL (ref 3.4–10.8)

## 2018-07-11 NOTE — PROGRESS NOTES
Labs show mild anemia and low iron level. Make sure to follow up with GI Dr. Melyssa Kong to get stomach checked for ulcer and hiatal hernia.

## 2018-07-25 ENCOUNTER — OFFICE VISIT (OUTPATIENT)
Dept: FAMILY MEDICINE CLINIC | Age: 73
End: 2018-07-25

## 2018-07-25 VITALS
OXYGEN SATURATION: 96 % | SYSTOLIC BLOOD PRESSURE: 126 MMHG | DIASTOLIC BLOOD PRESSURE: 72 MMHG | HEART RATE: 70 BPM | WEIGHT: 195 LBS | HEIGHT: 67 IN | RESPIRATION RATE: 20 BRPM | BODY MASS INDEX: 30.61 KG/M2 | TEMPERATURE: 98.1 F

## 2018-07-25 DIAGNOSIS — E11.9 CONTROLLED TYPE 2 DIABETES MELLITUS WITHOUT COMPLICATION, WITHOUT LONG-TERM CURRENT USE OF INSULIN (HCC): Chronic | ICD-10-CM

## 2018-07-25 DIAGNOSIS — M47.896 OTHER OSTEOARTHRITIS OF SPINE, LUMBAR REGION: Primary | Chronic | ICD-10-CM

## 2018-07-25 DIAGNOSIS — D50.9 IRON DEFICIENCY ANEMIA, UNSPECIFIED IRON DEFICIENCY ANEMIA TYPE: ICD-10-CM

## 2018-07-25 DIAGNOSIS — I48.91 ATRIAL FIBRILLATION WITH RVR (HCC): ICD-10-CM

## 2018-07-25 RX ORDER — ACETAMINOPHEN, DIPHENHYDRAMINE HCL, PHENYLEPHRINE HCL 325; 25; 5 MG/1; MG/1; MG/1
TABLET ORAL
COMMUNITY
End: 2018-08-22

## 2018-07-25 RX ORDER — HYDROCODONE BITARTRATE AND ACETAMINOPHEN 10; 325 MG/1; MG/1
TABLET ORAL
Qty: 60 TAB | Refills: 0 | Status: SHIPPED | OUTPATIENT
Start: 2018-07-25 | End: 2018-08-22 | Stop reason: SDUPTHER

## 2018-07-25 NOTE — MR AVS SNAPSHOT
[facility] 
 
 
 [facility] [address] [facility] 
[phone] Patient: Katy Velasquez MRN: [id number] :1945 Visit Information Date & Time Provider Department Dept. Phone Encounter #  
 [id number]  8:88 PM Rosemarie Brizuela Avery Hiren [phone] [id number] Upcoming Health Maintenance Date Due Influenza Age 5 to Adult 2018 HEMOGLOBIN A1C Q6M 2019 EYE EXAM RETINAL OR DILATED Q1 2019 FOOT EXAM Q1 2019 MICROALBUMIN Q1 2019 FOBT Q 1 YEAR AGE 50-75 3/1/2019 LIPID PANEL Q1 2019 DTaP/Tdap/Td series (2 - Td) 2020 GLAUCOMA SCREENING Q2Y 2020 Allergies as of 2018  Review Complete On:  By: Rosemarie Brizuela MD  
  
 Severity Noted Reaction Type Reactions Ambien [Zolpidem] High 2016   Side Effect Other (comments) Sleep walking Doing things like cooking but he was not awake Current Immunizations  Reviewed on 2018 Name Date Influenza High Dose Vaccine PF 10/17/2017, 2016 10:51 AM  
 Influenza Vaccine 2015, 10/2/2013 Pneumococcal Conjugate (PCV-13) 1/15/2018 Pneumococcal Polysaccharide (PPSV-23) 2014 TB Skin Test (PPD) 2009 Tdap 2010 Zoster Vaccine, Live 3/15/2016 Not reviewed this visit You Were Diagnosed With   
  
 Codes Comments Other osteoarthritis of spine, lumbar region    -  Primary ICD-10-CM: M47.896 ICD-9-CM: 721.3 Controlled type 2 diabetes mellitus without complication, without long-term current use of insulin ([facility]     ICD-10-CM: E11.9 ICD-9-CM: 250.00 Iron deficiency anemia, unspecified iron deficiency anemia type     ICD-10-CM: D50.9 ICD-9-CM: 280.9 Atrial fibrillation with RVR (HCC)     ICD-10-CM: I48.91 
ICD-9-CM: 427.31 Vitals BP Pulse Temp Resp Height(growth percentile) Weight(growth percentile) 126/72 (BP 1 Location: Right arm, BP Patient Position: Sitting) 70 98.1 °F (36.7 °C) (Oral) 20 5' 7\" (1.702 m) 195 lb (88.5 kg) SpO2 BMI Smoking Status 96% 30.54 kg/m2 Never Smoker Vitals History BMI and BSA Data Body Mass Index Body Surface Area 30.54 kg/m 2 2.05 m 2 Preferred Pharmacy Pharmacy Name Phone [facility]/PHARMACY #[id number] Laina Torres, [address] [phone] Your Updated Medication List  
  
   
This list is accurate as of 7/25/18  2:06 PM.  Always use your most recent med list.  
  
  
  
  
 apixaban 5 mg tablet Commonly known as:  Marquis Saucer Take 1 Tab by mouth two (2) times a day. Indications: PREVENT THROMBOEMBOLISM IN CHRONIC ATRIAL FIBRILLATION  
  
 ascorbic acid (vitamin C) 500 mg tablet Commonly known as:  VITAMIN C Take 1,000 mg by mouth daily. aspirin 81 mg chewable tablet Take 1 Tab by mouth daily. atorvastatin 10 mg tablet Commonly known as:  LIPITOR Take 1 Tab by mouth daily. Indications: hypercholesterolemia * clobetasol 0.05 % external solution Commonly known as:  Eloy Diamond Apply  to affected area two (2) times a day. * clobetasol 0.05 % topical cream  
Commonly known as:  Eloy Diamond Apply  to affected area two (2) times a day. clotrimazole 1 % topical cream  
Commonly known as:  Mandi Heads Apply  to affected area two (2) times a day. CRESTOR 10 mg tablet Generic drug:  rosuvastatin Take 10 mg by mouth nightly. dilTIAZem 30 mg tablet Commonly known as:  CARDIZEM Take 1 Tab by mouth two (2) times a day. HYDROcodone-acetaminophen  mg tablet Commonly known as:  NORCO  
TAKE 1 TABLET BY MOUTH TWICE A DAY AS NEEDED FOR PAIN  
  
 levothyroxine 100 mcg tablet Commonly known as:  SYNTHROID Take 1 Tab by mouth Daily (before breakfast). Indications: hypothyroidism  
  
 melatonin 10 mg Tab Take  by mouth.  
  
 metFORMIN 1,000 mg tablet Commonly known as:  GLUCOPHAGE Take 1 Tab by mouth two (2) times daily (with meals). Indications: type 2 diabetes mellitus  
  
 multivitamin tablet Commonly known as:  ONE A DAY Take 1 Tab by mouth daily. omega 3-DHA-EPA-fish oil 1,000 mg (120 mg-180 mg) capsule Take 1 Cap by mouth daily. omeprazole 20 mg capsule Commonly known as:  PRILOSEC Take 1 Cap by mouth daily. triamcinolone acetonide 0.1 % topical cream  
Commonly known as:  KENALOG  
APPLY TWICE A DAY AS DIRECTED  
  
 VITAMIN D3 2,000 unit Tab Generic drug:  cholecalciferol (vitamin D3) Take  by mouth. * Notice: This list has 2 medication(s) that are the same as other medications prescribed for you. Read the directions carefully, and ask your doctor or other care provider to review them with you. Prescriptions Printed Refills HYDROcodone-acetaminophen (NORCO)  mg tablet 0 Sig: TAKE 1 TABLET BY MOUTH TWICE A DAY AS NEEDED FOR PAIN Class: Print Introducing [facility]! Dear Jo Palmer: Thank you for requesting a Dem[employer] account. Our records indicate that you already have an active DemandPoint account. You can access your account anytime at [email or web address]. [email or web address] Did you know that you can access your hospital and ER discharge instructions at any time in Dem[employer]? You can also review all of your test results from your hospital stay or ER visit. Additional Information If you have questions, please visit the Frequently Asked Questions section of the [employer] website at [email or web address] Remember, DemandPoint is NOT to be used for urgent needs. For medical emergencies, dial 911. Now available from your iPhone and Android! Please provide this summary of care documentation to your next provider. Your primary care clinician is listed as Terra Huff. If you have any questions after today's visit, please call [phone].

## 2018-07-25 NOTE — PROGRESS NOTES
Identified pt with two pt identifiers(name and ). Chief Complaint   Patient presents with    Diabetes     need A1C    Medication Refill        There are no preventive care reminders to display for this patient. Wt Readings from Last 3 Encounters:   18 195 lb (88.5 kg)   18 196 lb 6.4 oz (89.1 kg)   18 195 lb 6.4 oz (88.6 kg)     Temp Readings from Last 3 Encounters:   18 98.1 °F (36.7 °C) (Oral)   18 98.1 °F (36.7 °C) (Oral)   18 98.2 °F (36.8 °C) (Oral)     BP Readings from Last 3 Encounters:   18 126/72   18 132/60   18 110/60     Pulse Readings from Last 3 Encounters:   18 70   18 71   18 94         Learning Assessment:  :     Learning Assessment 2016   PRIMARY LEARNER Patient Patient   HIGHEST LEVEL OF EDUCATION - PRIMARY LEARNER  2 YEARS OF COLLEGE -   BARRIERS PRIMARY LEARNER NONE -   CO-LEARNER CAREGIVER No -   PRIMARY LANGUAGE ENGLISH ENGLISH   LEARNER PREFERENCE PRIMARY LISTENING DEMONSTRATION   ANSWERED BY self Patient   RELATIONSHIP SELF SELF       Depression Screening:  :     PHQ over the last two weeks 2018   Little interest or pleasure in doing things Not at all   Feeling down, depressed, irritable, or hopeless Not at all   Total Score PHQ 2 0       Fall Risk Assessment:  :     Fall Risk Assessment, last 12 mths 2018   Able to walk? Yes   Fall in past 12 months? No       Abuse Screening:  :     Abuse Screening Questionnaire 2018   Do you ever feel afraid of your partner? N N   Are you in a relationship with someone who physically or mentally threatens you? N N   Is it safe for you to go home?  Y Y       Coordination of Care Questionnaire:  :     1) Have you been to an emergency room, urgent care clinic since your last visit? no   Hospitalized since your last visit? no             2) Have you seen or consulted any other health care providers outside of [facility] since your last visit? no  (Include any pap smears or colon screenings in this section.)    3) Do you have an Advance Directive on file? yes  Are you interested in receiving information about Advance Directives? no    Reviewed record in preparation for visit and have obtained necessary documentation. Medication reconciliation up to date and corrected with patient at this time.

## 2018-07-25 NOTE — PROGRESS NOTES
HISTORY OF PRESENT ILLNESS  Rick Bone is a 67 y.o. male. HPI  FU DM, atrial fib, palpitations, anemia. Pt did not receive last lab results letter. He has mild anemia. EGD was cancelled but rescheduled for next week. He cont to have episodes of palpitations with exertion. [facility] checked his A1C 7/2/18 and it was 6.6%. Pt had passed cardiolite stress test.  Needs med refill of Hydrocodone. Review of Systems   Cardiovascular: Positive for chest pain and palpitations. Musculoskeletal: Positive for back pain. All other systems reviewed and are negative. Visit Vitals    /72 (BP 1 Location: Right arm, BP Patient Position: Sitting)  Comment: manual    Pulse 70    Temp 98.1 °F (36.7 °C) (Oral)    Resp 20    Ht 5' 7\" (1.702 m)    Wt 195 lb (88.5 kg)    SpO2 96%    BMI 30.54 kg/m2       Physical Exam   Constitutional: He appears well-developed and well-nourished. Cardiovascular: Normal rate. An irregularly irregular rhythm present. Vitals reviewed. ASSESSMENT and PLAN    ICD-10-CM ICD-9-CM    1. Other osteoarthritis of spine, lumbar region M47.896 721.3 HYDROcodone-acetaminophen (NORCO)  mg tablet   2. Controlled type 2 diabetes mellitus without complication, without long-term current use of insulin ([facility]) E11.9 250.00    3. Iron deficiency anemia, unspecified iron deficiency anemia type D50.9 280.9    4. Atrial fibrillation with RVR ([facility]) I48.91 427.31      FU EGD as scheduled. Med refill printed. May need to FU with Cardiology for palpitations.

## 2018-08-03 ENCOUNTER — OFFICE VISIT (OUTPATIENT)
Dept: FAMILY MEDICINE CLINIC | Age: 73
End: 2018-08-03

## 2018-08-03 VITALS
BODY MASS INDEX: 31.08 KG/M2 | SYSTOLIC BLOOD PRESSURE: 124 MMHG | WEIGHT: 198 LBS | HEART RATE: 80 BPM | RESPIRATION RATE: 20 BRPM | HEIGHT: 67 IN | TEMPERATURE: 98.4 F | DIASTOLIC BLOOD PRESSURE: 72 MMHG | OXYGEN SATURATION: 97 %

## 2018-08-03 DIAGNOSIS — I48.91 ATRIAL FIBRILLATION WITH RVR (HCC): Primary | ICD-10-CM

## 2018-08-03 DIAGNOSIS — R42 LIGHTHEADEDNESS: ICD-10-CM

## 2018-08-03 DIAGNOSIS — R06.09 DYSPNEA ON EXERTION: ICD-10-CM

## 2018-08-03 NOTE — PROGRESS NOTES
Identified pt with two pt identifiers(name and ). Chief Complaint   Patient presents with    Breathing Problem     He said he has pressure on his chest not pain - he had his UGI and it was normal - he does not understand - he had stress test it was normal        Health Maintenance Due   Topic    Influenza Age 5 to Adult        Wt Readings from Last 3 Encounters:   18 198 lb (89.8 kg)   18 195 lb (88.5 kg)   18 196 lb 6.4 oz (89.1 kg)     Temp Readings from Last 3 Encounters:   18 98.4 °F (36.9 °C) (Oral)   18 98.1 °F (36.7 °C) (Oral)   18 98.1 °F (36.7 °C) (Oral)     BP Readings from Last 3 Encounters:   18 124/72   18 126/72   18 132/60     Pulse Readings from Last 3 Encounters:   18 80   18 70   18 71         Learning Assessment:  :     Learning Assessment 2016   PRIMARY LEARNER Patient Patient   HIGHEST LEVEL OF EDUCATION - PRIMARY LEARNER  2 YEARS OF COLLEGE -   BARRIERS PRIMARY LEARNER NONE -   CO-LEARNER CAREGIVER No -   PRIMARY LANGUAGE ENGLISH ENGLISH   LEARNER PREFERENCE PRIMARY LISTENING DEMONSTRATION   ANSWERED BY self Patient   RELATIONSHIP SELF SELF       Depression Screening:  :     PHQ over the last two weeks 2018   Little interest or pleasure in doing things Not at all   Feeling down, depressed, irritable, or hopeless Not at all   Total Score PHQ 2 0       Fall Risk Assessment:  :     Fall Risk Assessment, last 12 mths 2018   Able to walk? Yes   Fall in past 12 months? No       Abuse Screening:  :     Abuse Screening Questionnaire 2018   Do you ever feel afraid of your partner? N N   Are you in a relationship with someone who physically or mentally threatens you? N N   Is it safe for you to go home?  Y Y       Coordination of Care Questionnaire:  :     1) Have you been to an emergency room, urgent care clinic since your last visit? no   Hospitalized since your last visit? no 2) Have you seen or consulted any other health care providers outside of [facility] since your last visit? yes UGI Dr Melyssa Kong at [facility] (Include any pap smears or colon screenings in this section.)    3) Do you have an Advance Directive on file? yes  Are you interested in receiving information about Advance Directives? no    Reviewed record in preparation for visit and have obtained necessary documentation. Medication reconciliation up to date and corrected with patient at this time.

## 2018-08-03 NOTE — PROGRESS NOTES
HISTORY OF PRESENT ILLNESS  Miguel Ángel Amado is a 67 y.o. male. HPI  FU EGD report - normal. No hiatal hernia. Pt cont to have spells of dyspnea and lightheadedness with minimal exertion. He had negative stress test in June. Hx atrial fibrillation. Review of Systems   Respiratory: Positive for shortness of breath. Neurological: Positive for dizziness. All other systems reviewed and are negative. Visit Vitals    /72 (BP 1 Location: Right arm, BP Patient Position: Sitting)  Comment: manual    Pulse 80    Temp 98.4 °F (36.9 °C) (Oral)    Resp 20    Ht 5' 7\" (1.702 m)    Wt 198 lb (89.8 kg)    SpO2 97%    BMI 31.01 kg/m2       Physical Exam   Constitutional: He appears well-developed and well-nourished. Vitals reviewed. ASSESSMENT and PLAN    ICD-10-CM ICD-9-CM    1. Atrial fibrillation with RVR ([facility]) I48.91 427.31 REFERRAL TO CARDIOLOGY   2. Dyspnea on exertion R06.09 786.09    3. Lightheadedness R42 780.4      Refer back to Cardiology. Need event monitor?

## 2018-08-03 NOTE — MR AVS SNAPSHOT
[facility] 
 
 
 [facility] [address] [facility] 
[phone] Patient: Everardo Berkowitz MRN: [id number] :1945 Visit Information Date & Time Provider Department Dept. Phone Encounter #  
 [id number]  6:17 PM Avery Mejia [id number] Upcoming Health Maintenance Date Due Influenza Age 5 to Adult 2018 HEMOGLOBIN A1C Q6M 2019 EYE EXAM RETINAL OR DILATED Q1 2019 FOOT EXAM Q1 2019 MICROALBUMIN Q1 2019 FOBT Q 1 YEAR AGE 50-75 3/1/2019 LIPID PANEL Q1 2019 DTaP/Tdap/Td series (2 - Td) 2020 GLAUCOMA SCREENING Q2Y 2020 Allergies as of 8/3/2018  Review Complete On: 1541 By: Hung Ashraf MD  
  
 Severity Noted Reaction Type Reactions Ambien [Zolpidem] High 2016   Side Effect Other (comments) Sleep walking Doing things like cooking but he was not awake Current Immunizations  Reviewed on 2018 Name Date Influenza High Dose Vaccine PF 10/17/2017, 2016 10:51 AM  
 Influenza Vaccine 2015, 10/2/2013 Pneumococcal Conjugate (PCV-13) 1/15/2018 Pneumococcal Polysaccharide (PPSV-23) 2014 TB Skin Test (PPD) 2009 Tdap 2010 Zoster Vaccine, Live 3/15/2016 Not reviewed this visit You Were Diagnosed With   
  
 Codes Comments Atrial fibrillation with RVR ([facility]    -  Primary ICD-10-CM: I48.91 
ICD-9-CM: 427.31 Vitals BP Pulse Temp Resp Height(growth percentile) Weight(growth percentile) 124/72 (BP 1 Location: Right arm, BP Patient Position: Sitting) 80 98.4 °F (36.9 °C) (Oral) 20 5' 7\" (1.702 m) 198 lb (89.8 kg) SpO2 BMI Smoking Status 97% 31.01 kg/m2 Never Smoker Vitals History BMI and BSA Data Body Mass Index Body Surface Area 31.01 kg/m 2 2.06 m 2 Preferred Pharmacy Pharmacy Name Phone [facility]Y [id number] [address] [phone] Your Updated Medication List  
  
   
This list is accurate as of 8/3/18  3:25 PM.  Always use your most recent med list.  
  
  
  
  
 apixaban 5 mg tablet Commonly known as:  Christiano [place] Take 1 Tab by mouth two (2) times a day. Indications: PREVENT THROMBOEMBOLISM IN CHRONIC ATRIAL FIBRILLATION  
  
 ascorbic acid (vitamin C) 500 mg tablet Commonly known as:  VITAMIN C Take 1,000 mg by mouth daily. aspirin 81 mg chewable tablet Take 1 Tab by mouth daily. atorvastatin 10 mg tablet Commonly known as:  LIPITOR Take 1 Tab by mouth daily. Indications: hypercholesterolemia * clobetasol 0.05 % external solution Commonly known as:  Aiyana Nestle Apply  to affected area two (2) times a day. * clobetasol 0.05 % topical cream  
Commonly known as:  Aiyana Nestle Apply  to affected area two (2) times a day. clotrimazole 1 % topical cream  
Commonly known as:  Dejah Sonja Apply  to affected area two (2) times a day. CRESTOR 10 mg tablet Generic drug:  rosuvastatin Take 10 mg by mouth nightly. dilTIAZem 30 mg tablet Commonly known as:  CARDIZEM Take 1 Tab by mouth two (2) times a day. HYDROcodone-acetaminophen  mg tablet Commonly known as:  NORCO  
TAKE 1 TABLET BY MOUTH TWICE A DAY AS NEEDED FOR PAIN  
  
 levothyroxine 100 mcg tablet Commonly known as:  SYNTHROID Take 1 Tab by mouth Daily (before breakfast). Indications: hypothyroidism  
  
 melatonin 10 mg Tab Take  by mouth.  
  
 metFORMIN 1,000 mg tablet Commonly known as:  GLUCOPHAGE Take 1 Tab by mouth two (2) times daily (with meals). Indications: type 2 diabetes mellitus  
  
 multivitamin tablet Commonly known as:  ONE A DAY Take 1 Tab by mouth daily. omega 3-DHA-EPA-fish oil 1,000 mg (120 mg-180 mg) capsule Take 1 Cap by mouth daily. omeprazole 20 mg capsule Commonly known as:  PRILOSEC  
 Take 1 Cap by mouth daily. triamcinolone acetonide 0.1 % topical cream  
Commonly known as:  KENALOG  
APPLY TWICE A DAY AS DIRECTED  
  
 VITAMIN D3 2,000 unit Tab Generic drug:  cholecalciferol (vitamin D3) Take  by mouth. * Notice: This list has 2 medication(s) that are the same as other medications prescribed for you. Read the directions carefully, and ask your doctor or other care provider to review them with you. We Performed the Following REFERRAL TO CARDIOLOGY [ZBD39 Custom] Comments:  
 Please evaluate patient for recurrent episodes of dyspnea and lightheadedness with exertion. Atrial fibrillation. Referral Information Referral ID Referred By Referred To  
  
 [id number] Brigette LEDESMA MD   
   [place] [address] [address] [place], [address] [place] [address] Phone: [phone] Fax: [phone] Visits Status Start Date End Date 1 New Request 8/3/18 8/3/19 If your referral has a status of pending review or denied, additional information will be sent to support the outcome of this decision. Introducing [facility]! Dear Shaheed Suazo: Thank you for requesting a bookletmobile account. Our records indicate that you already have an active bookletmobile account. You can access your account anytime at [email or web address]. [email or web address] Did you know that you can access your hospital and ER discharge instructions at any time in bookletmobile? You can also review all of your test results from your hospital stay or ER visit. Additional Information If you have questions, please visit the Frequently Asked Questions section of the bookletmobile website at [email or web address] Remember, bookletmobile is NOT to be used for urgent needs. For medical emergencies, dial 911. Now available from your iPhone and Android! Please provide this summary of care documentation to your next provider. Your primary care clinician is listed as Raffaele Vuong. If you have any questions after today's visit, please call [phone].

## 2018-08-22 ENCOUNTER — OFFICE VISIT (OUTPATIENT)
Dept: FAMILY MEDICINE CLINIC | Age: 73
End: 2018-08-22

## 2018-08-22 VITALS
BODY MASS INDEX: 30.86 KG/M2 | DIASTOLIC BLOOD PRESSURE: 60 MMHG | OXYGEN SATURATION: 97 % | SYSTOLIC BLOOD PRESSURE: 130 MMHG | TEMPERATURE: 97.8 F | HEIGHT: 67 IN | HEART RATE: 64 BPM | WEIGHT: 196.6 LBS | RESPIRATION RATE: 20 BRPM

## 2018-08-22 DIAGNOSIS — M47.896 OTHER OSTEOARTHRITIS OF SPINE, LUMBAR REGION: Primary | Chronic | ICD-10-CM

## 2018-08-22 DIAGNOSIS — F51.01 PRIMARY INSOMNIA: ICD-10-CM

## 2018-08-22 RX ORDER — HYDROCODONE BITARTRATE AND ACETAMINOPHEN 10; 325 MG/1; MG/1
TABLET ORAL
Qty: 60 TAB | Refills: 0 | Status: ON HOLD | OUTPATIENT
Start: 2018-08-24 | End: 2021-05-03

## 2018-08-22 NOTE — PROGRESS NOTES
Identified pt with two pt identifiers(name and ). Chief Complaint   Patient presents with    Sleep Problem     he only sleeps for about a hour or two - then he is awake - was taking melatonin but it does not work anymore    Medication Refill    Heart Problem     he said he does not have the money for heart monitor - he has to go for OV and then monitor and back to read        Health Maintenance Due   Topic    Influenza Age 5 to Adult        Wt Readings from Last 3 Encounters:   18 196 lb 9.6 oz (89.2 kg)   18 198 lb (89.8 kg)   18 195 lb (88.5 kg)     Temp Readings from Last 3 Encounters:   18 97.8 °F (36.6 °C) (Oral)   18 98.4 °F (36.9 °C) (Oral)   18 98.1 °F (36.7 °C) (Oral)     BP Readings from Last 3 Encounters:   18 130/60   18 124/72   18 126/72     Pulse Readings from Last 3 Encounters:   18 64   18 80   18 70         Learning Assessment:  :     Learning Assessment 2018   PRIMARY LEARNER Patient Patient Patient   HIGHEST LEVEL OF EDUCATION - PRIMARY LEARNER  2 YEARS OF COLLEGE 2 YEARS OF COLLEGE -   BARRIERS PRIMARY LEARNER NONE NONE -   CO-LEARNER CAREGIVER No No -   PRIMARY LANGUAGE ENGLISH ENGLISH ENGLISH   LEARNER PREFERENCE PRIMARY LISTENING LISTENING DEMONSTRATION   ANSWERED BY self self Patient   RELATIONSHIP SELF SELF SELF       Depression Screening:  :     PHQ over the last two weeks 2018   Little interest or pleasure in doing things Not at all   Feeling down, depressed, irritable, or hopeless Not at all   Total Score PHQ 2 0       Fall Risk Assessment:  :     Fall Risk Assessment, last 12 mths 2018   Able to walk? Yes   Fall in past 12 months? No       Abuse Screening:  :     Abuse Screening Questionnaire 2018   Do you ever feel afraid of your partner? N N   Are you in a relationship with someone who physically or mentally threatens you?  N N   Is it safe for you to go home? Rain Vee       Coordination of Care Questionnaire:  :     1) Have you been to an emergency room, urgent care clinic since your last visit? no   Hospitalized since your last visit? no             2) Have you seen or consulted any other health care providers outside of [address] [place] [address] since your last visit? no  (Include any pap smears or colon screenings in this section.)    3) Do you have an Advance Directive on file? yes  Are you interested in receiving information about Advance Directives? no    Reviewed record in preparation for visit and have obtained necessary documentation. Medication reconciliation up to date and corrected with patient at this time.

## 2018-08-22 NOTE — MR AVS SNAPSHOT
[address] [facility] 
[phone] Patient: Mehreen Forman MRN: [id number] :1945 Visit Information Date & Time Provider Department Dept. Phone Encounter #  
   2:26 AM Avery Jamison Hiren [phone] [id number] Upcoming Health Maintenance Date Due Influenza Age 5 to Adult 2018 HEMOGLOBIN A1C Q6M 2019 EYE EXAM RETINAL OR DILATED Q1 2019 FOOT EXAM Q1 2019 MICROALBUMIN Q1 2019 FOBT Q 1 YEAR AGE 50-75 3/1/2019 LIPID PANEL Q1 2019 DTaP/Tdap/Td series (2 - Td) 2020 GLAUCOMA SCREENING Q2Y 2020 Allergies as of 2018  Review Complete On:  By: Mandie Garner MD  
  
 Severity Noted Reaction Type Reactions Ambien [Zolpidem] High 2016   Side Effect Other (comments) Sleep walking Doing things like cooking but he was not awake Current Immunizations  Reviewed on 2018 Name Date Influenza High Dose Vaccine PF 10/17/2017, 2016 10:51 AM  
 Influenza Vaccine 2015, 10/2/2013 Pneumococcal Conjugate (PCV-13) 1/15/2018 Pneumococcal Polysaccharide (PPSV-23) 2014 TB Skin Test (PPD) 2009 Tdap 2010 Zoster Vaccine, Live 3/15/2016 Not reviewed this visit You Were Diagnosed With   
  
 Codes Comments Other osteoarthritis of spine, lumbar region    -  Primary ICD-10-CM: M47.896 ICD-9-CM: 721.3 Primary insomnia     ICD-10-CM: F51.01 
ICD-9-CM: 307.42 Vitals BP Pulse Temp Resp Height(growth percentile) Weight(growth percentile) 130/60 (BP 1 Location: Right arm, BP Patient Position: Sitting) 64 97.8 °F (36.6 °C) (Oral) 20 5' 7\" (1.702 m) 196 lb 9.6 oz (89.2 kg) SpO2 BMI Smoking Status 97% 30.79 kg/m2 Never Smoker BMI and BSA Data Body Mass Index Body Surface Area  30.79 kg/m 2 2.05 m 2  
  
  
 Preferred Pharmacy Pharmacy Name Phone [facility]ACY #[id number]- [address], [address] [facility]  [phone] Your Updated Medication List  
  
   
This list is accurate as of 8/22/18  9:36 AM.  Always use your most recent med list.  
  
  
  
  
 apixaban 5 mg tablet Commonly known as:  Isaiah Costain Take 1 Tab by mouth two (2) times a day. Indications: PREVENT THROMBOEMBOLISM IN CHRONIC ATRIAL FIBRILLATION  
  
 ascorbic acid (vitamin C) 500 mg tablet Commonly known as:  VITAMIN C Take 1,000 mg by mouth daily. aspirin 81 mg chewable tablet Take 1 Tab by mouth daily. atorvastatin 10 mg tablet Commonly known as:  LIPITOR Take 1 Tab by mouth daily. Indications: hypercholesterolemia * clobetasol 0.05 % external solution Commonly known as:  Mabel Whipple Apply  to affected area two (2) times a day. * clobetasol 0.05 % topical cream  
Commonly known as:  Mabel Whipple Apply  to affected area two (2) times a day. clotrimazole 1 % topical cream  
Commonly known as:  Tosha Belts Apply  to affected area two (2) times a day. CRESTOR 10 mg tablet Generic drug:  rosuvastatin Take 10 mg by mouth nightly. dilTIAZem 30 mg tablet Commonly known as:  CARDIZEM Take 1 Tab by mouth two (2) times a day. HYDROcodone-acetaminophen  mg tablet Commonly known as:  NORCO  
TAKE 1 TABLET BY MOUTH TWICE A DAY AS NEEDED FOR PAIN Start taking on:  8/24/2018  
  
 levothyroxine 100 mcg tablet Commonly known as:  SYNTHROID Take 1 Tab by mouth Daily (before breakfast). Indications: hypothyroidism  
  
 metFORMIN 1,000 mg tablet Commonly known as:  GLUCOPHAGE Take 1 Tab by mouth two (2) times daily (with meals). Indications: type 2 diabetes mellitus  
  
 multivitamin tablet Commonly known as:  ONE A DAY Take 1 Tab by mouth daily. omega 3-DHA-EPA-fish oil 1,000 mg (120 mg-180 mg) capsule Take 1 Cap by mouth daily. omeprazole 20 mg capsule Commonly known as:  PRILOSEC Take 1 Cap by mouth daily. triamcinolone acetonide 0.1 % topical cream  
Commonly known as:  KENALOG  
APPLY TWICE A DAY AS DIRECTED  
  
 VITAMIN D3 2,000 unit Tab Generic drug:  cholecalciferol (vitamin D3) Take  by mouth. * Notice: This list has 2 medication(s) that are the same as other medications prescribed for you. Read the directions carefully, and ask your doctor or other care provider to review them with you. Prescriptions Printed Refills HYDROcodone-acetaminophen (NORCO)  mg tablet 0 Starting on: 8/24/2018 Sig: TAKE 1 TABLET BY MOUTH TWICE A DAY AS NEEDED FOR PAIN Class: Print Patient Instructions Learning About Sleeping Well What does sleeping well mean? Sleeping well means getting enough sleep. How much sleep is enough varies among people. The number of hours you sleep is not as important as how you feel when you wake up. If you do not feel refreshed, you probably need more sleep. Another sign of not getting enough sleep is feeling tired during the day. The average total nightly sleep time is 7½ to 8 hours. Healthy adults may need a little more or a little less than this. Why is getting enough sleep important? Getting enough quality sleep is a basic part of good health. When your sleep suffers, your mood and your thoughts can suffer too. You may find yourself feeling more grumpy or stressed. Not getting enough sleep also can lead to serious problems, including injury, accidents, anxiety, and depression. What might cause poor sleeping? Many things can cause sleep problems, including: · Stress. Stress can be caused by fear about a single event, such as giving a speech. Or you may have ongoing stress, such as worry about work or school. · Depression, anxiety, and other mental or emotional conditions. · Changes in your sleep habits or surroundings. This includes changes that happen where you sleep, such as noise, light, or sleeping in a different bed. It also includes changes in your sleep pattern, such as having jet lag or working a late shift. · Health problems, such as pain, breathing problems, and restless legs syndrome. · Lack of regular exercise. How can you help yourself? Here are some tips that may help you sleep more soundly and wake up feeling more refreshed. Your sleeping area · Use your bedroom only for sleeping and sex. A bit of light reading may help you fall asleep. But if it doesn't, do your reading elsewhere in the house. Don't watch TV in bed. · Be sure your bed is big enough to stretch out comfortably, especially if you have a sleep partner. · Keep your bedroom quiet, dark, and cool. Use curtains, blinds, or a sleep mask to block out light. To block out noise, use earplugs, soothing music, or a \"white noise\" machine. Your evening and bedtime routine · Create a relaxing bedtime routine. You might want to take a warm shower or bath, listen to soothing music, or drink a cup of noncaffeinated tea. · Go to bed at the same time every night. And get up at the same time every morning, even if you feel tired. What to avoid · Limit caffeine (coffee, tea, caffeinated sodas) during the day, and don't have any for at least 4 to 6 hours before bedtime. · Don't drink alcohol before bedtime. Alcohol can cause you to wake up more often during the night. · Don't smoke or use tobacco, especially in the evening. Nicotine can keep you awake. · Don't take naps during the day, especially close to bedtime. · Don't lie in bed awake for too long. If you can't fall asleep, or if you wake up in the middle of the night and can't get back to sleep within 15 minutes or so, get out of bed and go to another room until you feel sleepy.  
· Don't take medicine right before bed that may keep you awake or make you feel hyper or energized. Your doctor can tell you if your medicine may do this and if you can take it earlier in the day. If you can't sleep · Imagine yourself in a peaceful, pleasant scene. Focus on the details and feelings of being in a place that is relaxing. · Get up and do a quiet or boring activity until you feel sleepy. · Don't drink any liquids after 6 p.m. if you wake up often because you have to go to the bathroom. Where can you learn more? Go to http://catrina-dorie.info/. Enter O462 in the search box to learn more about \"Learning About Sleeping Well. \" Current as of: December 7, 2017 Content Version: 11.7 © 9639-8550 [employer], [employer]. Care instructions adapted under license by [employer] (which disclaims liability or warranty for this information). If you have questions about a medical condition or this instruction, always ask your healthcare professional. [place] [address] any warranty or liability for your use of this information. Introducing [facility]! Dear Glenys Tucker: Thank you for requesting a [employer] account. Our records indicate that you already have an active [employer] account. You can access your account anytime at [email or web address] Did you know that you can access your hospital and ER discharge instructions at any time in [employer]? You can also review all of your test results from your hospital stay or ER visit. Additional Information If you have questions, please visit the Frequently Asked Questions section of the [employer] website at [email or web address] Remember, [employer] is NOT to be used for urgent needs. For medical emergencies, dial 911. Now available from your iPhone and Android! Please provide this summary of care documentation to your next provider. Your primary care clinician is listed as Marylu Amin.  If you have any questions after today's visit, please call [phone].

## 2018-08-22 NOTE — PROGRESS NOTES
HISTORY OF PRESENT ILLNESS  Morales Thomason is a 67 y.o. male. HPI  FU chronic back pain. Need med refill. It limits his activity level now. Claims he had been to Pain Specialist and nothing else could be done. I don't have these medical records. Gary has records from Dr. Matthew Olivo with plain XRs of spine showing spondylosis. Also C/O insomnia. He is worried about wife's health problems. Has been taking Melatonin but no relief . Sleeps only a few hours. Taking naps during day. He cannot afford to get holter done and see Cardiology. Too many copays. Review of Systems   Musculoskeletal: Positive for back pain. Psychiatric/Behavioral: The patient has insomnia. All other systems reviewed and are negative. Patient Active Problem List   Diagnosis Code    Mixed hyperlipidemia E78.2    Obese E66.9    Chronic back pain M54.9, G89.29    Acquired hypothyroidism E03.9    GERD (gastroesophageal reflux disease) K21.9    Diabetes mellitus type 2, [facility] E11.9    Polyposis coli D12.6    Spondylosis of lumbar joint M47.816    Skin cancer of nose C44.301    Actinic keratosis L57.0    Atopic dermatitis L20.9    Atrial fibrillation with RVR (HCC) I48.91     Current Outpatient Prescriptions on File Prior to Visit   Medication Sig Dispense Refill    rosuvastatin (CRESTOR) 10 mg tablet Take 10 mg by mouth nightly.  cholecalciferol, vitamin D3, (VITAMIN D3) 2,000 unit tab Take  by mouth.  omega 3-DHA-EPA-fish oil 1,000 mg (120 mg-180 mg) capsule Take 1 Cap by mouth daily.  multivitamin (ONE A DAY) tablet Take 1 Tab by mouth daily.  ascorbic acid, vitamin C, (VITAMIN C) 500 mg tablet Take 1,000 mg by mouth daily.  clobetasol (TEMOVATE) 0.05 % topical cream Apply  to affected area two (2) times a day. 60 Tube 3    clotrimazole (LOTRIMIN) 1 % topical cream Apply  to affected area two (2) times a day.  15 g 0    apixaban (ELIQUIS) 5 mg tablet Take 1 Tab by mouth two (2) times a day. Indications: PREVENT THROMBOEMBOLISM IN CHRONIC ATRIAL FIBRILLATION 180 Tab 3    levothyroxine (SYNTHROID) 100 mcg tablet Take 1 Tab by mouth Daily (before breakfast). Indications: hypothyroidism 90 Tab 3    atorvastatin (LIPITOR) 10 mg tablet Take 1 Tab by mouth daily. Indications: hypercholesterolemia 90 Tab 3    omeprazole (PRILOSEC) 20 mg capsule Take 1 Cap by mouth daily. 90 Cap 3    metFORMIN (GLUCOPHAGE) 1,000 mg tablet Take 1 Tab by mouth two (2) times daily (with meals). Indications: type 2 diabetes mellitus 180 Tab 3    dilTIAZem (CARDIZEM) 30 mg tablet Take 1 Tab by mouth two (2) times a day. 180 Tab 3    aspirin 81 mg chewable tablet Take 1 Tab by mouth daily. 30 Tab 0    triamcinolone acetonide (KENALOG) 0.1 % topical cream APPLY TWICE A DAY AS DIRECTED  5    clobetasol (TEMOVATE) 0.05 % external solution Apply  to affected area two (2) times a day. No current facility-administered medications on file prior to visit. Visit Vitals    /60 (BP 1 Location: Right arm, BP Patient Position: Sitting)  Comment: manual    Pulse 64    Temp 97.8 °F (36.6 °C) (Oral)    Resp 20    Ht 5' 7\" (1.702 m)    Wt 196 lb 9.6 oz (89.2 kg)    SpO2 97%    BMI 30.79 kg/m2       Physical Exam   Constitutional: He appears well-developed and well-nourished. Vitals reviewed. ASSESSMENT and PLAN    ICD-10-CM ICD-9-CM    1. Other osteoarthritis of spine, lumbar region M47.896 721.3 HYDROcodone-acetaminophen (NORCO)  mg tablet   2. Primary insomnia F51.01 307.42      I asked him to get records from Pain Clinic evaluation. I need thi son record to cont to fill pain med. Med refill x 1 month. Handout on insomnia management. Patient Instructions        Learning About Sleeping Well  What does sleeping well mean? Sleeping well means getting enough sleep. How much sleep is enough varies among people.   The number of hours you sleep is not as important as how you feel when you wake up. If you do not feel refreshed, you probably need more sleep. Another sign of not getting enough sleep is feeling tired during the day. The average total nightly sleep time is 7½ to 8 hours. Healthy adults may need a little more or a little less than this. Why is getting enough sleep important? Getting enough quality sleep is a basic part of good health. When your sleep suffers, your mood and your thoughts can suffer too. You may find yourself feeling more grumpy or stressed. Not getting enough sleep also can lead to serious problems, including injury, accidents, anxiety, and depression. What might cause poor sleeping? Many things can cause sleep problems, including:  · Stress. Stress can be caused by fear about a single event, such as giving a speech. Or you may have ongoing stress, such as worry about work or school. · Depression, anxiety, and other mental or emotional conditions. · Changes in your sleep habits or surroundings. This includes changes that happen where you sleep, such as noise, light, or sleeping in a different bed. It also includes changes in your sleep pattern, such as having jet lag or working a late shift. · Health problems, such as pain, breathing problems, and restless legs syndrome. · Lack of regular exercise. How can you help yourself? Here are some tips that may help you sleep more soundly and wake up feeling more refreshed. Your sleeping area  · Use your bedroom only for sleeping and sex. A bit of light reading may help you fall asleep. But if it doesn't, do your reading elsewhere in the house. Don't watch TV in bed. · Be sure your bed is big enough to stretch out comfortably, especially if you have a sleep partner. · Keep your bedroom quiet, dark, and cool. Use curtains, blinds, or a sleep mask to block out light. To block out noise, use earplugs, soothing music, or a \"white noise\" machine. Your evening and bedtime routine  · Create a relaxing bedtime routine.  You might want to take a warm shower or bath, listen to soothing music, or drink a cup of noncaffeinated tea. · Go to bed at the same time every night. And get up at the same time every morning, even if you feel tired. What to avoid  · Limit caffeine (coffee, tea, caffeinated sodas) during the day, and don't have any for at least 4 to 6 hours before bedtime. · Don't drink alcohol before bedtime. Alcohol can cause you to wake up more often during the night. · Don't smoke or use tobacco, especially in the evening. Nicotine can keep you awake. · Don't take naps during the day, especially close to bedtime. · Don't lie in bed awake for too long. If you can't fall asleep, or if you wake up in the middle of the night and can't get back to sleep within 15 minutes or so, get out of bed and go to another room until you feel sleepy. · Don't take medicine right before bed that may keep you awake or make you feel hyper or energized. Your doctor can tell you if your medicine may do this and if you can take it earlier in the day. If you can't sleep  · Imagine yourself in a peaceful, pleasant scene. Focus on the details and feelings of being in a place that is relaxing. · Get up and do a quiet or boring activity until you feel sleepy. · Don't drink any liquids after 6 p.m. if you wake up often because you have to go to the bathroom. Where can you learn more? Go to http://catrina-dorie.info/. Enter K827 in the search box to learn more about \"Learning About Sleeping Well. \"  Current as of: December 7, 2017  Content Version: 11.7  © 4031-7375 Uscreen.tv. Care instructions adapted under license by [employer] (which disclaims liability or warranty for this information). If you have questions about a medical condition or this instruction, always ask your healthcare professional. [place] [address] any warranty or liability for your use of this information.

## 2018-08-22 NOTE — PATIENT INSTRUCTIONS

## 2018-08-31 ENCOUNTER — OFFICE VISIT (OUTPATIENT)
Dept: FAMILY MEDICINE CLINIC | Age: 73
End: 2018-08-31

## 2018-08-31 VITALS
HEIGHT: 67 IN | TEMPERATURE: 98.3 F | WEIGHT: 195 LBS | HEART RATE: 78 BPM | RESPIRATION RATE: 20 BRPM | BODY MASS INDEX: 30.61 KG/M2 | SYSTOLIC BLOOD PRESSURE: 122 MMHG | OXYGEN SATURATION: 97 % | DIASTOLIC BLOOD PRESSURE: 72 MMHG

## 2018-08-31 DIAGNOSIS — M47.896 OTHER OSTEOARTHRITIS OF SPINE, LUMBAR REGION: Chronic | ICD-10-CM

## 2018-08-31 RX ORDER — HYDROCODONE BITARTRATE AND ACETAMINOPHEN 10; 325 MG/1; MG/1
TABLET ORAL
Qty: 60 TAB | Refills: 0 | Status: CANCELLED | OUTPATIENT
Start: 2018-08-31

## 2018-08-31 NOTE — PROGRESS NOTES
Identified pt with two pt identifiers(name and ). Chief Complaint   Patient presents with    Results    Medication Refill        Health Maintenance Due   Topic    Influenza Age 5 to Adult        Wt Readings from Last 3 Encounters:   18 195 lb (88.5 kg)   18 196 lb 9.6 oz (89.2 kg)   18 198 lb (89.8 kg)     Temp Readings from Last 3 Encounters:   18 97.8 °F (36.6 °C) (Oral)   18 98.4 °F (36.9 °C) (Oral)   18 98.1 °F (36.7 °C) (Oral)     BP Readings from Last 3 Encounters:   18 130/60   18 124/72   18 126/72     Pulse Readings from Last 3 Encounters:   18 64   18 80   18 70         Learning Assessment:  :     Learning Assessment 2018   PRIMARY LEARNER Patient Patient Patient   HIGHEST LEVEL OF EDUCATION - PRIMARY LEARNER  2 YEARS OF COLLEGE 2 YEARS OF COLLEGE -   BARRIERS PRIMARY LEARNER NONE NONE -   Rukhsana Covington CAREGIVER No No -   PRIMARY LANGUAGE ENGLISH ENGLISH ENGLISH   LEARNER PREFERENCE PRIMARY LISTENING LISTENING DEMONSTRATION   ANSWERED BY self self Patient   RELATIONSHIP SELF SELF SELF       Depression Screening:  :     PHQ over the last two weeks 2018   Little interest or pleasure in doing things Not at all   Feeling down, depressed, irritable, or hopeless Not at all   Total Score PHQ 2 0       Fall Risk Assessment:  :     Fall Risk Assessment, last 12 mths 2018   Able to walk? Yes   Fall in past 12 months? No       Abuse Screening:  :     Abuse Screening Questionnaire 2018   Do you ever feel afraid of your partner? N N   Are you in a relationship with someone who physically or mentally threatens you? N N   Is it safe for you to go home?  Y Y       Coordination of Care Questionnaire:  :     1) Have you been to an emergency room, urgent care clinic since your last visit? no   Hospitalized since your last visit? no             2) Have you seen or consulted any other health care providers outside of [facility] since your last visit? no  (Include any pap smears or colon screenings in this section.)    3) Do you have an Advance Directive on file? yes  Are you interested in receiving information about Advance Directives? no    Reviewed record in preparation for visit and have obtained necessary documentation. Medication reconciliation up to date and corrected with patient at this time.

## 2018-08-31 NOTE — PROGRESS NOTES
HISTORY OF PRESENT ILLNESS  Gene Laura is a 67 y.o. male. HPI  Pt presents to discuss imaging records from previous PCP. He brings in lumbar spine XR's from Dec 2015 and June 2013. Both show minimal lumbar spondylosis. Pt is C/O worsening back pain that warrants ongoing use of Hydrocodone. He had told me last visit that he had tried PT and been through Pain Management in past but we have no record of this. At this time he wants to be re-evaluated by [facility]. I also explained that he may need to get established again with a Pain Clinic for ongoing narcotic management. Review of Systems   Musculoskeletal: Positive for back pain. All other systems reviewed and are negative. Visit Vitals    /72 (BP 1 Location: Left arm, BP Patient Position: Sitting)  Comment: manual    Pulse 78    Temp 98.3 °F (36.8 °C) (Oral)    Resp 20    Ht 5' 7\" (1.702 m)    Wt 195 lb (88.5 kg)    SpO2 97%    BMI 30.54 kg/m2       Physical Exam   Constitutional: He appears well-developed and well-nourished. No distress. Vitals reviewed. ASSESSMENT and PLAN    ICD-10-CM ICD-9-CM    1. Other osteoarthritis of spine, lumbar region M47.896 721.3 REFERRAL TO ORTHOPEDIC SURGERY      REFERRAL TO PAIN CLINIC     Will [facility] to [facility]. Rony Mcmullen. [facility] and get a referral.  I refilled his Hydrocodone last week for a 30 day supply.

## 2018-09-05 ENCOUNTER — OFFICE VISIT (OUTPATIENT)
Dept: FAMILY MEDICINE CLINIC | Age: 73
End: 2018-09-05

## 2018-09-05 ENCOUNTER — TELEPHONE (OUTPATIENT)
Dept: FAMILY MEDICINE CLINIC | Age: 73
End: 2018-09-05

## 2018-09-05 ENCOUNTER — HOSPITAL ENCOUNTER (OUTPATIENT)
Dept: GENERAL RADIOLOGY | Age: 73
Discharge: HOME | End: 2018-09-05
Attending: FAMILY MEDICINE
Payer: COMMERCIAL

## 2018-09-05 VITALS
RESPIRATION RATE: 20 BRPM | HEART RATE: 68 BPM | DIASTOLIC BLOOD PRESSURE: 70 MMHG | TEMPERATURE: 98.3 F | BODY MASS INDEX: 30.64 KG/M2 | WEIGHT: 195.2 LBS | HEIGHT: 67 IN | SYSTOLIC BLOOD PRESSURE: 132 MMHG | OXYGEN SATURATION: 96 %

## 2018-09-05 DIAGNOSIS — G89.29 CHRONIC MIDLINE LOW BACK PAIN, WITH SCIATICA PRESENCE UNSPECIFIED: ICD-10-CM

## 2018-09-05 DIAGNOSIS — G89.29 CHRONIC MIDLINE LOW BACK PAIN, WITH SCIATICA PRESENCE UNSPECIFIED: Primary | ICD-10-CM

## 2018-09-05 DIAGNOSIS — M54.5 CHRONIC MIDLINE LOW BACK PAIN, WITH SCIATICA PRESENCE UNSPECIFIED: ICD-10-CM

## 2018-09-05 DIAGNOSIS — M54.5 CHRONIC MIDLINE LOW BACK PAIN, WITH SCIATICA PRESENCE UNSPECIFIED: Primary | ICD-10-CM

## 2018-09-05 PROCEDURE — 72100 X-RAY EXAM L-S SPINE 2/3 VWS: CPT

## 2018-09-05 NOTE — TELEPHONE ENCOUNTER
Please advise pt his back XR again shows MILD degenerative arthritis of his lumbar spine. He will make appt with [facility].

## 2018-09-05 NOTE — MR AVS SNAPSHOT
[address] [place] [address] 
 
 
 [facility] [address] [facility] 
[phone] Patient: Baltazar Spatz MRN: [id number] :1945 Visit Information Date & Time Provider Department Dept. Phone Encounter #  
   0:83 PM Avery Booker [phone] [id number] Upcoming Health Maintenance Date Due Influenza Age 5 to Adult 2018 HEMOGLOBIN A1C Q6M 2019 EYE EXAM RETINAL OR DILATED Q1 2019 FOOT EXAM Q1 2019 MICROALBUMIN Q1 2019 MEDICARE YEARLY EXAM 2019 FOBT Q 1 YEAR AGE 50-75 3/1/2019 LIPID PANEL Q1 2019 DTaP/Tdap/Td series (2 - Td) 2020 GLAUCOMA SCREENING Q2Y 2020 Allergies as of 2018  Review Complete On: 3/5/2085 By: Kaci Fonseca MD  
  
 Severity Noted Reaction Type Reactions Ambien [Zolpidem] High 2016   Side Effect Other (comments) Sleep walking Doing things like cooking but he was not awake Current Immunizations  Reviewed on 2018 Name Date Influenza High Dose Vaccine PF 10/17/2017, 2016 10:51 AM  
 Influenza Vaccine 2015, 10/2/2013 Pneumococcal Conjugate (PCV-13) 1/15/2018 Pneumococcal Polysaccharide (PPSV-23) 2014 TB Skin Test (PPD) 2009 Tdap 2010 Zoster Vaccine, Live 3/15/2016 Not reviewed this visit You Were Diagnosed With   
  
 Codes Comments Chronic midline low back pain, with sciatica presence unspecified    -  Primary ICD-10-CM: M54.5, G89.29 ICD-9-CM: 724.2, 338.29 Vitals BP Pulse Temp Resp Height(growth percentile) Weight(growth percentile) 132/70 (BP 1 Location: Right arm, BP Patient Position: Sitting) 68 98.3 °F (36.8 °C) (Oral) 20 5' 7\" (1.702 m) 195 lb 3.2 oz (88.5 kg) SpO2 BMI Smoking Status 96% 30.57 kg/m2 Never Smoker BMI and BSA Data Body Mass Index Body Surface Area  30.57 kg/m 2 2.05 m 2  
  
  
 Preferred Pharmacy Pharmacy Name Phone [facility]ACY #[id number]- [address], [address] [facility]  [phone] Your Updated Medication List  
  
   
This list is accurate as of 9/5/18  2:15 PM.  Always use your most recent med list.  
  
  
  
  
 apixaban 5 mg tablet Commonly known as:  Isaiah Costain Take 1 Tab by mouth two (2) times a day. Indications: PREVENT THROMBOEMBOLISM IN CHRONIC ATRIAL FIBRILLATION  
  
 ascorbic acid (vitamin C) 500 mg tablet Commonly known as:  VITAMIN C Take 1,000 mg by mouth daily. aspirin 81 mg chewable tablet Take 1 Tab by mouth daily. atorvastatin 10 mg tablet Commonly known as:  LIPITOR Take 1 Tab by mouth daily. Indications: hypercholesterolemia * clobetasol 0.05 % external solution Commonly known as:  Mabel Miami Apply  to affected area two (2) times a day. * clobetasol 0.05 % topical cream  
Commonly known as:  Mabel Miami Apply  to affected area two (2) times a day. clotrimazole 1 % topical cream  
Commonly known as:  Tosha Belts Apply  to affected area two (2) times a day. CRESTOR 10 mg tablet Generic drug:  rosuvastatin Take 10 mg by mouth nightly. dilTIAZem 30 mg tablet Commonly known as:  CARDIZEM Take 1 Tab by mouth two (2) times a day. HYDROcodone-acetaminophen  mg tablet Commonly known as:  NORCO  
TAKE 1 TABLET BY MOUTH TWICE A DAY AS NEEDED FOR PAIN  
  
 levothyroxine 100 mcg tablet Commonly known as:  SYNTHROID Take 1 Tab by mouth Daily (before breakfast). Indications: hypothyroidism  
  
 metFORMIN 1,000 mg tablet Commonly known as:  GLUCOPHAGE Take 1 Tab by mouth two (2) times daily (with meals). Indications: type 2 diabetes mellitus  
  
 multivitamin tablet Commonly known as:  ONE A DAY Take 1 Tab by mouth daily. omega 3-DHA-EPA-fish oil 1,000 mg (120 mg-180 mg) capsule Take 1 Cap by mouth daily. omeprazole 20 mg capsule Commonly known as:  PRILOSEC Take 1 Cap by mouth daily. triamcinolone acetonide 0.1 % topical cream  
Commonly known as:  KENALOG  
APPLY TWICE A DAY AS DIRECTED  
  
 VITAMIN D3 2,000 unit Tab Generic drug:  cholecalciferol (vitamin D3) Take  by mouth. * Notice: This list has 2 medication(s) that are the same as other medications prescribed for you. Read the directions carefully, and ask your doctor or other care provider to review them with you. We Performed the Following REFERRAL TO PHYSICIAL MEDICINE REHAB [MPE94 Custom] Comments:  
 Chronic low back pain. To-Do List   
 09/05/2018 Imaging:  XR SPINE LUMB MIN 4 V Referral Information Referral ID Referred By Referred To  
  
 [id number] Belgica LEDESMA MD   
   [id number] [facility] [address] [place] [address] Phone: [phone] Fax: [phone] Visits Status Start Date End Date 1 New Request 9/5/18 9/5/19 If your referral has a status of pending review or denied, additional information will be sent to support the outcome of this decision. Introducing [facility]! Dear Gómez Mckay: Thank you for requesting a [employer] account. Our records indicate that you already have an active [employer] account. You can access your account anytime at [email or web address] Did you know that you can access your hospital and ER discharge instructions at any time in [employer]? You can also review all of your test results from your hospital stay or ER visit. Additional Information If you have questions, please visit the Frequently Asked Questions section of the [employer] website at [email or web address] Remember, [employer] is NOT to be used for urgent needs. For medical emergencies, dial 911. Now available from your iPhone and Android! Please provide this summary of care documentation to your next provider. Your primary care clinician is listed as Maribeth Escobar. If you have any questions after today's visit, please call [phone].

## 2018-09-05 NOTE — PROGRESS NOTES
Identified pt with two pt identifiers(name and ). Chief Complaint   Patient presents with    Back Pain     he said he wants to go to pain management - Deloris Nino MD and they will want XR and MRI         Health Maintenance Due   Topic    Influenza Age 5 to Adult    He will get his 1st week in Oct    Wt Readings from Last 3 Encounters:   18 195 lb 3.2 oz (88.5 kg)   18 195 lb (88.5 kg)   18 196 lb 9.6 oz (89.2 kg)     Temp Readings from Last 3 Encounters:   18 98.3 °F (36.8 °C) (Oral)   18 98.3 °F (36.8 °C) (Oral)   18 97.8 °F (36.6 °C) (Oral)     BP Readings from Last 3 Encounters:   18 132/70   18 122/72   18 130/60     Pulse Readings from Last 3 Encounters:   18 68   18 78   18 64         Learning Assessment:  :     Learning Assessment 2018   PRIMARY LEARNER Patient Patient Patient   HIGHEST LEVEL OF EDUCATION - PRIMARY LEARNER  2 YEARS OF COLLEGE 2 YEARS OF COLLEGE -   BARRIERS PRIMARY LEARNER NONE NONE -   CO-LEARNER CAREGIVER No No -   PRIMARY LANGUAGE ENGLISH ENGLISH ENGLISH   LEARNER PREFERENCE PRIMARY LISTENING LISTENING DEMONSTRATION   ANSWERED BY self self Patient   RELATIONSHIP SELF SELF SELF       Depression Screening:  :     PHQ over the last two weeks 2018   Little interest or pleasure in doing things Not at all   Feeling down, depressed, irritable, or hopeless Not at all   Total Score PHQ 2 0       Fall Risk Assessment:  :     Fall Risk Assessment, last 12 mths 2018   Able to walk? Yes   Fall in past 12 months? No       Abuse Screening:  :     Abuse Screening Questionnaire 2018   Do you ever feel afraid of your partner? N N   Are you in a relationship with someone who physically or mentally threatens you? N N   Is it safe for you to go home? Y Y       Coordination of Care Questionnaire:  :     1) Have you been to an emergency room, urgent care clinic since your last visit? no   Hospitalized since your last visit? no             2) Have you seen or consulted any other health care providers outside of [address] [place] [address] since your last visit? no  (Include any pap smears or colon screenings in this section.)    3) Do you have an Advance Directive on file? yes  Are you interested in receiving information about Advance Directives? no    Reviewed record in preparation for visit and have obtained necessary documentation. Medication reconciliation up to date and corrected with patient at this time.

## 2018-09-05 NOTE — PROGRESS NOTES
HISTORY OF PRESENT ILLNESS  Lisa Felix is a 67 y.o. male. HPI  FU chronic low back pain. He wishes to see Dr. Whitney Anderson at [facility] and [facility]. He thinks in network for [facility]. Need up to date XR. I explained that MRI will not be authorized because of no recent PT. Review of Systems   Musculoskeletal: Positive for back pain. All other systems reviewed and are negative. Visit Vitals    /70 (BP 1 Location: Right arm, BP Patient Position: Sitting)  Comment: manual    Pulse 68    Temp 98.3 °F (36.8 °C) (Oral)    Resp 20    Ht 5' 7\" (1.702 m)    Wt 195 lb 3.2 oz (88.5 kg)    SpO2 96%    BMI 30.57 kg/m2       Physical Exam   Constitutional: He appears well-developed and well-nourished. Vitals reviewed. ASSESSMENT and PLAN    ICD-10-CM ICD-9-CM    1.  Chronic midline low back pain, with sciatica presence unspecified M54.5 724.2 XR SPINE LUMB MIN 4 V    G89.29 338.29 REFERRAL TO PHYSICIAL MEDICINE REHAB

## 2018-10-10 ENCOUNTER — HOSPITAL ENCOUNTER (OUTPATIENT)
Dept: MRI IMAGING | Age: 73
Discharge: HOME | End: 2018-10-10
Attending: PHYSICAL MEDICINE & REHABILITATION
Payer: MEDICARE

## 2018-10-10 DIAGNOSIS — Z79.891 LONG TERM CURRENT USE OF OPIATE ANALGESIC: ICD-10-CM

## 2018-10-10 DIAGNOSIS — M54.50 LOW BACK PAIN: ICD-10-CM

## 2018-10-10 DIAGNOSIS — M51.36 OTHER INTERVERTEBRAL DISC DEGENERATION, LUMBAR REGION: ICD-10-CM

## 2018-10-10 DIAGNOSIS — M47.816 SPONDYLOSIS WITHOUT MYELOPATHY OR RADICULOPATHY, LUMBAR REGION: ICD-10-CM

## 2018-10-10 PROCEDURE — 72148 MRI LUMBAR SPINE W/O DYE: CPT

## 2020-03-31 NOTE — ED NOTES
Dr. Eligio Urbina called and will come see the pt here before transferred to It[facility]ne. Patient Education      Thank you for allowing us to take care of you today! We hope we addressed all of your concerns and needs. We strive to provide excellent quality care in the Emergency Department. You will receive a survey after your visit to evaluate the care you were provided. Should you receive a survey from us, we invite you to share your experience and tell us what made it excellent. It was a pleasure serving you, we invite you to share your experience with us, in our pursuit for excellence, should you be selected to receive a survey. The exam and treatment you received in the Emergency Department were for an urgent problem and are not intended as complete care. It is important that you follow up with a doctor, nurse practitioner, or physician assistant for ongoing care. If your symptoms become worse or you do not improve as expected and you are unable to reach your usual health care provider, you should return to the Emergency Department. We are available 24 hours a day. Please take your discharge instructions with you when you go to your follow-up appointment. If you have any problem arranging a follow-up appointment, contact the Emergency Department immediately. If a prescription has been provided, please have it filled as soon as possible to prevent a delay in treatment. Read the entire medication instruction sheet provided to you by the pharmacy. If you have any questions or reservations about taking the medication due to side effects or interactions with other medications, please call your primary care physician or contact the ER to speak with the charge nurse. Make an appointment with your family doctor or the physician you were referred to for follow-up of this visit as instructed on your discharge paperwork, as this is mandatory follow-up. Return to the ER if you are unable to be seen or if you are unable to be seen in a timely manner.     If you have any problem arranging the follow-up visit, contact the Emergency Department immediately. I hope you feel better and thank you again for allow us to provide you with excellent care today! Warmest regards,    Sloan Park PA-C  Emergency Medicine Physician Assistant      Substance Abuse and Addiction Resources    [facility]  [phone]  Closed meeting- family members that have been affected bysomeone alcohol abuse  Open meeting everyone can attend. Alcoholic's Anonymous [phone]  Non professional (alcoholics in recovery helping others)  [employer] (talk about sobriety, have desire)  No charge    [address].  Jessica Haynes is the Treatment Consultant in the [facility]  Free one-on-one phone consultation and insurance verification  12 step based meetings and philosophy  Family programs and sessions    [facility] [phone]  Free individual assessment  Intensive outpatient outpatient program  Ambulatory withdrawal management/detoxification  Insurance required    [facility] [phone] ext [phone] or [phone]  For patients who are homeless, getting ready to be homeless or with no insurance  Sliding fee scale available for self pay  Patients go Monday-Friday from 8-4:30 to central intake for a shelter and then to [facility] for services  Offers a variety of services I.e. Laundry, shower, eye clinic, medical clinic, dental, substance abuse services, case management, etc.    [facility] [phone]  Make appointment with counselor  $39 fee for initial hour intake    [facility] 81 [phone]  Offers Detox and Inpatient Treatment for men only. Social detox  Is a homeless shelter with longterm 12 step program. Can choose just access to the [facility] component  Must be able to walk <1miles one way to attend classes, be highly motivated and willing to complete all 12 steps.   8 months- 1 year is the typical length of stay if accepted    [facility] [place] [phone]  For residents of [facility]  They offer outpatient treatment and can make referrals for inpatient careBased on income. Will [employer]. Accept Medicaid. They have a walk in clinic that patients can go to be screened for services. Two on the [facility] [place] side of [facility]:  [place] [id number], [place] [address] (near [facility]). Walk in hours: Mon or Wed       12:30pm - [address] Osito Mcgee Walk in hours: Tuesday 12:30pm 3pm Wed       8:30am- 11am    [employer] [phone]  $3500 entry fee  12 step meeting plan  No sex offenders accepted. Must get a job within 30 days after admission  After the 12 week, additional $125/week to stay    Narcotic Anonymous [phone], [phone]  Will refer to [facility] into Triage (takes 10-15 minutes)  Proof of [facility] residence with Picture ID  Medicaid and Self Pay. NO Private insurance    [employer] [phone]  Referrals come from organizations like [facility] [employer] [facility], [employer], [facility]. Must be self pay. No insurance allowed. No patients on prescribed narcotics. No sex offenders. Need all medical mental health information and medication list sent prior to admit. [facility] [phone] ext [address] between 21-65  Need social security card and picture ID  Must pass breath test and 10 panel Urine Drug Screen  No Sex Offenders or Psychiatric patients  Must not have been a 3x repeat at this facility  6 month in house- has to participate in work therapy 40 hours/week  Participates in spiritual classes, bible study and Mormon    Syriac Alcoholics Anonymous [address], sent by [employer]  No Sex [facility] (by [address])    [facility]  [phone]  Monday through Friday 8:30am to 5:00pm  Brief Telephone Interview.  Then will be scheduled for next substance abuse services orientation group and then scheduled for intake interview. [facility]  [phone]  Walk in Monday through Friday 9:00AM to 3:00PM  Bring Picture ID, Proof of residence (piece of mail), Proof of Insurance ([address] scale), Proof of income (any)    Elijah -[phone]  Walk in then Assessment by licensed professional   [place] office ([facility]) Monday, Tuesday, Thursday  9AM to 789 [facility] office ([address] [facility] [address] [facility] [address] [place], [address]) [facility]akasi [facility] 81  [phone]  Walk In Monday to Friday starting at [address], Proof of residence (piece of mail), Proof of insurance (Medicaid/sliding scale)  At 9AM is the Substance Abuse Services Orientation Group and then scheduled for Intake Evaluation. Tiffanie Tapia scheduled using triage protocol. Patients will be evaluated and referred to appropriate treatment. A  is usually assigned, but there is usually a waiting list. All [place] residents without financial resources may be referred to [facility]. Patients must bring proof that they are residents of the [address] ([employer], rent receipt, picture ID, etc.).   [phone]  Crisis: [address] [facility]  [address] [facility]     [phone]  Detox unit: [address]  [address] [place] [address]       Intakes are Monday, Wednesday, Thursday from 8 AM - 12 noon. Patients may walk in any day and speak with a counselor.  Patient must bring a picture ID.   [phone]    [facility]  [facility]       No detox available. Patient must be medically stable and able to work. Patient needs social security card and an ID. Patient does not need to be homeless. [address]       Detoxification available. Patients must be medically-cleared to go to detox and must be free of benzodiazepines and barbituates. THP prefers if they also have Clonidine available to help them with their detox. [address] [facility] [address] program available for men. Patients need to be able to work and follow rules. 90 days inpatient followed by 90 days in a [facility] house. [facility] that hosts a number of [address] and NA groups each week   [phone]    [facility]  [facility]       Patients must first go through registration and financial eligibility screening, 8 - 11:30 AM and 1 - 4 PM Mondays through Friday. [facility] also provides homeless services, vision, dental and medical services. [address] outpatient and some inpatient (sober living houses) for men and women. Fees are determined at time of admission. Patient must be able to work and follow rules. [address] for [facility]       Outpatient program for men, women and adolescents. Assessments can usually be scheduled within 24 hours. Intensive outpatient programs also available. Methadone and Suboxone detoxification also available. They do not accept Medicaid or Medicare. [facility] Google End: [address] End: [address] Aw[address]   Centralized Intake: [phone]  Crisis: Pineda Schaefer.    [phone]  Crisis: [address] [facility] [phone]  Crisis: [phone]  Local  Office/Meeting Information   [facility],[address]Rd Floor Providers    Accepts Insured Patients Only:  [facility]  [address]       [phone]   Near the corner of [facility] and [address] in the near [address] [place] [address]. Accepts most insurance including Medicaid/Medicare. No psychiatry. On the [facility] bus line. [address] Ul[address]addy Dumont [phone]  [address] [facility] ([address]  [address] [place]. 30 [facility], [address] [place])     [phone]   Accepts most major insurances. Psychiatry available. Some DBT groups. [facility])    [phone]   Mixture of psychologists and psychiatrists. They do not accept Medicaid or Medicare. The [facility]  [address] [facility]       [phone]   Mixture of clinical social workers and psychologists. Sliding Scale/Financial Aid/Differing Payment Options  [place] [id number] [facility]      [phone]   Our own Wellington Layer and Sammuel Arm. Variety of treatment options, including DBT. [address]       [phone]   Provides a variety of group and individual counseling options. Insurance, Medicaid, Medicare and sliding scale      Medicaid/Medicare providers in the [address] area  [address] [place] [address]. 22nd [address]       [phone]    [facility]         [address]       [phone]    [facility]  [address], [address]    [phone] ex.  [phone]      Services for patients without Medicaid, Medicare or Insurance  [facility]       [phone]   See handout in separate folder    [facility]  [address] [facility], [address]   Phone: [phone]    [facility]  [facility] , [place], [address]   Phone: [phone], select option (2) to confirm time for treatment     [facility]  [address] [place] [address] [address]   Monday-Friday: 8am-4pm  Phone: [phone] of Dentistry Urgent [address] [facility], [address] [place] [address] Km [address] [facility] [address]  Phone: [phone] to confirm a time for emergency treatment  Pediatrics: [phone]     [facility]  Phone: [phone]    Affordable Dentures  [address], [address] [address]   Phone [phone] or [phone]  Hours [phone] (extractions)  Simple tooth extraction: $60 per tooth, $55 per x-ray     [facility] (in [place])  [facility] AT [place] only  Phone: [phone], leave message saying you need an appointment to register  Hours: Wed 6-9p     [facility]  Disabled and over age 58 only  Phone: [phone]    Non-Urgent [facility])  [address] [place] [address]  Phone: [phone]     A.D. [address] [facility] at [address] [facility]   Dental Clinic: [phone]  Oral Surgery Clinic: [phone]         Local Dentists    [facility]  [address] [place] [address] Drive  [phone]    Katie Christopher DDS  [address]  [phone]    Prachi Eugene DDS  [address]  [address]  100 [facility]  [phone]        Abscessed Tooth: Care Instructions  Your Care Instructions    An abscessed tooth is a tooth that has a pocket of pus in the tissues around it. Pus forms when the body tries to fight an infection caused by bacteria. If the pus cannot drain, it forms an abscess. An abscessed tooth can cause red, swollen gums and throbbing pain, especially when you chew. You may have a bad taste in your mouth and a fever, and your jaw may swell. Damage to the tooth, untreated tooth decay, or gum disease can cause an abscessed tooth.   An abscessed tooth needs to be treated by a dental professional right away. If it is not treated, the infection could spread to other parts of your body. Your dentist will give you antibiotics to stop the infection. He or she may make a hole in the tooth or cut open (leesa) the abscess inside your mouth so that the infection can drain, which should relieve your pain. You may need to have a root canal treatment, which tries to save your tooth by taking out the infected pulp and replacing it with a healing medicine and/or a filling. If these treatments do not work, your tooth may have to be removed. Follow-up care is a key part of your treatment and safety. Be sure to make and go to all appointments, and call your doctor if you are having problems. It's also a good idea to know your test results and keep a list of the medicines you take. How can you care for yourself at home? · Reduce pain and swelling in your face and jaw by putting ice or a cold pack on the outside of your cheek. Do this for 10 to 20 minutes at a time. Put a thin cloth between the ice and your skin. · Take pain medicines exactly as directed. ? If the doctor gave you a prescription medicine for pain, take it as prescribed. ? If you are not taking a prescription pain medicine, ask your doctor if you can take an over-the-counter medicine. · Take antibiotics as directed. Do not stop taking them just because you feel better. You need to take the full course of antibiotics. To prevent tooth abscess  · Brush and floss every day. Have regular dental checkups. · Eat a healthy diet. Avoid sugary foods and drinks. · Do not smoke or vape with nicotine. And don't use spit tobacco. Tobacco and nicotine slow your ability to heal. They increase your risk for gum disease and cancer of the mouth and throat. If you need help quitting, talk to your doctor about stop-smoking programs and medicines. These can increase your chances of quitting for good.   When should you call for help? Call 911 anytime you think you may need emergency care. For example, call if:    · You have trouble breathing.    Call your doctor now or seek immediate medical care if:    · You have new or worse symptoms of infection, such as:  ? Increased pain, swelling, warmth, or redness. ? Red streaks leading from the area. ? Pus draining from the area. ? A fever.    Watch closely for changes in your health, and be sure to contact your doctor if:    · You do not get better as expected. Where can you learn more? Go to http://jacob-denise.info/  Enter L466 in the search box to learn more about \"Abscessed Tooth: Care Instructions. \"  Current as of: July 28, 2019Content Version: 12.4  © 0524-4585 Healthwise, Incorporated. Care instructions adapted under license by [employer] (which disclaims liability or warranty for this information). If you have questions about a medical condition or this instruction, always ask your healthcare professional. [address] any warranty or liability for your use of this information.

## 2020-08-07 ENCOUNTER — HOSPITAL ENCOUNTER (OUTPATIENT)
Dept: MRI IMAGING | Age: 75
Discharge: HOME | End: 2020-08-07
Attending: FAMILY MEDICINE

## 2020-08-07 DIAGNOSIS — Z00.6 RESEARCH EXAM: ICD-10-CM

## 2020-10-23 ENCOUNTER — TRANSCRIBE ORDER (OUTPATIENT)
Dept: SCHEDULING | Age: 75
End: 2020-10-23

## 2020-10-23 DIAGNOSIS — R26.81 UNSTEADINESS: ICD-10-CM

## 2020-10-23 DIAGNOSIS — R27.0 ATAXIA: ICD-10-CM

## 2020-10-23 DIAGNOSIS — R51.9 BILATERAL HEADACHE: Primary | ICD-10-CM

## 2020-10-30 ENCOUNTER — HOSPITAL ENCOUNTER (OUTPATIENT)
Dept: CT IMAGING | Age: 75
Discharge: HOME | End: 2020-10-30
Attending: FAMILY MEDICINE
Payer: MEDICARE

## 2020-10-30 ENCOUNTER — HOSPITAL ENCOUNTER (OUTPATIENT)
Dept: VASCULAR SURGERY | Age: 75
Discharge: HOME | End: 2020-10-30
Attending: FAMILY MEDICINE
Payer: MEDICARE

## 2020-10-30 DIAGNOSIS — R27.0 ATAXIA: ICD-10-CM

## 2020-10-30 DIAGNOSIS — R26.81 UNSTEADINESS: ICD-10-CM

## 2020-10-30 DIAGNOSIS — R51.9 BILATERAL HEADACHE: ICD-10-CM

## 2020-10-30 LAB — CREAT BLD-MCNC: 0.9 MG/DL (ref 0.6–1.3)

## 2020-10-30 PROCEDURE — 70470 CT HEAD/BRAIN W/O & W/DYE: CPT

## 2020-10-30 PROCEDURE — 74011000636 HC RX REV CODE- 636

## 2020-10-30 PROCEDURE — 82565 ASSAY OF CREATININE: CPT

## 2020-10-30 PROCEDURE — 93880 EXTRACRANIAL BILAT STUDY: CPT

## 2020-10-30 RX ADMIN — IOPAMIDOL 100 ML: 612 INJECTION, SOLUTION INTRAVENOUS at 15:14

## 2020-10-31 LAB
LEFT CCA DIST DIAS: 13.7 CM/S
LEFT CCA DIST SYS: 72 CM/S
LEFT CCA PROX DIAS: 24.6 CM/S
LEFT CCA PROX SYS: 134.4 CM/S
LEFT ECA DIAS: 19.28 CM/S
LEFT ECA SYS: 137.8 CM/S
LEFT ICA DIST DIAS: 23.7 CM/S
LEFT ICA DIST SYS: 67.7 CM/S
LEFT ICA MID DIAS: 22.4 CM/S
LEFT ICA MID SYS: 71.6 CM/S
LEFT ICA PROX DIAS: 17.2 CM/S
LEFT ICA PROX SYS: 78 CM/S
LEFT ICA/CCA SYS: 1.08
LEFT SUBCLAVIAN DIAS: 0 CM/S
LEFT SUBCLAVIAN SYS: 186.2 CM/S
LEFT VERTEBRAL DIAS: 9.25 CM/S
LEFT VERTEBRAL SYS: 44.1 CM/S
RIGHT CCA DIST DIAS: 15.5 CM/S
RIGHT CCA DIST SYS: 68.8 CM/S
RIGHT CCA PROX DIAS: 13.9 CM/S
RIGHT CCA PROX SYS: 85 CM/S
RIGHT ECA DIAS: 7.43 CM/S
RIGHT ECA SYS: 97.8 CM/S
RIGHT ICA DIST DIAS: 18.2 CM/S
RIGHT ICA DIST SYS: 61.1 CM/S
RIGHT ICA MID DIAS: 20.3 CM/S
RIGHT ICA MID SYS: 72 CM/S
RIGHT ICA PROX DIAS: 17.1 CM/S
RIGHT ICA PROX SYS: 62.3 CM/S
RIGHT ICA/CCA SYS: 1.1
RIGHT SUBCLAVIAN DIAS: 9 CM/S
RIGHT SUBCLAVIAN SYS: 75.5 CM/S
RIGHT VERTEBRAL DIAS: 10.55 CM/S
RIGHT VERTEBRAL SYS: 49 CM/S

## 2020-11-05 ENCOUNTER — TELEPHONE (OUTPATIENT)
Dept: CARDIOLOGY CLINIC | Age: 75
End: 2020-11-05

## 2020-11-05 NOTE — TELEPHONE ENCOUNTER
Deidre Busby with Dr. Luis Price' office is calling to get a soon appointment for the patient dx: new onset exertional chest pain. Please advise.     Phone #: [phone]  Thanks

## 2020-11-06 NOTE — TELEPHONE ENCOUNTER
Returned call to Clair Foster with Dr Jordan Forbes office unavailable to the phone left message with PSR to return call.

## 2020-11-09 NOTE — TELEPHONE ENCOUNTER
Angy Shaw with Dr. Elizabeth Thomas is requesting a call back concerning the patient. Stefanie Shani that she did not get [employer]. Please advise.     Phone: [phone]

## 2020-11-10 ENCOUNTER — OFFICE VISIT (OUTPATIENT)
Dept: CARDIOLOGY CLINIC | Age: 75
End: 2020-11-10
Payer: MEDICARE

## 2020-11-10 VITALS
WEIGHT: 194 LBS | OXYGEN SATURATION: 94 % | HEIGHT: 67 IN | DIASTOLIC BLOOD PRESSURE: 82 MMHG | SYSTOLIC BLOOD PRESSURE: 134 MMHG | HEART RATE: 62 BPM | BODY MASS INDEX: 30.45 KG/M2

## 2020-11-10 DIAGNOSIS — R00.2 PALPITATIONS: ICD-10-CM

## 2020-11-10 DIAGNOSIS — E78.2 MIXED HYPERLIPIDEMIA: ICD-10-CM

## 2020-11-10 DIAGNOSIS — R07.9 CHEST PAIN, UNSPECIFIED TYPE: Primary | ICD-10-CM

## 2020-11-10 DIAGNOSIS — I48.0 PAF (PAROXYSMAL ATRIAL FIBRILLATION) (HCC): ICD-10-CM

## 2020-11-10 PROCEDURE — 1101F PT FALLS ASSESS-DOCD LE1/YR: CPT | Performed by: INTERNAL MEDICINE

## 2020-11-10 PROCEDURE — 93000 ELECTROCARDIOGRAM COMPLETE: CPT | Performed by: INTERNAL MEDICINE

## 2020-11-10 PROCEDURE — G8536 NO DOC ELDER MAL SCRN: HCPCS | Performed by: INTERNAL MEDICINE

## 2020-11-10 PROCEDURE — 99204 OFFICE O/P NEW MOD 45 MIN: CPT | Performed by: INTERNAL MEDICINE

## 2020-11-10 PROCEDURE — G8417 CALC BMI ABV UP PARAM F/U: HCPCS | Performed by: INTERNAL MEDICINE

## 2020-11-10 PROCEDURE — 3017F COLORECTAL CA SCREEN DOC REV: CPT | Performed by: INTERNAL MEDICINE

## 2020-11-10 PROCEDURE — G8427 DOCREV CUR MEDS BY ELIG CLIN: HCPCS | Performed by: INTERNAL MEDICINE

## 2020-11-10 PROCEDURE — G8432 DEP SCR NOT DOC, RNG: HCPCS | Performed by: INTERNAL MEDICINE

## 2020-11-10 NOTE — PROGRESS NOTES
Wilbur Lovett is a 75 y.o. male    Chief Complaint   Patient presents with    Chest Pain       Chest pain patient states when bending over; exertions    SOB patient states some SOB    Dizziness patient states some dizziness with episodes     Swelling No    Refills No    Visit Vitals  /82 (BP 1 Location: Left arm, BP Patient Position: Sitting)   Pulse 62   Ht 5' 7\" (1.702 m)   Wt 194 lb (88 kg)   SpO2 94%   BMI 30.38 kg/m²       1. Have you been to the ER, urgent care clinic since your last visit? Hospitalized since your last visit? No    2. Have you seen or consulted any other health care providers outside of the [address] [place] [address] since your last visit? Include any pap smears or colon screening.   PCP on 11/6/2020

## 2020-11-10 NOTE — PROGRESS NOTES
Mckay Loomis MD    [address] [facility], [address] [facility]    Office [phone],[facility] [phone]      Rachelle Kaplan is a 75 y.o. male is here for eval of chest tightness/hx of afib      Dear Rhonda Light,    I had the pleasure of seeing  Mr Rachelle Kaplan  in the office today. Assessment:  Chest tightness  PAF - 1/7/17Afib with RVR - converted to SR with cardizem gtt  DM  HLD  Hx of TIA       Plan:         Stress Nuc study  Event monitor  ECHO  Patient on cardizem 30mg bid. Continue Eliquis. Office visit-Dr. Watts-11/5/2020 reviewed. Chest pain-referred to cardiology. Started on sublingual nitroglycerin prn.  10/19/2020-creatinine 0.81 6/9/2020-, HDL 39, , triglycerides 327, 2/21/2020-normal free T4  Aggressive cardiovascular risk for modification. Follow-up in 4-6 weeks/earlier prn    Patient understands the plan. All questions were answered to the patient's satisfaction. I appreciate the opportunity to be involved in [address] [place] [address]. See note below for details. Please do not hesitate to contact us with questions or concerns. Mckay Loomis MD    Cardiac Testing/ Procedures: A. Cardiac Cath/PCI:    B.ECHO/JEANNETTE: 1/6/17 Left ventricle: Systolic function was normal. Ejection fraction was  estimated in the range of 55 % to 60 %. There were no regional wall motion  abnormalities. Aortic valve: There was mild regurgitation. C.StressNuclear/Stress ECHO/Stress test: 1/2017 - Ex nuc 6.01min /Nml/EF 59%    D.Vascular:    E. EP:    F. Miscellaneous:    Subjective: Rachelle Kaplan is a 75 y.o. male who returns for follow up visit. History of a pounding sensation in his chest whenever he exerts himself associated with mild dizziness over the past few weeks. Mild chest tightness. No dyspnea. Has had CT head/carotid ultrasound which were within normal limits. ? History of presyncope last week which was transient.   Continues to take Cardizem twice daily as well as Eliquis. 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  cholecalciferol, vitamin D3, (VITAMIN D3) 2,000 unit tab Take  by mouth.  omega 3-DHA-EPA-fish oil 1,000 mg (120 mg-180 mg) capsule Take 1 Cap by mouth daily.  clobetasol (TEMOVATE) 0.05 % external solution Apply  to affected area two (2) times a day.  multivitamin (ONE A DAY) tablet Take 1 Tab by mouth daily.  ascorbic acid, vitamin C, (VITAMIN C) 500 mg tablet Take 1,000 mg by mouth daily.  clobetasol (TEMOVATE) 0.05 % topical cream Apply  to affected area two (2) times a day. 60 Tube    clotrimazole (LOTRIMIN) 1 % topical cream Apply  to affected area two (2) times a day. 15 g    apixaban (ELIQUIS) 5 mg tablet Take 1 Tab by mouth two (2) times a day. Indications: PREVENT THROMBOEMBOLISM IN CHRONIC ATRIAL FIBRILLATION 180 Tab    levothyroxine (SYNTHROID) 100 mcg tablet Take 1 Tab by mouth Daily (before breakfast). Indications: hypothyroidism 90 Tab    atorvastatin (LIPITOR) 10 mg tablet Take 1 Tab by mouth daily. Indications: hypercholesterolemia 90 Tab    omeprazole (PRILOSEC) 20 mg capsule Take 1 Cap by mouth daily. 90 Cap    metFORMIN (GLUCOPHAGE) 1,000 mg tablet Take 1 Tab by mouth two (2) times daily (with meals). Indications: type 2 diabetes mellitus 180 Tab    dilTIAZem (CARDIZEM) 30 mg tablet Take 1 Tab by mouth two (2) times a day. 180 Tab    aspirin 81 mg chewable tablet Take 1 Tab by mouth daily.  30 Tab    HYDROcodone-acetaminophen (NORCO)  mg tablet TAKE 1 TABLET BY MOUTH TWICE A DAY AS NEEDED FOR PAIN (Patient not taking: Reported on 11/10/2020) 60 Tab    triamcinolone acetonide (KENALOG) 0.1 % topical cream APPLY TWICE A DAY AS DIRECTED      No current facility-administered medications for this visit. Physical Exam:  Visit Vitals  /82 (BP 1 Location: Left arm, BP Patient Position: Sitting)   Pulse 62   Ht 5' 7\" (1.702 m)   Wt 194 lb (88 kg)   SpO2 94%   BMI 30.38 kg/m²          Gen: Well-developed, well-nourished, in no acute distress  Neck: Supple,No JVD, No Carotid Bruit,   Resp: No accessory muscle use, Clear breath sounds, No rales or rhonchi  Card: Regular Rate,Rythm,Normal S1, S2, No murmurs, rubs or gallop. No thrills. Abd:  Soft, non-tender, non-distended,BS+,   MSK: No cyanosis  Skin: No rashes    Neuro: moving all four extremities , follows commands appropriately  Psych:  Good insight, oriented to person, place , alert, Nml Affect  LE: No edema    EKG: Sinus rhythm, normal axis, normal intervals      LABS: Reviewed    Medication Side Effects and Warnings were discussed with patient: yes  Patient Labs were reviewed and or requested:  yes  Patient Past Records were reviewed and or requested: yes    ATTENTION:   This medical record was transcribed using an electronic medical records/speech recognition system. Although proofread, it may and can contain electronic, spelling and other errors. Corrections may be executed at a later time. Please feel free to contact us for any clarifications as needed.             Aneudy Avendano MD

## 2020-11-10 NOTE — LETTER
11/11/20 Patient: Concetta Linda YOB: 1945 Date of Visit: 11/10/2020 Omi Owens MD 
[phone] VIA Facsimile: [phone] Dear Omi Owens MD, Thank you for referring Mr. Francisco Lewis to [facility] for evaluation. My notes for this consultation are attached. If you have questions, please do not hesitate to call me. I look forward to following your patient along with you. Sincerely, Arianna Don MD

## 2020-11-23 ENCOUNTER — ANCILLARY PROCEDURE (OUTPATIENT)
Dept: CARDIOLOGY CLINIC | Age: 75
End: 2020-11-23
Payer: MEDICARE

## 2020-11-23 VITALS
BODY MASS INDEX: 30.45 KG/M2 | WEIGHT: 194 LBS | HEIGHT: 67 IN | DIASTOLIC BLOOD PRESSURE: 72 MMHG | SYSTOLIC BLOOD PRESSURE: 142 MMHG

## 2020-11-23 VITALS — BODY MASS INDEX: 30.45 KG/M2 | WEIGHT: 194 LBS | HEIGHT: 67 IN

## 2020-11-23 DIAGNOSIS — R07.9 CHEST PAIN, UNSPECIFIED TYPE: ICD-10-CM

## 2020-11-23 DIAGNOSIS — R00.2 PALPITATIONS: ICD-10-CM

## 2020-11-23 LAB
STRESS ANGINA INDEX: 0
STRESS BASELINE DIAS BP: 72 MMHG
STRESS BASELINE HR: 78 BPM
STRESS BASELINE SYS BP: 142 MMHG
STRESS ESTIMATED WORKLOAD: 7 METS
STRESS EXERCISE DUR MIN: NORMAL MIN:SEC
STRESS O2 SAT PEAK: 97 %
STRESS O2 SAT REST: 97 %
STRESS PEAK DIAS BP: 58 MMHG
STRESS PEAK SYS BP: 172 MMHG
STRESS PERCENT HR ACHIEVED: 90 %
STRESS POST PEAK HR: 130 BPM
STRESS RATE PRESSURE PRODUCT: NORMAL BPM*MMHG
STRESS SR DUKE TREADMILL SCORE: 4
STRESS ST DEPRESSION: 0 MM
STRESS ST ELEVATION: 0 MM
STRESS TARGET HR: 145 BPM

## 2020-11-23 PROCEDURE — 93015 CV STRESS TEST SUPVJ I&R: CPT | Performed by: INTERNAL MEDICINE

## 2020-11-23 PROCEDURE — 93306 TTE W/DOPPLER COMPLETE: CPT | Performed by: INTERNAL MEDICINE

## 2020-11-23 PROCEDURE — 78452 HT MUSCLE IMAGE SPECT MULT: CPT | Performed by: INTERNAL MEDICINE

## 2020-11-23 PROCEDURE — A9500 TC99M SESTAMIBI: HCPCS | Performed by: INTERNAL MEDICINE

## 2020-11-23 RX ORDER — TETRAKIS(2-METHOXYISOBUTYLISOCYANIDE)COPPER(I) TETRAFLUOROBORATE 1 MG/ML
8.4 INJECTION, POWDER, LYOPHILIZED, FOR SOLUTION INTRAVENOUS ONCE
Status: COMPLETED | OUTPATIENT
Start: 2020-11-23 | End: 2020-11-23

## 2020-11-23 RX ORDER — TETRAKIS(2-METHOXYISOBUTYLISOCYANIDE)COPPER(I) TETRAFLUOROBORATE 1 MG/ML
25.7 INJECTION, POWDER, LYOPHILIZED, FOR SOLUTION INTRAVENOUS ONCE
Status: COMPLETED | OUTPATIENT
Start: 2020-11-23 | End: 2020-11-23

## 2020-11-23 RX ADMIN — TETRAKIS(2-METHOXYISOBUTYLISOCYANIDE)COPPER(I) TETRAFLUOROBORATE 8.4 MILLICURIE: 1 INJECTION, POWDER, LYOPHILIZED, FOR SOLUTION INTRAVENOUS at 12:40

## 2020-11-23 RX ADMIN — TETRAKIS(2-METHOXYISOBUTYLISOCYANIDE)COPPER(I) TETRAFLUOROBORATE 25.7 MILLICURIE: 1 INJECTION, POWDER, LYOPHILIZED, FOR SOLUTION INTRAVENOUS at 13:56

## 2020-11-24 LAB
ECHO AO ASC DIAM: 3.38 CM
ECHO AO ROOT DIAM: 3.48 CM
ECHO AV AREA PEAK VELOCITY: 3.02 CM2
ECHO AV AREA VTI: 2.6 CM2
ECHO AV AREA/BSA PEAK VELOCITY: 1.5 CM2/M2
ECHO AV AREA/BSA VTI: 1.3 CM2/M2
ECHO AV MEAN GRADIENT: 3.96 MMHG
ECHO AV PEAK GRADIENT: 6.84 MMHG
ECHO AV PEAK VELOCITY: 130.8 CM/S
ECHO AV VTI: 26.59 CM
ECHO LA AREA 4C: 22.52 CM2
ECHO LA MAJOR AXIS: 4.26 CM
ECHO LA MINOR AXIS: 2.13 CM
ECHO LA VOL 2C: 54.01 ML (ref 18–58)
ECHO LA VOL 4C: 72.13 ML (ref 18–58)
ECHO LA VOL BP: 65.56 ML (ref 18–58)
ECHO LA VOL/BSA BIPLANE: 32.84 ML/M2 (ref 16–28)
ECHO LA VOLUME INDEX A2C: 27.06 ML/M2 (ref 16–28)
ECHO LA VOLUME INDEX A4C: 36.14 ML/M2 (ref 16–28)
ECHO LV E' LATERAL VELOCITY: 7.64 CM/S
ECHO LV E' SEPTAL VELOCITY: 6.24 CM/S
ECHO LV INTERNAL DIMENSION DIASTOLIC: 3.68 CM (ref 4.2–5.9)
ECHO LV INTERNAL DIMENSION SYSTOLIC: 2.52 CM
ECHO LV IVSD: 1.26 CM (ref 0.6–1)
ECHO LV MASS 2D: 139.7 G (ref 88–224)
ECHO LV MASS INDEX 2D: 70 G/M2 (ref 49–115)
ECHO LV POSTERIOR WALL DIASTOLIC: 1.07 CM (ref 0.6–1)
ECHO LVOT DIAM: 2.28 CM
ECHO LVOT PEAK GRADIENT: 3.75 MMHG
ECHO LVOT PEAK VELOCITY: 96.77 CM/S
ECHO LVOT SV: 69.1 ML
ECHO LVOT VTI: 16.91 CM
ECHO MV A VELOCITY: 78.37 CM/S
ECHO MV E DECELERATION TIME (DT): 253.48 MS
ECHO MV E VELOCITY: 68.7 CM/S
ECHO MV E/A RATIO: 0.88
ECHO MV E/E' LATERAL: 8.99
ECHO MV E/E' RATIO (AVERAGED): 10
ECHO MV E/E' SEPTAL: 11.01
ECHO MV PRESSURE HALF TIME (PHT): 73.51 MS
ECHO RA AREA 4C: 15.16 CM2
ECHO RV TAPSE: 2.21 CM (ref 1.5–2)
ECHO TV REGURGITANT MAX VELOCITY: 251.01 CM/S
ECHO TV REGURGITANT PEAK GRADIENT: 25.2 MMHG
LA VOL DISK BP: 62.6 ML (ref 18–58)

## 2020-11-25 ENCOUNTER — TELEPHONE (OUTPATIENT)
Dept: CARDIOLOGY CLINIC | Age: 75
End: 2020-11-25

## 2020-11-25 NOTE — TELEPHONE ENCOUNTER
Called patient ID verified [id number] reviewed below results per Dr Nicolle Eng.     Patient verbalized understanding.      ----- Message from Mitzi Hui MD sent at 11/23/2020  6:39 PM EST -----  Normal stress nuc

## 2021-02-28 ENCOUNTER — HOSPITAL ENCOUNTER (EMERGENCY)
Age: 76
Discharge: HOME | End: 2021-02-28
Attending: EMERGENCY MEDICINE
Payer: MEDICARE

## 2021-02-28 ENCOUNTER — APPOINTMENT (OUTPATIENT)
Dept: GENERAL RADIOLOGY | Age: 76
End: 2021-02-28
Attending: EMERGENCY MEDICINE
Payer: MEDICARE

## 2021-02-28 VITALS
BODY MASS INDEX: 28.35 KG/M2 | RESPIRATION RATE: 18 BRPM | WEIGHT: 181 LBS | HEART RATE: 52 BPM | OXYGEN SATURATION: 93 % | SYSTOLIC BLOOD PRESSURE: 117 MMHG | DIASTOLIC BLOOD PRESSURE: 56 MMHG | TEMPERATURE: 98.5 F

## 2021-02-28 DIAGNOSIS — K20.90 ESOPHAGITIS: Primary | ICD-10-CM

## 2021-02-28 DIAGNOSIS — J02.9 ACUTE PHARYNGITIS, UNSPECIFIED ETIOLOGY: ICD-10-CM

## 2021-02-28 DIAGNOSIS — R13.19 ESOPHAGEAL DYSPHAGIA: ICD-10-CM

## 2021-02-28 LAB
ANION GAP SERPL CALC-SCNC: 10 MMOL/L (ref 5–15)
ATRIAL RATE: 52 BPM
BASOPHILS # BLD: 0 K/UL (ref 0–0.1)
BASOPHILS NFR BLD: 0 % (ref 0–1)
BUN SERPL-MCNC: 20 MG/DL (ref 6–20)
BUN/CREAT SERPL: 19 (ref 12–20)
CALCIUM SERPL-MCNC: 8.3 MG/DL (ref 8.5–10.1)
CALCULATED P AXIS, ECG09: 49 DEGREES
CALCULATED R AXIS, ECG10: 3 DEGREES
CALCULATED T AXIS, ECG11: 25 DEGREES
CHLORIDE SERPL-SCNC: 104 MMOL/L (ref 97–108)
CO2 SERPL-SCNC: 27 MMOL/L (ref 21–32)
CREAT SERPL-MCNC: 1.07 MG/DL (ref 0.7–1.3)
DEPRECATED S PYO AG THROAT QL EIA: NEGATIVE
DIAGNOSIS, 93000: NORMAL
DIFFERENTIAL METHOD BLD: ABNORMAL
EOSINOPHIL # BLD: 0 K/UL (ref 0–0.4)
EOSINOPHIL NFR BLD: 0 % (ref 0–7)
ERYTHROCYTE [DISTWIDTH] IN BLOOD BY AUTOMATED COUNT: 17.2 % (ref 11.5–14.5)
GLUCOSE SERPL-MCNC: 89 MG/DL (ref 65–100)
HCT VFR BLD AUTO: 43 % (ref 36.6–50.3)
HGB BLD-MCNC: 14 G/DL (ref 12.1–17)
IMM GRANULOCYTES # BLD AUTO: 0 K/UL
IMM GRANULOCYTES NFR BLD AUTO: 0 %
LIPASE SERPL-CCNC: 239 U/L (ref 73–393)
LYMPHOCYTES # BLD: 2.9 K/UL (ref 0.8–3.5)
LYMPHOCYTES NFR BLD: 15 % (ref 12–49)
MCH RBC QN AUTO: 24.3 PG (ref 26–34)
MCHC RBC AUTO-ENTMCNC: 32.6 G/DL (ref 30–36.5)
MCV RBC AUTO: 74.5 FL (ref 80–99)
METAMYELOCYTES NFR BLD MANUAL: 3 %
MONOCYTES # BLD: 1.2 K/UL (ref 0–1)
MONOCYTES NFR BLD: 6 % (ref 5–13)
NEUTS BAND NFR BLD MANUAL: 1 % (ref 0–6)
NEUTS SEG # BLD: 14.9 K/UL (ref 1.8–8)
NEUTS SEG NFR BLD: 75 % (ref 32–75)
NRBC # BLD: 0 K/UL (ref 0–0.01)
NRBC BLD-RTO: 0 PER 100 WBC
P-R INTERVAL, ECG05: 164 MS
PLATELET # BLD AUTO: 238 K/UL (ref 150–400)
PMV BLD AUTO: 11.6 FL (ref 8.9–12.9)
POTASSIUM SERPL-SCNC: 4.8 MMOL/L (ref 3.5–5.1)
Q-T INTERVAL, ECG07: 428 MS
QRS DURATION, ECG06: 80 MS
QTC CALCULATION (BEZET), ECG08: 398 MS
RBC # BLD AUTO: 5.77 M/UL (ref 4.1–5.7)
RBC MORPH BLD: ABNORMAL
SODIUM SERPL-SCNC: 141 MMOL/L (ref 136–145)
VENTRICULAR RATE, ECG03: 52 BPM
WBC # BLD AUTO: 19.6 K/UL (ref 4.1–11.1)

## 2021-02-28 PROCEDURE — 80048 BASIC METABOLIC PNL TOTAL CA: CPT

## 2021-02-28 PROCEDURE — 71045 X-RAY EXAM CHEST 1 VIEW: CPT

## 2021-02-28 PROCEDURE — 74011250637 HC RX REV CODE- 250/637: Performed by: EMERGENCY MEDICINE

## 2021-02-28 PROCEDURE — 74011000250 HC RX REV CODE- 250: Performed by: EMERGENCY MEDICINE

## 2021-02-28 PROCEDURE — 87880 STREP A ASSAY W/OPTIC: CPT

## 2021-02-28 PROCEDURE — 87147 CULTURE TYPE IMMUNOLOGIC: CPT

## 2021-02-28 PROCEDURE — 83690 ASSAY OF LIPASE: CPT

## 2021-02-28 PROCEDURE — 36415 COLL VENOUS BLD VENIPUNCTURE: CPT

## 2021-02-28 PROCEDURE — 85025 COMPLETE CBC W/AUTO DIFF WBC: CPT

## 2021-02-28 PROCEDURE — 99284 EMERGENCY DEPT VISIT MOD MDM: CPT

## 2021-02-28 PROCEDURE — 87070 CULTURE OTHR SPECIMN AEROBIC: CPT

## 2021-02-28 PROCEDURE — 93005 ELECTROCARDIOGRAM TRACING: CPT

## 2021-02-28 RX ORDER — FLUCONAZOLE 40 MG/ML
400 POWDER, FOR SUSPENSION ORAL DAILY
Qty: 140 ML | Refills: 0 | Status: SHIPPED | OUTPATIENT
Start: 2021-02-28 | End: 2021-03-14

## 2021-02-28 RX ORDER — FLUCONAZOLE 100 MG/1
400 TABLET ORAL ONCE
Status: COMPLETED | OUTPATIENT
Start: 2021-02-28 | End: 2021-02-28

## 2021-02-28 RX ADMIN — LIDOCAINE HYDROCHLORIDE 40 ML: 20 SOLUTION ORAL; TOPICAL at 09:23

## 2021-02-28 RX ADMIN — FLUCONAZOLE 400 MG: 100 TABLET ORAL at 10:11

## 2021-02-28 NOTE — ED TRIAGE NOTES
Pt ambulated to the treatment area with a steady gait. Pt states \"I had COVID Feb 7th tested positive I went to [facility] by ambulance that day. I was discharged on Feb. 18th. I was fine just a little winded and weak. But I started a sore throat 4 days ago I cant burp and I feel like there is a blockage in my esophagus. \" Pt denies recent fever denies increased shortness of breath. Denies chest pain. Pt is able to swallow oral secretions. Pt states I have been eating good and drinking fluids. \" Pt appears in no distress at this time.

## 2021-02-28 NOTE — ED NOTES
Plan of care and all test/meds ordered explained to pt. Good understanding and agreement with plan was verbalized. Pt medicated with GI Cocktail as ordered. Call bell in reach use explained.

## 2021-02-28 NOTE — ED NOTES
Pt was discharged and given instructions by Dr Coleen Mirza . Pt verbalized good understanding of all discharge instructions,prescriptions and F/U care. All questions answered. Pt in stable condition on discharge.

## 2021-02-28 NOTE — ED PROVIDER NOTES
13-year-old male with a history of acid reflux, atrial fibrillation on Eliquis, diabetes, hypercholesterolemia, hypothyroidism, peptic ulcer disease, TIA, and  COVID earlier this month is having a hard time swallowing and feels like he cannot eructate. He tells me his doctor tells him he is no longer contagious. His breathing is slowly getting better, but he still gets quite winded. The other day, just going to the mailbox required a rescue call to his wife because he could not make it back. The symptoms he is having today are different. He says he feels like he has to take very small bites and some of the liquids and solids are having a hard time going down. Food is going down and is not vomiting, but he feels like things are getting stuck. He feels like his voice is changed a little bit. There is a small amount of pain, but not severe. He feels like his voice is higher pitch than normal.  Again, his breathing is improving. No real chest pain. No nausea or vomiting or diarrhea. He noticed a bruised area of his left elbow this morning, but is not certain how this occurred. No significant pain or itching or burning there.            Past Medical History:   Diagnosis Date    Acid reflux     Cardiac dysrhythmia, unspecified 12/21/2011    Diabetes ([address]     GERD (gastroesophageal reflux disease)     Headache(784.0) 12/21/2011    Hypercholesterolemia     Hypothyroid     Ill-defined condition     COVID 2/21    Obesity     PUD (peptic ulcer disease)     TIA (transient ischemic attack)     2014       Past Surgical History:   Procedure Laterality Date    HX COLONOSCOPY  03/23/2018    HX GASTRECTOMY  1975    PUD    HX HEENT      left retroorbital tumor resection    HX MOHS PROCEDURES Right     right shoulder    HX TUMOR REMOVAL  2015    Left Eye         Family History:   Problem Relation Age of Onset    Diabetes Sister     Cancer Sister         lung    Diabetes Mother     Cancer Father brain    Diabetes Maternal Grandmother     Hypertension Neg Hx        Social History     Socioeconomic History    Marital status:      Spouse name: Not on file    Number of children: Not on file    Years of education: Not on file    Highest education level: Not on file   Occupational History    Not on file   Social Needs    Financial resource strain: Not on file    Food insecurity     Worry: Not on file     Inability: Not on file    Transportation needs     Medical: Not on file     Non-medical: Not on file   Tobacco Use    Smoking status: Never Smoker    Smokeless tobacco: Never Used   Substance and Sexual Activity    Alcohol use: No     Alcohol/week: 0.0 standard drinks    Drug use: No    Sexual activity: Never     Partners: Female   Lifestyle    Physical activity     Days per week: Not on file     Minutes per session: Not on file    Stress: Not on file   Relationships    Social connections     Talks on phone: Not on file     Gets together: Not on file     Attends Restorationist service: Not on file     Active member of club or organization: Not on file     Attends meetings of clubs or organizations: Not on file     Relationship status: Not on file    Intimate partner violence     Fear of current or ex partner: Not on file     Emotionally abused: Not on file     Physically abused: Not on file     Forced sexual activity: Not on file   Other Topics Concern    Not on file   Social History Narrative    Not on file         ALLERGIES: Ambien [zolpidem]    Review of Systems   Constitutional: Negative for fever. HENT: Negative for trouble swallowing. Eyes: Negative for visual disturbance. Respiratory: Positive for shortness of breath. Negative for cough. Cardiovascular: Negative for chest pain. Gastrointestinal: Negative for abdominal pain. Genitourinary: Negative for difficulty urinating. Musculoskeletal: Negative for gait problem. Skin: Positive for rash.    Neurological: Negative for headaches. Hematological: Does not bruise/bleed easily. Psychiatric/Behavioral: Negative for sleep disturbance. Vitals:    02/28/21 0810   BP: (!) 137/58   Pulse: (!) 52   Resp: 18   Temp: 98.5 °F (36.9 °C)   SpO2: 93%   Weight: 82.1 kg (181 lb)            Physical Exam  Constitutional:       Appearance: Normal appearance. HENT:      Head: Normocephalic. Nose: Nose normal.      Mouth/Throat:      Mouth: Mucous membranes are moist.        Comments: White ulcerations to the uvula and posterior pharynx  Small amount of white plaque on the tongue  Uvula is midline  No evidence of peritonsillar or retropharyngeal abscess  No trismus  Tolerating secretions well  Eyes:      Extraocular Movements: Extraocular movements intact. Conjunctiva/sclera: Conjunctivae normal.   Cardiovascular:      Rate and Rhythm: Normal rate. Pulmonary:      Effort: Pulmonary effort is normal. No respiratory distress. Abdominal:      General: Abdomen is flat. Musculoskeletal: Normal range of motion. Skin:     Findings: No rash. Neurological:      General: No focal deficit present. Mental Status: He is alert. Psychiatric:         Behavior: Behavior normal.          MDM  Number of Diagnoses or Management Options  Acute pharyngitis, unspecified etiology  Esophageal dysphagia  Esophagitis  Diagnosis management comments: Patient has a feeling of things getting stuck, but there does not seem to be any true obstruction or complete obstruction because he is tolerating secretions and able to eat solids and liquids. This could be pill esophagitis from his recent increase in medications from his Covid or this could be esophageal candidiasis or pharyngitis, related to Covid or not. Plan to treat with GI cocktail and check an x-ray and basic labs. If strep positive, will treat with antibiotics. If not, would consider fluconazole and GI follow-up.   He has been on steroids for some time, so not surprised with his white count of 19. He is not toxic appearing and is not septic. His heart rate is 50s to 60s and his respiratory rate is 18 and he has no fever. I stressed for him the importance of getting into see GI as soon as possible. For now he is tolerating liquids and solids, so it is not an emergency, but it could get that way.          Procedures

## 2021-03-01 ENCOUNTER — PATIENT OUTREACH (OUTPATIENT)
Dept: CASE MANAGEMENT | Age: 76
End: 2021-03-01

## 2021-03-01 NOTE — PROGRESS NOTES
Patient contacted regarding [id number] diagnosis\". Discussed COVID-19 related testing which was available at this time. Test results were positive. Patient informed of results, if available? Yes, patient says he was positive about two weeks ago and has been cleared by his provider. Had Covid 21, was cleared on 21. Than started a sore throat. Care Transition Nurse/ Ambulatory Care Manager contacted the patient by telephone to perform post discharge assessment. Call within 2 business days of discharge: Yes Verified name and  with patient as identifiers. Provided introduction to self, and explanation of the CTN/ACM role, and reason for call due to risk factors for infection and/or exposure to COVID-19. Symptoms reviewed with patient who verbalized the following symptoms: sore throat and unable to Burp. Due to no new or worsening symptoms encounter was not routed to provider for escalation. Discussed follow-up appointments. If no appointment was previously scheduled, appointment scheduling offered:  yes   [facility] follow up appointment(s): No future appointments. [facility] follow up appointment(s): Patient has follow up appointment on today, 3/1/21. Advance Care Planning:   Does patient have an Advance Directive:  reviewed and current. Patient has following risk factors of: diabetes. CTN/ACM reviewed discharge instructions, medical action plan and red flags such as increased shortness of breath, increasing fever and signs of decompensation with patient who verbalized understanding. Discussed exposure protocols and quarantine with CDC Guidelines What to do if you are sick with coronavirus disease 2019.  Patient was given an opportunity for questions and concerns. The patient agrees to contact the [facility] exposure line [phone], [facility] department [address]  ([phone] and PCP office for questions related to their healthcare.  CTN/ACM provided contact information for future needs. Reviewed and educated patient on any new and changed medications related to discharge diagnosis     Patient/family/caregiver given information for GetWell Loop and agrees to enroll yes  Patient's preferred e-mail:  [email or web address]  Patient's preferred phone [phone]  Based on Loop alert triggers, patient will be contacted by nurse care manager for worsening symptoms. Pt will be further monitored by COVID Loop Team based on severity of symptoms and risk factors.

## 2021-03-02 LAB
BACTERIA SPEC CULT: ABNORMAL
BACTERIA SPEC CULT: ABNORMAL
SERVICE CMNT-IMP: ABNORMAL

## 2021-05-02 ENCOUNTER — APPOINTMENT (OUTPATIENT)
Dept: GENERAL RADIOLOGY | Age: 76
DRG: 195 | End: 2021-05-02
Attending: EMERGENCY MEDICINE
Payer: MEDICARE

## 2021-05-02 ENCOUNTER — HOSPITAL ENCOUNTER (INPATIENT)
Age: 76
LOS: 3 days | Discharge: HOME | DRG: 195 | End: 2021-05-05
Attending: EMERGENCY MEDICINE | Admitting: INTERNAL MEDICINE
Payer: MEDICARE

## 2021-05-02 DIAGNOSIS — J18.9 PNEUMONIA OF BOTH LUNGS DUE TO INFECTIOUS ORGANISM, UNSPECIFIED PART OF LUNG: ICD-10-CM

## 2021-05-02 DIAGNOSIS — A41.9 SEPSIS WITHOUT ACUTE ORGAN DYSFUNCTION, DUE TO UNSPECIFIED ORGANISM (HCC): Primary | ICD-10-CM

## 2021-05-02 PROBLEM — I48.91 ATRIAL FIBRILLATION WITH RVR (HCC): Status: RESOLVED | Noted: 2017-01-06 | Resolved: 2021-05-02

## 2021-05-02 PROBLEM — D69.6 THROMBOCYTOPENIA (HCC): Status: ACTIVE | Noted: 2021-05-02

## 2021-05-02 PROBLEM — I48.91 A-FIB (HCC): Status: ACTIVE | Noted: 2021-05-02

## 2021-05-02 LAB
ALBUMIN SERPL-MCNC: 2.7 G/DL (ref 3.5–5)
ALBUMIN/GLOB SERPL: 0.7 {RATIO} (ref 1.1–2.2)
ALP SERPL-CCNC: 84 U/L (ref 45–117)
ALT SERPL-CCNC: 39 U/L (ref 12–78)
ANION GAP SERPL CALC-SCNC: 6 MMOL/L (ref 5–15)
APPEARANCE UR: CLEAR
AST SERPL-CCNC: 18 U/L (ref 15–37)
BACTERIA URNS QL MICRO: NEGATIVE /HPF
BASOPHILS # BLD: 0 K/UL (ref 0–0.1)
BASOPHILS NFR BLD: 0 % (ref 0–1)
BILIRUB SERPL-MCNC: 0.4 MG/DL (ref 0.2–1)
BILIRUB UR QL: NEGATIVE
BUN SERPL-MCNC: 12 MG/DL (ref 6–20)
BUN/CREAT SERPL: 14 (ref 12–20)
CALCIUM SERPL-MCNC: 8.1 MG/DL (ref 8.5–10.1)
CHLORIDE SERPL-SCNC: 109 MMOL/L (ref 97–108)
CO2 SERPL-SCNC: 25 MMOL/L (ref 21–32)
COLOR UR: NORMAL
CREAT SERPL-MCNC: 0.85 MG/DL (ref 0.7–1.3)
DIFFERENTIAL METHOD BLD: ABNORMAL
EOSINOPHIL # BLD: 0.1 K/UL (ref 0–0.4)
EOSINOPHIL NFR BLD: 1 % (ref 0–7)
EPITH CASTS URNS QL MICRO: NORMAL /LPF
ERYTHROCYTE [DISTWIDTH] IN BLOOD BY AUTOMATED COUNT: 18.8 % (ref 11.5–14.5)
GLOBULIN SER CALC-MCNC: 3.7 G/DL (ref 2–4)
GLUCOSE BLD STRIP.AUTO-MCNC: 116 MG/DL (ref 65–100)
GLUCOSE SERPL-MCNC: 117 MG/DL (ref 65–100)
GLUCOSE UR STRIP.AUTO-MCNC: NEGATIVE MG/DL
HCT VFR BLD AUTO: 40.9 % (ref 36.6–50.3)
HGB BLD-MCNC: 13.2 G/DL (ref 12.1–17)
HGB UR QL STRIP: NEGATIVE
IMM GRANULOCYTES # BLD AUTO: 0.2 K/UL (ref 0–0.04)
IMM GRANULOCYTES NFR BLD AUTO: 2 % (ref 0–0.5)
KETONES UR QL STRIP.AUTO: NEGATIVE MG/DL
LACTATE SERPL-SCNC: 1.9 MMOL/L (ref 0.4–2)
LEUKOCYTE ESTERASE UR QL STRIP.AUTO: NEGATIVE
LYMPHOCYTES # BLD: 2.1 K/UL (ref 0.8–3.5)
LYMPHOCYTES NFR BLD: 23 % (ref 12–49)
MCH RBC QN AUTO: 25.5 PG (ref 26–34)
MCHC RBC AUTO-ENTMCNC: 32.3 G/DL (ref 30–36.5)
MCV RBC AUTO: 79.1 FL (ref 80–99)
MONOCYTES # BLD: 0.7 K/UL (ref 0–1)
MONOCYTES NFR BLD: 7 % (ref 5–13)
NEUTS SEG # BLD: 5.9 K/UL (ref 1.8–8)
NEUTS SEG NFR BLD: 66 % (ref 32–75)
NITRITE UR QL STRIP.AUTO: NEGATIVE
NRBC # BLD: 0 K/UL (ref 0–0.01)
NRBC BLD-RTO: 0 PER 100 WBC
PH UR STRIP: 6.5 [PH] (ref 5–8)
PLATELET # BLD AUTO: 116 K/UL (ref 150–400)
PMV BLD AUTO: 10.6 FL (ref 8.9–12.9)
POTASSIUM SERPL-SCNC: 3.8 MMOL/L (ref 3.5–5.1)
PROCALCITONIN SERPL-MCNC: 0.06 NG/ML
PROT SERPL-MCNC: 6.4 G/DL (ref 6.4–8.2)
PROT UR STRIP-MCNC: NEGATIVE MG/DL
RBC # BLD AUTO: 5.17 M/UL (ref 4.1–5.7)
RBC #/AREA URNS HPF: NORMAL /HPF (ref 0–5)
SERVICE CMNT-IMP: ABNORMAL
SODIUM SERPL-SCNC: 140 MMOL/L (ref 136–145)
SP GR UR REFRACTOMETRY: 1.02 (ref 1–1.03)
TROPONIN I SERPL-MCNC: <0.05 NG/ML
TSH SERPL DL<=0.05 MIU/L-ACNC: 2.77 UIU/ML (ref 0.36–3.74)
UA: UC IF INDICATED,UAUC: NORMAL
UROBILINOGEN UR QL STRIP.AUTO: 0.2 EU/DL (ref 0.2–1)
WBC # BLD AUTO: 8.9 K/UL (ref 4.1–11.1)
WBC URNS QL MICRO: NORMAL /HPF (ref 0–4)

## 2021-05-02 PROCEDURE — 84145 PROCALCITONIN (PCT): CPT

## 2021-05-02 PROCEDURE — 0202U NFCT DS 22 TRGT SARS-COV-2: CPT

## 2021-05-02 PROCEDURE — 65270000029 HC RM PRIVATE

## 2021-05-02 PROCEDURE — 87449 NOS EACH ORGANISM AG IA: CPT

## 2021-05-02 PROCEDURE — 81001 URINALYSIS AUTO W/SCOPE: CPT

## 2021-05-02 PROCEDURE — 83605 ASSAY OF LACTIC ACID: CPT

## 2021-05-02 PROCEDURE — 93005 ELECTROCARDIOGRAM TRACING: CPT

## 2021-05-02 PROCEDURE — 74011000250 HC RX REV CODE- 250: Performed by: EMERGENCY MEDICINE

## 2021-05-02 PROCEDURE — 65660000000 HC RM CCU STEPDOWN

## 2021-05-02 PROCEDURE — 94664 DEMO&/EVAL PT USE INHALER: CPT

## 2021-05-02 PROCEDURE — 87899 AGENT NOS ASSAY W/OPTIC: CPT

## 2021-05-02 PROCEDURE — 36415 COLL VENOUS BLD VENIPUNCTURE: CPT

## 2021-05-02 PROCEDURE — 85025 COMPLETE CBC W/AUTO DIFF WBC: CPT

## 2021-05-02 PROCEDURE — 74011250637 HC RX REV CODE- 250/637: Performed by: INTERNAL MEDICINE

## 2021-05-02 PROCEDURE — 96374 THER/PROPH/DIAG INJ IV PUSH: CPT

## 2021-05-02 PROCEDURE — 84484 ASSAY OF TROPONIN QUANT: CPT

## 2021-05-02 PROCEDURE — 94640 AIRWAY INHALATION TREATMENT: CPT

## 2021-05-02 PROCEDURE — 82962 GLUCOSE BLOOD TEST: CPT

## 2021-05-02 PROCEDURE — 71045 X-RAY EXAM CHEST 1 VIEW: CPT

## 2021-05-02 PROCEDURE — 74011250636 HC RX REV CODE- 250/636: Performed by: INTERNAL MEDICINE

## 2021-05-02 PROCEDURE — 80053 COMPREHEN METABOLIC PANEL: CPT

## 2021-05-02 PROCEDURE — 87040 BLOOD CULTURE FOR BACTERIA: CPT

## 2021-05-02 PROCEDURE — 74011250637 HC RX REV CODE- 250/637: Performed by: EMERGENCY MEDICINE

## 2021-05-02 PROCEDURE — 84443 ASSAY THYROID STIM HORMONE: CPT

## 2021-05-02 PROCEDURE — 86738 MYCOPLASMA ANTIBODY: CPT

## 2021-05-02 PROCEDURE — 99285 EMERGENCY DEPT VISIT HI MDM: CPT

## 2021-05-02 PROCEDURE — 74011250636 HC RX REV CODE- 250/636: Performed by: EMERGENCY MEDICINE

## 2021-05-02 RX ORDER — MAGNESIUM SULFATE 100 %
4 CRYSTALS MISCELLANEOUS AS NEEDED
Status: DISCONTINUED | OUTPATIENT
Start: 2021-05-02 | End: 2021-05-05 | Stop reason: HOSPADM

## 2021-05-02 RX ORDER — IPRATROPIUM BROMIDE AND ALBUTEROL SULFATE 2.5; .5 MG/3ML; MG/3ML
3 SOLUTION RESPIRATORY (INHALATION)
Status: DISCONTINUED | OUTPATIENT
Start: 2021-05-02 | End: 2021-05-02 | Stop reason: CLARIF

## 2021-05-02 RX ORDER — SODIUM CHLORIDE 0.9 % (FLUSH) 0.9 %
5-40 SYRINGE (ML) INJECTION AS NEEDED
Status: DISCONTINUED | OUTPATIENT
Start: 2021-05-02 | End: 2021-05-05 | Stop reason: HOSPADM

## 2021-05-02 RX ORDER — SODIUM CHLORIDE 0.9 % (FLUSH) 0.9 %
5-10 SYRINGE (ML) INJECTION AS NEEDED
Status: DISCONTINUED | OUTPATIENT
Start: 2021-05-02 | End: 2021-05-05 | Stop reason: HOSPADM

## 2021-05-02 RX ORDER — POLYETHYLENE GLYCOL 3350 17 G/17G
17 POWDER, FOR SOLUTION ORAL DAILY PRN
Status: DISCONTINUED | OUTPATIENT
Start: 2021-05-02 | End: 2021-05-05 | Stop reason: HOSPADM

## 2021-05-02 RX ORDER — ACETAMINOPHEN 650 MG/1
650 SUPPOSITORY RECTAL
Status: DISCONTINUED | OUTPATIENT
Start: 2021-05-02 | End: 2021-05-05 | Stop reason: HOSPADM

## 2021-05-02 RX ORDER — SODIUM CHLORIDE 9 MG/ML
75 INJECTION, SOLUTION INTRAVENOUS CONTINUOUS
Status: DISCONTINUED | OUTPATIENT
Start: 2021-05-02 | End: 2021-05-05

## 2021-05-02 RX ORDER — SODIUM CHLORIDE 0.9 % (FLUSH) 0.9 %
5-40 SYRINGE (ML) INJECTION EVERY 8 HOURS
Status: DISCONTINUED | OUTPATIENT
Start: 2021-05-02 | End: 2021-05-05 | Stop reason: HOSPADM

## 2021-05-02 RX ORDER — METRONIDAZOLE 500 MG/100ML
500 INJECTION, SOLUTION INTRAVENOUS EVERY 12 HOURS
Status: DISCONTINUED | OUTPATIENT
Start: 2021-05-02 | End: 2021-05-05 | Stop reason: HOSPADM

## 2021-05-02 RX ORDER — PROMETHAZINE HYDROCHLORIDE 25 MG/1
12.5 TABLET ORAL
Status: DISCONTINUED | OUTPATIENT
Start: 2021-05-02 | End: 2021-05-05 | Stop reason: HOSPADM

## 2021-05-02 RX ORDER — VANCOMYCIN 2 GRAM/500 ML IN 0.9 % SODIUM CHLORIDE INTRAVENOUS
2000 ONCE
Status: COMPLETED | OUTPATIENT
Start: 2021-05-02 | End: 2021-05-03

## 2021-05-02 RX ORDER — INSULIN LISPRO 100 [IU]/ML
INJECTION, SOLUTION INTRAVENOUS; SUBCUTANEOUS
Status: DISCONTINUED | OUTPATIENT
Start: 2021-05-02 | End: 2021-05-05 | Stop reason: HOSPADM

## 2021-05-02 RX ORDER — ACETAMINOPHEN 325 MG/1
650 TABLET ORAL
Status: DISCONTINUED | OUTPATIENT
Start: 2021-05-02 | End: 2021-05-05 | Stop reason: HOSPADM

## 2021-05-02 RX ORDER — ACETAMINOPHEN 500 MG
1000 TABLET ORAL ONCE
Status: COMPLETED | OUTPATIENT
Start: 2021-05-02 | End: 2021-05-02

## 2021-05-02 RX ORDER — DEXTROSE 50 % IN WATER (D50W) INTRAVENOUS SYRINGE
12.5-25 AS NEEDED
Status: DISCONTINUED | OUTPATIENT
Start: 2021-05-02 | End: 2021-05-05 | Stop reason: HOSPADM

## 2021-05-02 RX ORDER — ONDANSETRON 2 MG/ML
4 INJECTION INTRAMUSCULAR; INTRAVENOUS
Status: DISCONTINUED | OUTPATIENT
Start: 2021-05-02 | End: 2021-05-05 | Stop reason: HOSPADM

## 2021-05-02 RX ADMIN — VANCOMYCIN HYDROCHLORIDE 2000 MG: 10 INJECTION, POWDER, LYOPHILIZED, FOR SOLUTION INTRAVENOUS at 22:54

## 2021-05-02 RX ADMIN — METHYLPREDNISOLONE SODIUM SUCCINATE 125 MG: 125 INJECTION, POWDER, FOR SOLUTION INTRAMUSCULAR; INTRAVENOUS at 21:50

## 2021-05-02 RX ADMIN — METRONIDAZOLE 500 MG: 500 INJECTION, SOLUTION INTRAVENOUS at 20:52

## 2021-05-02 RX ADMIN — CEFEPIME HYDROCHLORIDE 2 G: 2 INJECTION, POWDER, FOR SOLUTION INTRAVENOUS at 19:57

## 2021-05-02 RX ADMIN — Medication 10 ML: at 21:53

## 2021-05-02 RX ADMIN — ACETAMINOPHEN 1000 MG: 500 TABLET, FILM COATED ORAL at 17:42

## 2021-05-02 RX ADMIN — SODIUM CHLORIDE 75 ML/HR: 9 INJECTION, SOLUTION INTRAVENOUS at 21:50

## 2021-05-02 RX ADMIN — AZITHROMYCIN MONOHYDRATE 500 MG: 500 INJECTION, POWDER, LYOPHILIZED, FOR SOLUTION INTRAVENOUS at 20:46

## 2021-05-02 RX ADMIN — IPRATROPIUM BROMIDE AND ALBUTEROL 1 PUFF: 20; 100 SPRAY, METERED RESPIRATORY (INHALATION) at 21:25

## 2021-05-02 NOTE — ED TRIAGE NOTES
Patient arrives with complaint of SOB, cough, bilateral rib pain with cough, and headache. States had Covid-19 in February, hospitalized at [facility] and diagnosed with pneumonia. States saw PCP yesterday, and told has pneumonia.

## 2021-05-02 NOTE — ED NOTES
Pt in room 22 having EKG. Pt reports difficulty breathing. Dr Florecita Box to bedside. Pt sats 96% on RA. Pt placed on 2LO2NC for comfort.

## 2021-05-02 NOTE — ED PROVIDER NOTES
The history is provided by the patient. Shortness of Breath  This is a recurrent problem. The problem has been gradually worsening. Associated symptoms include a fever, cough, hemoptysis, wheezing and chest pain. Pertinent negatives include no vomiting and no abdominal pain. Precipitated by: recent Covid-19 and subsequent PNA. Treatments tried: Levofloxacin,  The treatment provided no relief. He has had prior ED visits. Associated medical issues comments: DM, cardiac dysrhythmia. Past Medical History:   Diagnosis Date    Acid reflux     Cardiac dysrhythmia, unspecified 12/21/2011    Diabetes ([address]     GERD (gastroesophageal reflux disease)     Headache(784.0) 12/21/2011    Hypercholesterolemia     Hypothyroid     Ill-defined condition     COVID 2/21    Obesity     PUD (peptic ulcer disease)     TIA (transient ischemic attack)     2014       Past Surgical History:   Procedure Laterality Date    HX COLONOSCOPY  03/23/2018    HX GASTRECTOMY  1975    PUD    HX HEENT      left retroorbital tumor resection    HX MOHS PROCEDURES Right     right shoulder    HX TUMOR REMOVAL  2015    Left Eye         Family History:   Problem Relation Age of Onset    Diabetes Sister     Cancer Sister         lung    Diabetes Mother     Cancer Father         brain    Diabetes Maternal Grandmother     Hypertension Neg Hx        Social History     Socioeconomic History    Marital status:      Spouse name: Not on file    Number of children: Not on file    Years of education: Not on file    Highest education level: Not on file   Occupational History    Not on file   Social Needs    Financial resource strain: Not on file    Food insecurity     Worry: Not on file     Inability: Not on file    Transportation needs     Medical: Not on file     Non-medical: Not on file   Tobacco Use    Smoking status: Never Smoker    Smokeless tobacco: Never Used   Substance and Sexual Activity    Alcohol use:  No Alcohol/week: 0.0 standard drinks    Drug use: No    Sexual activity: Never     Partners: Female   Lifestyle    Physical activity     Days per week: Not on file     Minutes per session: Not on file    Stress: Not on file   Relationships    Social connections     Talks on phone: Not on file     Gets together: Not on file     Attends Latter-day service: Not on file     Active member of club or organization: Not on file     Attends meetings of clubs or organizations: Not on file     Relationship status: Not on file    Intimate partner violence     Fear of current or ex partner: Not on file     Emotionally abused: Not on file     Physically abused: Not on file     Forced sexual activity: Not on file   Other Topics Concern    Not on file   Social History Narrative    Not on file         ALLERGIES: Ambien [zolpidem]    Review of Systems   Constitutional: Positive for fever. Negative for chills. Respiratory: Positive for cough, hemoptysis, shortness of breath and wheezing. Cardiovascular: Positive for chest pain. Gastrointestinal: Negative for abdominal pain, constipation, diarrhea and vomiting. Neurological: Negative for dizziness and light-headedness. All other systems reviewed and are negative. Vitals:    05/02/21 1641   BP: (!) 143/68   Pulse: (!) 112   Resp: 21   Temp: (!) 101.9 °F (38.8 °C)   SpO2: 96%            Physical Exam  Vitals signs and nursing note reviewed. Constitutional:       General: He is not in acute distress. Appearance: He is well-developed. He is ill-appearing. HENT:      Head: Normocephalic and atraumatic. Eyes:      Conjunctiva/sclera: Conjunctivae normal.      Pupils: Pupils are equal, round, and reactive to light. Neck:      Musculoskeletal: Normal range of motion. Cardiovascular:      Rate and Rhythm: Regular rhythm. Tachycardia present. Pulmonary:      Effort: Tachypnea and accessory muscle usage present.       Breath sounds: Examination of the left-lower field reveals rhonchi. Wheezing and rhonchi present. Abdominal:      General: There is no distension. Palpations: Abdomen is soft. Tenderness: There is no abdominal tenderness. Musculoskeletal: Normal range of motion. Skin:     General: Skin is warm and dry. Capillary Refill: Capillary refill takes less than 2 seconds. Neurological:      General: No focal deficit present. Mental Status: He is alert and oriented to person, place, and time. Psychiatric:         Mood and Affect: Mood is anxious. Behavior: Behavior normal.          MDM       Procedures     EKG performed at 4:24 PM  Sinus tachycardia, 114 bpm, premature atrial complexes, no ST changes, no T wave changes  EKG interpreted by Claudene Poche, MD     Pt presents with sepsis found to have PNA. Received abx and IV fluids with improvement in vital signs. Admitted to the hospital for additional management. DDX:  PNA, pericarditis, electrolyte abnormality, dehydration, influenza, UTI, pyelonephritis, gastroenteritis, diverticulitis, cholecystitis, COVID-19, Influenza, hypoxic respiratory failure    IMPRESSION:  1. Sepsis without acute organ dysfunction, due to unspecified organism ([address]    2. Pneumonia of both lungs due to infectious organism, unspecified part of lung      - Broad Spectrum Antibiotics ordered: Cefepime (already on Levofloxacin)  - Repeat lactic acid ordered for time n/a  - Re-assessment performed at time 7:32 PM and clinical condition stable.     - Hypotension or Lactic Acidosis present (SBP<90, MAP<65, Lactate >4): NO IVF:  Not indicated due to septic shock not present  - Persistent Hypotension despite IVF resuscitation: NO  Vasopressors: Not indicated due to septic shock not present    Total critical care time spent exclusive of procedures:  35 minutes    Claudene Poche, MD      Patient is being admitted to the hospital.  The results of their tests and reasons for their admission have been discussed with them and/or available family. They convey agreement and understanding for the need to be admitted and for their admission diagnosis. Consultation will be made now with the inpatient physician for hospitalization. Perfect Serve Consult for Admission  7:28 PM    ED Room Number: BH99/55  Patient Name and age: Anderson Gary 67 y.o.  male  Working Diagnosis:   1. Sepsis without acute organ dysfunction, due to unspecified organism ([facility] [address]    2.  Pneumonia of both lungs due to infectious organism, unspecified part of lung      COVID-19 Suspicion:  no  Sepsis present:  no (no severe sepsis)  Reassessment needed: no  Code Status:  Full Code  Readmission: no  Isolation Requirements:  no  Recommended Level of Care:  med/surg  Department:[facility] ED - [phone]  Other:  covid-19 in February

## 2021-05-03 ENCOUNTER — APPOINTMENT (OUTPATIENT)
Dept: NON INVASIVE DIAGNOSTICS | Age: 76
DRG: 195 | End: 2021-05-03
Attending: INTERNAL MEDICINE
Payer: MEDICARE

## 2021-05-03 LAB
ALBUMIN SERPL-MCNC: 2.5 G/DL (ref 3.5–5)
ALBUMIN/GLOB SERPL: 0.9 {RATIO} (ref 1.1–2.2)
ALP SERPL-CCNC: 80 U/L (ref 45–117)
ALT SERPL-CCNC: 36 U/L (ref 12–78)
ANION GAP SERPL CALC-SCNC: 8 MMOL/L (ref 5–15)
AST SERPL-CCNC: 20 U/L (ref 15–37)
ATRIAL RATE: 114 BPM
B PERT DNA SPEC QL NAA+PROBE: NOT DETECTED
BASOPHILS # BLD: 0 K/UL (ref 0–0.1)
BASOPHILS NFR BLD: 0 % (ref 0–1)
BILIRUB SERPL-MCNC: 0.5 MG/DL (ref 0.2–1)
BORDETELLA PARAPERTUSSIS PCR, BORPAR: NOT DETECTED
BUN SERPL-MCNC: 14 MG/DL (ref 6–20)
BUN/CREAT SERPL: 17 (ref 12–20)
C PNEUM DNA SPEC QL NAA+PROBE: NOT DETECTED
CALCIUM SERPL-MCNC: 7.5 MG/DL (ref 8.5–10.1)
CALCULATED P AXIS, ECG09: 25 DEGREES
CALCULATED R AXIS, ECG10: -7 DEGREES
CALCULATED T AXIS, ECG11: 31 DEGREES
CHLORIDE SERPL-SCNC: 108 MMOL/L (ref 97–108)
CO2 SERPL-SCNC: 22 MMOL/L (ref 21–32)
COMMENT, HOLDF: NORMAL
CREAT SERPL-MCNC: 0.82 MG/DL (ref 0.7–1.3)
D DIMER PPP FEU-MCNC: 0.38 MG/L FEU (ref 0–0.65)
DIAGNOSIS, 93000: NORMAL
DIFFERENTIAL METHOD BLD: ABNORMAL
ECHO AO ASC DIAM: 2.96 CM
ECHO AO ROOT DIAM: 4.11 CM
ECHO AR MAX VEL PISA: 297.39 CENTIMETER/SECOND
ECHO AV AREA PEAK VELOCITY: 2.44 CM2
ECHO AV AREA VTI: 2.72 CM2
ECHO AV AREA/BSA PEAK VELOCITY: 1.2 CM2/M2
ECHO AV AREA/BSA VTI: 1.4 CM2/M2
ECHO AV MEAN GRADIENT: 6.49 MMHG
ECHO AV PEAK GRADIENT: 11.76 MMHG
ECHO AV PEAK VELOCITY: 171.48 CM/S
ECHO AV REGURGITANT PHT: 489.53 MS
ECHO AV VTI: 37.35 CM
ECHO LA AREA 4C: 21.14 CM2
ECHO LA MAJOR AXIS: 4.07 CM
ECHO LA MINOR AXIS: 2.04 CM
ECHO LA VOL 2C: 66.76 ML (ref 18–58)
ECHO LA VOL 4C: 53.55 ML (ref 18–58)
ECHO LA VOL BP: 65.9 ML (ref 18–58)
ECHO LA VOL/BSA BIPLANE: 33.1 ML/M2 (ref 16–28)
ECHO LA VOLUME INDEX A2C: 33.53 ML/M2 (ref 16–28)
ECHO LA VOLUME INDEX A4C: 26.9 ML/M2 (ref 16–28)
ECHO LV E' LATERAL VELOCITY: 6.78 CENTIMETER/SECOND
ECHO LV E' SEPTAL VELOCITY: 8.66 CENTIMETER/SECOND
ECHO LV INTERNAL DIMENSION DIASTOLIC: 4.31 CM (ref 4.2–5.9)
ECHO LV INTERNAL DIMENSION SYSTOLIC: 3.05 CM
ECHO LV IVSD: 1.51 CM (ref 0.6–1)
ECHO LV MASS 2D: 255 G (ref 88–224)
ECHO LV MASS INDEX 2D: 128.1 G/M2 (ref 49–115)
ECHO LV POSTERIOR WALL DIASTOLIC: 1.46 CM (ref 0.6–1)
ECHO LVOT DIAM: 2.01 CM
ECHO LVOT PEAK GRADIENT: 6.98 MMHG
ECHO LVOT PEAK VELOCITY: 132.06 CM/S
ECHO LVOT SV: 101.5 ML
ECHO LVOT VTI: 32.05 CM
ECHO MV A VELOCITY: 89.75 CENTIMETER/SECOND
ECHO MV AREA PHT: 3.02 CM2
ECHO MV E DECELERATION TIME (DT): 250.82 MS
ECHO MV E VELOCITY: 92.26 CENTIMETER/SECOND
ECHO MV PRESSURE HALF TIME (PHT): 72.74 MS
ECHO PV MAX VELOCITY: 104.73 CM/S
ECHO PV PEAK INSTANTANEOUS GRADIENT SYSTOLIC: 4.45 MMHG
ECHO RV INTERNAL DIMENSION: 4.14 CM
ECHO RV TAPSE: 2.66 CM (ref 1.5–2)
ECHO TV REGURGITANT MAX VELOCITY: 303.01 CM/S
ECHO TV REGURGITANT PEAK GRADIENT: 36.73 MMHG
EOSINOPHIL # BLD: 0 K/UL (ref 0–0.4)
EOSINOPHIL NFR BLD: 0 % (ref 0–7)
ERYTHROCYTE [DISTWIDTH] IN BLOOD BY AUTOMATED COUNT: 18.3 % (ref 11.5–14.5)
FLUAV H1 2009 PAND RNA SPEC QL NAA+PROBE: NOT DETECTED
FLUAV H1 RNA SPEC QL NAA+PROBE: NOT DETECTED
FLUAV H3 RNA SPEC QL NAA+PROBE: NOT DETECTED
FLUAV SUBTYP SPEC NAA+PROBE: NOT DETECTED
FLUBV RNA SPEC QL NAA+PROBE: NOT DETECTED
GLOBULIN SER CALC-MCNC: 2.9 G/DL (ref 2–4)
GLUCOSE BLD STRIP.AUTO-MCNC: 206 MG/DL (ref 65–100)
GLUCOSE BLD STRIP.AUTO-MCNC: 252 MG/DL (ref 65–100)
GLUCOSE BLD STRIP.AUTO-MCNC: 297 MG/DL (ref 65–100)
GLUCOSE BLD STRIP.AUTO-MCNC: 343 MG/DL (ref 65–100)
GLUCOSE SERPL-MCNC: 304 MG/DL (ref 65–100)
HADV DNA SPEC QL NAA+PROBE: NOT DETECTED
HCOV 229E RNA SPEC QL NAA+PROBE: NOT DETECTED
HCOV HKU1 RNA SPEC QL NAA+PROBE: NOT DETECTED
HCOV NL63 RNA SPEC QL NAA+PROBE: NOT DETECTED
HCOV OC43 RNA SPEC QL NAA+PROBE: NOT DETECTED
HCT VFR BLD AUTO: 38.9 % (ref 36.6–50.3)
HGB BLD-MCNC: 11.9 G/DL (ref 12.1–17)
HMPV RNA SPEC QL NAA+PROBE: NOT DETECTED
HPIV1 RNA SPEC QL NAA+PROBE: NOT DETECTED
HPIV2 RNA SPEC QL NAA+PROBE: NOT DETECTED
HPIV3 RNA SPEC QL NAA+PROBE: NOT DETECTED
HPIV4 RNA SPEC QL NAA+PROBE: NOT DETECTED
IMM GRANULOCYTES # BLD AUTO: 0.1 K/UL (ref 0–0.04)
IMM GRANULOCYTES NFR BLD AUTO: 2 % (ref 0–0.5)
LA VOL DISK BP: 60.99 ML (ref 18–58)
LVOT MG: 4.63 MMHG
LYMPHOCYTES # BLD: 0.9 K/UL (ref 0.8–3.5)
LYMPHOCYTES NFR BLD: 16 % (ref 12–49)
M PNEUMO DNA SPEC QL NAA+PROBE: NOT DETECTED
MAGNESIUM SERPL-MCNC: 2 MG/DL (ref 1.6–2.4)
MCH RBC QN AUTO: 24.8 PG (ref 26–34)
MCHC RBC AUTO-ENTMCNC: 30.6 G/DL (ref 30–36.5)
MCV RBC AUTO: 81.2 FL (ref 80–99)
MONOCYTES # BLD: 0.1 K/UL (ref 0–1)
MONOCYTES NFR BLD: 2 % (ref 5–13)
NEUTS SEG # BLD: 4.5 K/UL (ref 1.8–8)
NEUTS SEG NFR BLD: 80 % (ref 32–75)
NRBC # BLD: 0 K/UL (ref 0–0.01)
NRBC BLD-RTO: 0 PER 100 WBC
P-R INTERVAL, ECG05: 144 MS
PLATELET # BLD AUTO: 106 K/UL (ref 150–400)
PMV BLD AUTO: 11.2 FL (ref 8.9–12.9)
POTASSIUM SERPL-SCNC: 4.2 MMOL/L (ref 3.5–5.1)
PROT SERPL-MCNC: 5.4 G/DL (ref 6.4–8.2)
Q-T INTERVAL, ECG07: 302 MS
QRS DURATION, ECG06: 74 MS
QTC CALCULATION (BEZET), ECG08: 416 MS
RBC # BLD AUTO: 4.79 M/UL (ref 4.1–5.7)
RSV RNA SPEC QL NAA+PROBE: NOT DETECTED
RV+EV RNA SPEC QL NAA+PROBE: NOT DETECTED
SAMPLES BEING HELD,HOLD: NORMAL
SARS-COV-2 PCR, COVPCR: NOT DETECTED
SERVICE CMNT-IMP: ABNORMAL
SODIUM SERPL-SCNC: 138 MMOL/L (ref 136–145)
VENTRICULAR RATE, ECG03: 114 BPM
WBC # BLD AUTO: 5.6 K/UL (ref 4.1–11.1)

## 2021-05-03 PROCEDURE — 93306 TTE W/DOPPLER COMPLETE: CPT

## 2021-05-03 PROCEDURE — 74011250637 HC RX REV CODE- 250/637: Performed by: INTERNAL MEDICINE

## 2021-05-03 PROCEDURE — 77010033678 HC OXYGEN DAILY

## 2021-05-03 PROCEDURE — 85025 COMPLETE CBC W/AUTO DIFF WBC: CPT

## 2021-05-03 PROCEDURE — 94760 N-INVAS EAR/PLS OXIMETRY 1: CPT

## 2021-05-03 PROCEDURE — 94640 AIRWAY INHALATION TREATMENT: CPT

## 2021-05-03 PROCEDURE — 85379 FIBRIN DEGRADATION QUANT: CPT

## 2021-05-03 PROCEDURE — 74011636637 HC RX REV CODE- 636/637: Performed by: INTERNAL MEDICINE

## 2021-05-03 PROCEDURE — 74011250636 HC RX REV CODE- 250/636: Performed by: INTERNAL MEDICINE

## 2021-05-03 PROCEDURE — 74011000250 HC RX REV CODE- 250: Performed by: INTERNAL MEDICINE

## 2021-05-03 PROCEDURE — 36415 COLL VENOUS BLD VENIPUNCTURE: CPT

## 2021-05-03 PROCEDURE — 94664 DEMO&/EVAL PT USE INHALER: CPT

## 2021-05-03 PROCEDURE — 83735 ASSAY OF MAGNESIUM: CPT

## 2021-05-03 PROCEDURE — 74011250637 HC RX REV CODE- 250/637: Performed by: FAMILY MEDICINE

## 2021-05-03 PROCEDURE — 93306 TTE W/DOPPLER COMPLETE: CPT | Performed by: INTERNAL MEDICINE

## 2021-05-03 PROCEDURE — 82962 GLUCOSE BLOOD TEST: CPT

## 2021-05-03 PROCEDURE — 65660000000 HC RM CCU STEPDOWN

## 2021-05-03 PROCEDURE — 80053 COMPREHEN METABOLIC PANEL: CPT

## 2021-05-03 RX ORDER — DILTIAZEM HYDROCHLORIDE 30 MG/1
60 TABLET, FILM COATED ORAL 2 TIMES DAILY
Status: DISCONTINUED | OUTPATIENT
Start: 2021-05-03 | End: 2021-05-05 | Stop reason: HOSPADM

## 2021-05-03 RX ORDER — ATORVASTATIN CALCIUM 10 MG/1
10 TABLET, FILM COATED ORAL DAILY
Status: DISCONTINUED | OUTPATIENT
Start: 2021-05-03 | End: 2021-05-05 | Stop reason: HOSPADM

## 2021-05-03 RX ORDER — METOPROLOL TARTRATE 25 MG/1
25 TABLET, FILM COATED ORAL 2 TIMES DAILY
COMMUNITY

## 2021-05-03 RX ORDER — CHLORPHENIRAMINE MALEATE 4 MG
TABLET ORAL
COMMUNITY

## 2021-05-03 RX ORDER — DILTIAZEM HYDROCHLORIDE 30 MG/1
30 TABLET, FILM COATED ORAL 2 TIMES DAILY
Status: DISCONTINUED | OUTPATIENT
Start: 2021-05-03 | End: 2021-05-03

## 2021-05-03 RX ORDER — DILTIAZEM HYDROCHLORIDE 60 MG/1
60 TABLET, FILM COATED ORAL 2 TIMES DAILY
COMMUNITY

## 2021-05-03 RX ORDER — LEVOTHYROXINE SODIUM 100 UG/1
100 TABLET ORAL
Status: DISCONTINUED | OUTPATIENT
Start: 2021-05-03 | End: 2021-05-05 | Stop reason: HOSPADM

## 2021-05-03 RX ORDER — METOPROLOL TARTRATE 25 MG/1
25 TABLET, FILM COATED ORAL EVERY 12 HOURS
Status: DISCONTINUED | OUTPATIENT
Start: 2021-05-03 | End: 2021-05-05 | Stop reason: HOSPADM

## 2021-05-03 RX ORDER — PANTOPRAZOLE SODIUM 40 MG/1
40 TABLET, DELAYED RELEASE ORAL
Status: DISCONTINUED | OUTPATIENT
Start: 2021-05-04 | End: 2021-05-05 | Stop reason: HOSPADM

## 2021-05-03 RX ADMIN — VANCOMYCIN HYDROCHLORIDE 1250 MG: 1.25 INJECTION, POWDER, LYOPHILIZED, FOR SOLUTION INTRAVENOUS at 11:04

## 2021-05-03 RX ADMIN — ATORVASTATIN CALCIUM 10 MG: 10 TABLET, FILM COATED ORAL at 11:08

## 2021-05-03 RX ADMIN — DILTIAZEM HYDROCHLORIDE 30 MG: 30 TABLET, FILM COATED ORAL at 09:16

## 2021-05-03 RX ADMIN — CEFEPIME HYDROCHLORIDE 2 G: 2 INJECTION, POWDER, FOR SOLUTION INTRAVENOUS at 20:30

## 2021-05-03 RX ADMIN — INSULIN LISPRO 4 UNITS: 100 INJECTION, SOLUTION INTRAVENOUS; SUBCUTANEOUS at 22:12

## 2021-05-03 RX ADMIN — LEVOTHYROXINE SODIUM 100 MCG: 0.1 TABLET ORAL at 09:17

## 2021-05-03 RX ADMIN — METRONIDAZOLE 500 MG: 500 INJECTION, SOLUTION INTRAVENOUS at 22:11

## 2021-05-03 RX ADMIN — Medication 10 ML: at 07:04

## 2021-05-03 RX ADMIN — CEFEPIME HYDROCHLORIDE 2 G: 2 INJECTION, POWDER, FOR SOLUTION INTRAVENOUS at 03:41

## 2021-05-03 RX ADMIN — INSULIN LISPRO 3 UNITS: 100 INJECTION, SOLUTION INTRAVENOUS; SUBCUTANEOUS at 17:56

## 2021-05-03 RX ADMIN — METOPROLOL TARTRATE 25 MG: 25 TABLET, FILM COATED ORAL at 11:08

## 2021-05-03 RX ADMIN — AZITHROMYCIN MONOHYDRATE 500 MG: 500 INJECTION, POWDER, LYOPHILIZED, FOR SOLUTION INTRAVENOUS at 20:31

## 2021-05-03 RX ADMIN — DILTIAZEM HYDROCHLORIDE 60 MG: 30 TABLET, FILM COATED ORAL at 22:16

## 2021-05-03 RX ADMIN — APIXABAN 5 MG: 5 TABLET, FILM COATED ORAL at 09:17

## 2021-05-03 RX ADMIN — IPRATROPIUM BROMIDE AND ALBUTEROL 1 PUFF: 20; 100 SPRAY, METERED RESPIRATORY (INHALATION) at 19:31

## 2021-05-03 RX ADMIN — METHYLPREDNISOLONE SODIUM SUCCINATE 40 MG: 40 INJECTION, POWDER, FOR SOLUTION INTRAMUSCULAR; INTRAVENOUS at 03:39

## 2021-05-03 RX ADMIN — Medication 10 ML: at 23:07

## 2021-05-03 RX ADMIN — METOPROLOL TARTRATE 25 MG: 25 TABLET, FILM COATED ORAL at 22:16

## 2021-05-03 RX ADMIN — METHYLPREDNISOLONE SODIUM SUCCINATE 40 MG: 40 INJECTION, POWDER, FOR SOLUTION INTRAMUSCULAR; INTRAVENOUS at 22:15

## 2021-05-03 RX ADMIN — INSULIN LISPRO 5 UNITS: 100 INJECTION, SOLUTION INTRAVENOUS; SUBCUTANEOUS at 11:40

## 2021-05-03 RX ADMIN — Medication 10 ML: at 14:58

## 2021-05-03 RX ADMIN — CEFEPIME HYDROCHLORIDE 2 G: 2 INJECTION, POWDER, FOR SOLUTION INTRAVENOUS at 11:40

## 2021-05-03 RX ADMIN — METHYLPREDNISOLONE SODIUM SUCCINATE 40 MG: 40 INJECTION, POWDER, FOR SOLUTION INTRAMUSCULAR; INTRAVENOUS at 14:57

## 2021-05-03 RX ADMIN — IPRATROPIUM BROMIDE AND ALBUTEROL 1 PUFF: 20; 100 SPRAY, METERED RESPIRATORY (INHALATION) at 01:54

## 2021-05-03 RX ADMIN — IPRATROPIUM BROMIDE AND ALBUTEROL 1 PUFF: 20; 100 SPRAY, METERED RESPIRATORY (INHALATION) at 14:39

## 2021-05-03 RX ADMIN — METRONIDAZOLE 500 MG: 500 INJECTION, SOLUTION INTRAVENOUS at 09:15

## 2021-05-03 RX ADMIN — SODIUM CHLORIDE 75 ML/HR: 9 INJECTION, SOLUTION INTRAVENOUS at 16:16

## 2021-05-03 RX ADMIN — VANCOMYCIN HYDROCHLORIDE 1250 MG: 1.25 INJECTION, POWDER, LYOPHILIZED, FOR SOLUTION INTRAVENOUS at 23:06

## 2021-05-03 RX ADMIN — APIXABAN 5 MG: 5 TABLET, FILM COATED ORAL at 22:15

## 2021-05-03 RX ADMIN — INSULIN LISPRO 5 UNITS: 100 INJECTION, SOLUTION INTRAVENOUS; SUBCUTANEOUS at 09:15

## 2021-05-03 RX ADMIN — IPRATROPIUM BROMIDE AND ALBUTEROL 1 PUFF: 20; 100 SPRAY, METERED RESPIRATORY (INHALATION) at 08:07

## 2021-05-03 NOTE — CONSULTS
[facility]  Pulmonary, Critical Care, and Sleep Medicine    Initial Patient Consult    Name: Rohit Lang MRN: [id number]   : 1945 Hospital: [address] [facility]   Date: 5/3/2021        IMPRESSION:   · Admitted with sepsis   · pna - ? Cap   · dypnea since covid - worse since last Friday - better today   · Reported hypoxia with exertion on 2 liters  ( to 80's )   · S/p covid 2021 ( ) evidence of post covid syndrome- neg rvp and covid this admit   · Chronic anticoagulation for afib   ·  hx of afib   ·  dm       RECOMMENDATIONS:   · Agree with comprehensive care -   · Fu  Urine ag and mycoplasma that are pd   · Fu bc   · o2 and wean as able   ·  continue currently inhaler- he was on dulera 200 mg at  Home which he said helped. Can  Go back to that if he prefers   · Par  Will be glad to see him post dc if he and primary like for post covid - looks like he had good mgt by Dr. Aaron Gonzales. · Check d dimer - if high despite the eliquis and if sob persists, would have low threshold for cta for pte   · Can wean steroids as tolerated. I planned to do but  This med is  Linked to insulin . Will defer to attending. Subjective: This patient has been seen and evaluated at the request of Dr. rashi Soler for  Bojorquez / Cathy Simons and hypoxia and concern for sepsis . Nae Rodrigues Patient is a 67 y.o. male  Who denies hx of pulm problems. He was admitted for several days with covid pna in FEb at . He was not seen by par  At the time. Pt says he thinks he was dc too soon but has per his report been followed closely by his primary md , Dr. Aaron Gonzales. He has noted persistent sob. Last Friday he noted increased bojorquez/sob and a hacking cough . He was started  On levaquin per his primary . He presented to er yesterday with  Fever , cxr showed bilateral infiltrates and it was reported that his sats dropped to 80's on nc o2 with exertion . Overnight he feels better with decreased temp and decreased cough . He had just returned from the bathroom to the chair and off o2 sats were 93 %  On my check . He has been broadly covered with ab.      NO cp, no sinus currently . NO increased lower ext edema or pain . NO wheezing. Past Medical History:   Diagnosis Date    Acid reflux     Cardiac dysrhythmia, unspecified 12/21/2011    Diabetes ([facility] [address]     GERD (gastroesophageal reflux disease)     Headache(784.0) 12/21/2011    Hypercholesterolemia     Hypothyroid     Ill-defined condition     COVID 2/21    Obesity     PUD (peptic ulcer disease)     TIA (transient ischemic attack)     2014      Past Surgical History:   Procedure Laterality Date    HX COLONOSCOPY  03/23/2018    HX GASTRECTOMY  1975    PUD    HX HEENT      left retroorbital tumor resection    HX MOHS PROCEDURES Right     right shoulder    HX TUMOR REMOVAL  2015    Left Eye      Prior to Admission medications    Medication Sig Start Date End Date Taking? Authorizing Provider   clotrimazole (LOTRIMIN) 1 % topical cream Apply  to affected area two (2) times daily as needed for Skin Irritation or Itching. Yes Provider, Historical   dilTIAZem IR (CARDIZEM) 60 mg tablet Take 60 mg by mouth two (2) times a day. Yes Provider, Historical   metoprolol tartrate (LOPRESSOR) 25 mg tablet Take 25 mg by mouth two (2) times a day. Yes Provider, Historical   cholecalciferol, vitamin D3, (VITAMIN D3) 2,000 unit tab Take 1 Tab by mouth daily. Yes Provider, Historical   omega 3-DHA-EPA-fish oil 1,000 mg (120 mg-180 mg) capsule Take 1 Cap by mouth daily. Yes Provider, Historical   clobetasol (TEMOVATE) 0.05 % external solution Apply  to affected area two (2) times daily as needed (Rash/itching). 1/29/18  Yes Provider, Historical   multivitamin (ONE A DAY) tablet Take 1 Tab by mouth daily. Yes Provider, Historical   ascorbic acid, vitamin C, (VITAMIN C) 500 mg tablet Take 1,000 mg by mouth daily.    Yes Provider, Historical   apixaban (ELIQUIS) 5 mg tablet Take 1 Tab by mouth two (2) times a day. Indications: PREVENT THROMBOEMBOLISM IN CHRONIC ATRIAL FIBRILLATION 5/37/83  Yes Tobi Sánchez MD   levothyroxine (SYNTHROID) 100 mcg tablet Take 1 Tab by mouth Daily (before breakfast). Indications: hypothyroidism 5/10/88  Yes Tobi Sánchez MD   atorvastatin (LIPITOR) 10 mg tablet Take 1 Tab by mouth daily. Indications: hypercholesterolemia 9/54/06  Yes Tobi Sánchez MD   omeprazole (PRILOSEC) 20 mg capsule Take 1 Cap by mouth daily. 1/41/16  Yes Tobi Sánchez MD   metFORMIN (GLUCOPHAGE) 1,000 mg tablet Take 1 Tab by mouth two (2) times daily (with meals). Indications: type 2 diabetes mellitus 2/27/79  Yes Tobi Sánchez MD   aspirin 81 mg chewable tablet Take 1 Tab by mouth daily.  1/8/17  Yes Naida Wolff MD   ;evaquin   dulera 200mg   Allergies   Allergen Reactions    Ambien [Zolpidem] Other (comments)     Sleep walking  Doing things like cooking but he was not awake      Social History     Tobacco Use    Smoking status: Never Smoker    Smokeless tobacco: Never Used   Substance Use Topics    Alcohol use: No     Alcohol/week: 0.0 standard drinks    remote tobacco - quit in 1970's      Family History   Problem Relation Age of Onset    Diabetes Sister     Cancer Sister         lung    Diabetes Mother     Cancer Father         brain    Diabetes Maternal Grandmother     Hypertension Neg Hx         Current Facility-Administered Medications   Medication Dose Route Frequency    apixaban (ELIQUIS) tablet 5 mg  5 mg Oral BID    levothyroxine (SYNTHROID) tablet 100 mcg  100 mcg Oral ACB    dilTIAZem IR (CARDIZEM) tablet 60 mg  60 mg Oral BID    metoprolol tartrate (LOPRESSOR) tablet 25 mg  25 mg Oral Q12H    atorvastatin (LIPITOR) tablet 10 mg  10 mg Oral DAILY    [START ON 5/4/2021] pantoprazole (PROTONIX) tablet 40 mg  40 mg Oral ACB    sodium chloride (NS) flush 5-40 mL  5-40 mL IntraVENous Q8H    insulin lispro (HUMALOG) injection   SubCUTAneous AC&HS    metroNIDAZOLE (FLAGYL) IVPB premix 500 mg  500 mg IntraVENous Q12H    azithromycin (ZITHROMAX) 500 mg in 0.9% sodium chloride 250 mL (VIAL-MATE)  500 mg IntraVENous Q24H    methylPREDNISolone (PF) (SOLU-MEDROL) injection 40 mg  40 mg IntraVENous Q8H    ipratropium-albuterol (COMBIVENT RESPIMAT) 20 mcg-100 mcg inhalation spray  1 Puff Inhalation Q6H RT    0.9% sodium chloride infusion  75 mL/hr IntraVENous CONTINUOUS    cefepime (MAXIPIME) 2 g in sterile water (preservative free) 10 mL IV syringe  2 g IntraVENous Q8H    vancomycin (VANCOCIN) 1,250 mg in 0.9% sodium chloride 250 mL (VIAL-MATE)  1,250 mg IntraVENous Q12H       Review of Systems:  A comprehensive review of systems was negative except for that written in the HPI. Objective:   Vital Signs:    Visit Vitals  /71 (BP 1 Location: Left arm, BP Patient Position: At rest)   Pulse 82   Temp 97.6 °F (36.4 °C)   Resp 17   Ht 5' 8\" (1.727 m)   Wt 85.7 kg (188 lb 15 oz)   SpO2 94%   BMI 28.73 kg/m²       O2 Device: Nasal cannula   O2 Flow Rate (L/min): 2 l/min   Temp (24hrs), Av.2 °F (37.3 °C), Min:97.6 °F (36.4 °C), Max:101.9 °F (38.8 °C)       Intake/Output:   Last shift:      701 - 1900  In: 607.5 [I.V.:607.5]  Out: -   Last 3 shifts: 1901 -  0700  In: 87.5 [I.V.:87.5]  Out: -     Intake/Output Summary (Last 24 hours) at 5/3/2021 1022  Last data filed at 5/3/2021 0706  Gross per 24 hour   Intake 695 ml   Output    Net 695 ml      Physical Exam:   General:  Alert, cooperative, no distress, appears stated age. Heavy set    Head:  Normocephalic, without obvious abnormality, atraumatic. Eyes:  Conjunctivae/corneas clear. PERRL, EOMs intact. Nose: Nares normal.    Throat: Not examined . Neck: Supple, symmetrical, trachea midline, no adenopathy, thyroid: no enlargment/tenderness/nodules   Back:   Symmetric, no curvature.  ROM normal.   Lungs:   Minimal rales posterior bases and good airflow    Chest wall:  No tenderness or deformity. Heart:  Regular rate and rhythm, S1, S2 normal, no murmur, click, rub or gallop. Abdomen:   Soft, non-tender. Bowel sounds normal. No masses,  No organomegaly. Extremities: Extremities normal, atraumatic, no cyanosis or edema. Pulses:  not examined    Skin: Skin color, texture, turgor normal. No rashes or lesions   Lymph nodes: Cervical, supraclavicular nodes normal.   Neurologic: Grossly nonfocal     Data review:     Recent Results (from the past 24 hour(s))   CULTURE, BLOOD    Collection Time: 05/02/21  5:02 PM    Specimen: Blood   Result Value Ref Range    Special Requests: NO SPECIAL REQUESTS      Culture result: NO GROWTH AFTER 14 HOURS     METABOLIC PANEL, COMPREHENSIVE    Collection Time: 05/02/21  5:02 PM   Result Value Ref Range    Sodium 140 136 - 145 mmol/L    Potassium 3.8 3.5 - 5.1 mmol/L    Chloride 109 (H) 97 - 108 mmol/L    CO2 25 21 - 32 mmol/L    Anion gap 6 5 - 15 mmol/L    Glucose 117 (H) 65 - 100 mg/dL    BUN 12 6 - 20 MG/DL    Creatinine 0.85 0.70 - 1.30 MG/DL    BUN/Creatinine ratio 14 12 - 20      GFR est AA >60 >60 ml/min/1.73m2    GFR est non-AA >60 >60 ml/min/1.73m2    Calcium 8.1 (L) 8.5 - 10.1 MG/DL    Bilirubin, total 0.4 0.2 - 1.0 MG/DL    ALT (SGPT) 39 12 - 78 U/L    AST (SGOT) 18 15 - 37 U/L    Alk.  phosphatase 84 45 - 117 U/L    Protein, total 6.4 6.4 - 8.2 g/dL    Albumin 2.7 (L) 3.5 - 5.0 g/dL    Globulin 3.7 2.0 - 4.0 g/dL    A-G Ratio 0.7 (L) 1.1 - 2.2     CBC WITH AUTOMATED DIFF    Collection Time: 05/02/21  5:02 PM   Result Value Ref Range    WBC 8.9 4.1 - 11.1 K/uL    RBC 5.17 4.10 - 5.70 M/uL    HGB 13.2 12.1 - 17.0 g/dL    HCT 40.9 36.6 - 50.3 %    MCV 79.1 (L) 80.0 - 99.0 FL    MCH 25.5 (L) 26.0 - 34.0 PG    MCHC 32.3 30.0 - 36.5 g/dL    RDW 18.8 (H) 11.5 - 14.5 %    PLATELET 958 (L) 000 - 400 K/uL    MPV 10.6 8.9 - 12.9 FL    NRBC 0.0 0  WBC    ABSOLUTE NRBC 0.00 0.00 - 0.01 K/uL    NEUTROPHILS 66 32 - 75 %    LYMPHOCYTES 23 12 - 49 %    MONOCYTES 7 5 - 13 %    EOSINOPHILS 1 0 - 7 %    BASOPHILS 0 0 - 1 %    IMMATURE GRANULOCYTES 2 (H) 0.0 - 0.5 %    ABS. NEUTROPHILS 5.9 1.8 - 8.0 K/UL    ABS. LYMPHOCYTES 2.1 0.8 - 3.5 K/UL    ABS. MONOCYTES 0.7 0.0 - 1.0 K/UL    ABS. EOSINOPHILS 0.1 0.0 - 0.4 K/UL    ABS. BASOPHILS 0.0 0.0 - 0.1 K/UL    ABS. IMM.  GRANS. 0.2 (H) 0.00 - 0.04 K/UL    DF AUTOMATED     TROPONIN I    Collection Time: 05/02/21  5:02 PM   Result Value Ref Range    Troponin-I, Qt. <0.05 <0.05 ng/mL   LACTIC ACID    Collection Time: 05/02/21  5:02 PM   Result Value Ref Range    Lactic acid 1.9 0.4 - 2.0 MMOL/L   TSH 3RD GENERATION    Collection Time: 05/02/21  5:02 PM   Result Value Ref Range    TSH 2.77 0.36 - 3.74 uIU/mL   CULTURE, BLOOD    Collection Time: 05/02/21  5:15 PM    Specimen: Blood   Result Value Ref Range    Special Requests: NO SPECIAL REQUESTS      Culture result: NO GROWTH AFTER 14 HOURS     URINALYSIS W/ REFLEX CULTURE    Collection Time: 05/02/21  5:47 PM    Specimen: Urine   Result Value Ref Range    Color YELLOW/STRAW      Appearance CLEAR CLEAR      Specific gravity 1.017 1.003 - 1.030      pH (UA) 6.5 5.0 - 8.0      Protein Negative NEG mg/dL    Glucose Negative NEG mg/dL    Ketone Negative NEG mg/dL    Bilirubin Negative NEG      Blood Negative NEG      Urobilinogen 0.2 0.2 - 1.0 EU/dL    Nitrites Negative NEG      Leukocyte Esterase Negative NEG      WBC 0-4 0 - 4 /hpf    RBC 0-5 0 - 5 /hpf    Epithelial cells FEW FEW /lpf    Bacteria Negative NEG /hpf    UA:UC IF INDICATED CULTURE NOT INDICATED BY UA RESULT CNI     PROCALCITONIN    Collection Time: 05/02/21  5:47 PM   Result Value Ref Range    Procalcitonin 0.06 ng/mL   RESPIRATORY VIRUS PANEL W/COVID-19, PCR    Collection Time: 05/02/21  8:32 PM    Specimen: Nasopharyngeal   Result Value Ref Range    Adenovirus Not detected NOTD      Coronavirus 229E Not detected NOTD      Coronavirus HKU1 Not detected NOTD      Coronavirus CVNL63 Not detected NOTD      Coronavirus OC43 Not detected NOTD      Metapneumovirus Not detected NOTD      Rhinovirus and Enterovirus Not detected NOTD      Influenza A Not detected NOTD      Influenza A, subtype H1 Not detected NOTD      Influenza A, subtype H3 Not detected NOTD      INFLUENZA A H1N1 PCR Not detected NOTD      Influenza B Not detected NOTD      Parainfluenza 1 Not detected NOTD      Parainfluenza 2 Not detected NOTD      Parainfluenza 3 Not detected NOTD      Parainfluenza virus 4 Not detected NOTD      RSV by PCR Not detected NOTD      B. parapertussis, PCR Not detected NOTD      Bordetella pertussis - PCR Not detected NOTD      Chlamydophila pneumoniae DNA, QL, PCR Not detected NOTD      Mycoplasma pneumoniae DNA, QL, PCR Not detected NOTD      SARS-CoV-2, PCR Not detected NOTD     GLUCOSE, POC    Collection Time: 05/02/21  9:56 PM   Result Value Ref Range    Glucose (POC) 116 (H) 65 - 100 mg/dL    Performed by SAVANNAH CASTROEW    METABOLIC PANEL, COMPREHENSIVE    Collection Time: 05/03/21  3:50 AM   Result Value Ref Range    Sodium 138 136 - 145 mmol/L    Potassium 4.2 3.5 - 5.1 mmol/L    Chloride 108 97 - 108 mmol/L    CO2 22 21 - 32 mmol/L    Anion gap 8 5 - 15 mmol/L    Glucose 304 (H) 65 - 100 mg/dL    BUN 14 6 - 20 MG/DL    Creatinine 0.82 0.70 - 1.30 MG/DL    BUN/Creatinine ratio 17 12 - 20      GFR est AA >60 >60 ml/min/1.73m2    GFR est non-AA >60 >60 ml/min/1.73m2    Calcium 7.5 (L) 8.5 - 10.1 MG/DL    Bilirubin, total 0.5 0.2 - 1.0 MG/DL    ALT (SGPT) 36 12 - 78 U/L    AST (SGOT) 20 15 - 37 U/L    Alk.  phosphatase 80 45 - 117 U/L    Protein, total 5.4 (L) 6.4 - 8.2 g/dL    Albumin 2.5 (L) 3.5 - 5.0 g/dL    Globulin 2.9 2.0 - 4.0 g/dL    A-G Ratio 0.9 (L) 1.1 - 2.2     MAGNESIUM    Collection Time: 05/03/21  3:50 AM   Result Value Ref Range    Magnesium 2.0 1.6 - 2.4 mg/dL   CBC WITH AUTOMATED DIFF    Collection Time: 05/03/21  3:50 AM   Result Value Ref Range    WBC 5.6 4.1 - 11.1 K/uL    RBC 4. 79 4.10 - 5.70 M/uL    HGB 11.9 (L) 12.1 - 17.0 g/dL    HCT 38.9 36.6 - 50.3 %    MCV 81.2 80.0 - 99.0 FL    MCH 24.8 (L) 26.0 - 34.0 PG    MCHC 30.6 30.0 - 36.5 g/dL    RDW 18.3 (H) 11.5 - 14.5 %    PLATELET 224 (L) 583 - 400 K/uL    MPV 11.2 8.9 - 12.9 FL    NRBC 0.0 0  WBC    ABSOLUTE NRBC 0.00 0.00 - 0.01 K/uL    NEUTROPHILS 80 (H) 32 - 75 %    LYMPHOCYTES 16 12 - 49 %    MONOCYTES 2 (L) 5 - 13 %    EOSINOPHILS 0 0 - 7 %    BASOPHILS 0 0 - 1 %    IMMATURE GRANULOCYTES 2 (H) 0.0 - 0.5 %    ABS. NEUTROPHILS 4.5 1.8 - 8.0 K/UL    ABS. LYMPHOCYTES 0.9 0.8 - 3.5 K/UL    ABS. MONOCYTES 0.1 0.0 - 1.0 K/UL    ABS. EOSINOPHILS 0.0 0.0 - 0.4 K/UL    ABS. BASOPHILS 0.0 0.0 - 0.1 K/UL    ABS. IMM. GRANS. 0.1 (H) 0.00 - 0.04 K/UL    DF AUTOMATED     GLUCOSE, POC    Collection Time: 05/03/21  7:03 AM   Result Value Ref Range    Glucose (POC) 252 (H) 65 - 100 mg/dL    Performed by DocbookMD        Imaging:  I have personally reviewed the patients radiographs and have reviewed the reports:  Compared cxr to one from feb - bilateral infiltrates - better than feb - has cxr's in April ordered by primary md - I do not seen in [facility] system.          Sree Real MD

## 2021-05-03 NOTE — ED NOTES
TRANSFER - OUT REPORT:    Verbal report given to Aaron Staley RN(name) on Michelle Reyna  being transferred to 4th floor(unit) for routine progression of care       Report consisted of patients Situation, Background, Assessment and   Recommendations(SBAR). Information from the following report(s) SBAR, Kardex, ED Summary, [facility] and Recent Results was reviewed with the receiving nurse. Lines:   Peripheral IV 05/02/21 Right Forearm (Active)   Site Assessment Clean, dry, & intact 05/02/21 1720   Phlebitis Assessment 0 05/02/21 1720   Infiltration Assessment 0 05/02/21 1720   Dressing Status Clean, dry, & intact 05/02/21 1720   Dressing Type Transparent 05/02/21 1720   Hub Color/Line Status Pink;Flushed 05/02/21 1720   Action Taken Blood drawn 05/02/21 1720        Opportunity for questions and clarification was provided.       Patient transported with:   Monitor  Registered Nurse

## 2021-05-03 NOTE — ACP (ADVANCE CARE PLANNING)
Advance Care Planning     Advance Care Planning Activator (Inpatient)  Conversation Note      Date of ACP Conversation: 05/03/21     Conversation Conducted with:   Patient with Decision Making Capacity    ACP Activator: [address] Decision Maker:    Current Designated Health Care Decision Maker:     Primary Decision Maker: Shey Dang - Spouse - [phone]  Click here to complete [address] including selection of the Healthcare Decision Maker Relationship (ie \"Primary\")  Today we documented Decision Maker(s) consistent with Legal Next of Kin hierarchy. Care Preferences    Ventilation: \"If you were in your present state of health and suddenly became very ill and were unable to breathe on your own, what would your preference be about the use of a ventilator (breathing machine) if it were available to you? \"      Would the patient desire the use of a ventilator (breathing machine): Yes    \"If your health worsens and it becomes clear that your chance of recovery is unlikely, what would your preference be about the use of a ventilator (breathing machine) if it were available to you? \"     Would the patient desire the use of a ventilator (breathing machine)? Yes      Resuscitation  \"CPR works best to restart the heart when there is a sudden event, like a heart attack, in someone who is otherwise healthy. Unfortunately, CPR does not typically restart the heart for people who have serious health conditions or who are very sick. \"    \"In the event your heart stopped as a result of an underlying serious health condition, would you want attempts to be made to restart your heart (answer \"yes\" for attempt to resuscitate) or would you prefer a natural death (answer \"no\" for do not attempt to resuscitate)? \" Yes      [] Yes  [x] No   Educated Patient / Billie Neighbors regarding differences between Advance Directives and portable DNR orders.     Length of ACP Conversation in minutes: 10 min    Conversation Outcomes:  [x] ACP discussion completed  [] Existing advance directive reviewed with patient; no changes to patient's previously recorded wishes     [] New Advance Directive completed   [] Portable Do Not Resuscitate prepared for Provider review and signature  [] POLST/POST/MOLST/MOST prepared for Provider review and signature      Follow-up plan:    [] Schedule follow-up conversation to continue planning  [] Referred individual to Provider for additional questions/concerns   [] Advised patient/agent/surrogate to review completed ACP document and update if needed with changes in condition, patient preferences or care setting     [] This note routed to one or more involved healthcare providers              _____________________________________  Danyell Luna, [address] [place] [address] Drive Management  5/3/2021   11:02 AM

## 2021-05-03 NOTE — PROGRESS NOTES
Daily Progress Note: 5/3/2021  Albert Fatima MD    Assessment/Plan:   Sepsis/Febrile/tachycardia/ tachypnea [facility]) (5/2/2021): Likely due to pneumonia. Chest x-ray with evidence of pneumonia. Check pneumonia serologies. Follow blood cultures. Lactic acid within normal limits. Broad-spectrum antibiotics including IV vancomycin, cefepime Flagyl and azithromycin. He has completed both Ghislaine Peterson vaccinations.      Hypoxia/dyspnea/cough: Likely due to pneumonia. Patient on full dose anticoagulation which he states he is compliant with doubt PE. POA patient desaturates into the 80s with movement on 2 L nasal cannula patient. Incentive spirometry. IV steroids. Duo nebs. ABG as needed. Consult Pulm.      Pneumonia: plan as above.      Atrial fibrillation: History of A. fib; currently in sinus. Continue home diltiazem as BP permits. Continue home Eliquis. Gentle IVF.     Thrombocytopenia: likely due to sepsis. Monitor on eliquis.      Hx of COVID: Recently treated for Covid pneumonia in February.     Acquired hypothyroidism (1/14/2016): Check TSH. Continue home Synthroid.     Mixed hyperlipidemia (10/2/2010): Continue with statin pending med rec completion.      GERD (gastroesophageal reflux disease) (1/14/2016): Continue PPI     Diabetes mellitus type 2, controlled ([facility] (1/14/2016): Monitor on steroids. Hold home metformin. Insulin sliding scale.     Actinic keratosis (11/15/2016)/ Atopic dermatitis (12/15/2016): Outpatient follow-up.      Problem List:  Problem List as of 5/3/2021 Date Reviewed: 5/2/2021          Codes Class Noted - Resolved    RESOLVED: Diarrhea ICD-10-CM: R19.7  ICD-9-CM: 787.91 Chronic 12/21/2011 - 1/6/2017        Sepsis ([facility] ICD-10-CM: A41.9  ICD-9-CM: 038.9, 995.91  5/2/2021 - Present        A-fib ([facility] ICD-10-CM: I48.91  ICD-9-CM: 427.31  5/2/2021 - Present        Thrombocytopenia ([facility] ICD-10-CM: D69.6  ICD-9-CM: 287.5  5/2/2021 - Present        Atopic dermatitis (Chronic) ICD-10-CM: L20.9  ICD-9-CM: 691.8  12/15/2016 - Present        Actinic keratosis (Chronic) ICD-10-CM: L57.0  ICD-9-CM: 702.0  11/15/2016 - Present        Skin cancer of nose (Chronic) ICD-10-CM: C44.301  ICD-9-CM: 173.30  4/18/2016 - Present        Spondylosis of lumbar joint (Chronic) ICD-10-CM: M47.816  ICD-9-CM: 721.3  3/14/2016 - Present        Polyposis coli (Chronic) ICD-10-CM: D12.6  ICD-9-CM: 211.3  2/19/2016 - Present    Overview Addendum 3/14/2016  4:15 PM by MD Dr. Juliana Clark 2016             Chronic back pain (Chronic) ICD-10-CM: M54.9, G89.29  ICD-9-CM: 724.5, 338.29  1/14/2016 - Present        Acquired hypothyroidism (Chronic) ICD-10-CM: E03.9  ICD-9-CM: 244.9  1/14/2016 - Present        GERD (gastroesophageal reflux disease) (Chronic) ICD-10-CM: K21.9  ICD-9-CM: 530.81  1/14/2016 - Present        Diabetes mellitus type 2, controlled ([facility] (Chronic) ICD-10-CM: E11.9  ICD-9-CM: 250.00  1/14/2016 - Present        Obese (Chronic) ICD-10-CM: E66.9  ICD-9-CM: 278.00  12/21/2011 - Present        Mixed hyperlipidemia (Chronic) ICD-10-CM: E78.2  ICD-9-CM: 272.2  10/2/2010 - Present        RESOLVED: Atrial fibrillation with RVR ([facility] ICD-10-CM: I48.91  ICD-9-CM: 427.31  1/6/2017 - 5/2/2021        RESOLVED: Laceration ICD-10-CM: [id number]  ICD-9-CM: 879.8  10/21/2016 - 1/6/2017        RESOLVED: Chest pain ICD-10-CM: R07.9  ICD-9-CM: 786.50  4/29/2016 - 1/6/2017        RESOLVED: Cardiac dysrhythmia ICD-10-CM: I49.9  ICD-9-CM: 427.9  12/21/2011 - 1/6/2017        RESOLVED: OLPNXNFQ(079.8) ICD-10-CM: R51  ICD-9-CM: 784.0  12/21/2011 - 1/6/2017        RESOLVED: TIA (transient ischemic attack) ICD-10-CM: G45.9  ICD-9-CM: 435.9  12/21/2011 - 1/6/2017        RESOLVED: Insomnia, unspecified ICD-10-CM: G47.00  ICD-9-CM: 780.52  6/17/2010 - 1/6/2017            Subjective:    67 y.o. male with a past medical history of A. fib on Eliquis, diabetes, hypothyroidism, hyperlipidemia and GERD who is admitted with sepsis. Mr. Leana Tan was diagnosed with Covid pneumonia in February and has suffered from shortness of breath since then. However, he states this past Friday his shortness of breath became worse. He endorses an associated nonproductive cough, wheezing, chills, heart palpitations and blurry vision. He denies any current chest pain or syncope. He states that his primary care doctor recently started him on a course of Levaquin and he recently completed a steroid taper. During our encounter, he desaturates to the 80s on movement in the bed. (Dr Elena Shoemaker)    5/3:  Overall reports he is breathing much better. Still with cough and congestion and \"feeling weak all over. \"  No other complaints. He has completed both Moderna Covid vaccinations. Tmax 101. Tnow 98.7. WBC ok. Review of Systems:   A comprehensive review of systems was negative except for that written in the HPI. Objective:   Physical Exam:   Visit Vitals  BP (!) 150/71 (BP 1 Location: Left arm, BP Patient Position: At rest)   Pulse 81   Temp 98.7 °F (37.1 °C)   Resp 17   Ht 5' 8\" (1.727 m)   Wt 85.7 kg (188 lb 15 oz)   SpO2 93%   BMI 28.73 kg/m²    O2 Flow Rate (L/min): 2 l/min O2 Device: Nasal cannula    Temp (24hrs), Av.5 °F (37.5 °C), Min:98.5 °F (36.9 °C), Max:101.9 °F (38.8 °C)    701 - 1900  In: 607.5 [I.V.:607.5]  Out: -    1901 -  07  In: 87.5 [I.V.:87.5]  Out: -     General:  Alert, cooperative, no distress, appears stated age. Head:  Normocephalic, without obvious abnormality, atraumatic. Eyes:  Conjunctivae/corneas clear. PERRL, EOMs intact. Nose: Nares normal. Septum midline. Mucosa normal. No drainage or sinus tenderness. Throat: Lips, mucosa, and tongue moist..   Neck: Supple, symmetrical, trachea midline, no adenopathy, thyroid: no enlargement/tenderness/nodules, no carotid bruit and no JVD. No accessory muscle use. Back:   Symmetric, no curvature. ROM normal. No CVA tenderness. Lungs:   A few scattered rhonchi bilaterally. Chest wall:  No tenderness or deformity. Heart:  Regular rate and rhythm, S1, S2 normal, no murmur, click, rub or gallop. Abdomen:   Soft, non-tender. Bowel sounds normal. No masses,  No organomegaly. Extremities: no cyanosis or edema. No calf tenderness or cords. Pulses: 2+ and symmetric all extremities. Skin: Skin color, texture, turgor normal. No rashes or lesions   Neurologic: CNII-XII intact. Alert and oriented X 3. Fine motor of hands and fingers normal.   equal.  No cogwheeling or rigidity. Gait not tested at this time. Sensation grossly normal to touch. Gross motor of extremities normal.       Data Review:       Recent Days:  Recent Labs     05/03/21  0350 05/02/21  1702   WBC 5.6 8.9   HGB 11.9* 13.2   HCT 38.9 40.9   * 116*     Recent Labs     05/03/21  0350 05/02/21  1702    140   K 4.2 3.8    109*   CO2 22 25   * 117*   BUN 14 12   CREA 0.82 0.85   CA 7.5* 8.1*   MG 2.0  --    ALB 2.5* 2.7*   TBILI 0.5 0.4   ALT 36 39     No results for input(s): PH, PCO2, PO2, HCO3, FIO2 in the last 72 hours.     24 Hour Results:  Recent Results (from the past 24 hour(s))   CULTURE, BLOOD    Collection Time: 05/02/21  5:02 PM    Specimen: Blood   Result Value Ref Range    Special Requests: NO SPECIAL REQUESTS      Culture result: NO GROWTH AFTER 12 HOURS     METABOLIC PANEL, COMPREHENSIVE    Collection Time: 05/02/21  5:02 PM   Result Value Ref Range    Sodium 140 136 - 145 mmol/L    Potassium 3.8 3.5 - 5.1 mmol/L    Chloride 109 (H) 97 - 108 mmol/L    CO2 25 21 - 32 mmol/L    Anion gap 6 5 - 15 mmol/L    Glucose 117 (H) 65 - 100 mg/dL    BUN 12 6 - 20 MG/DL    Creatinine 0.85 0.70 - 1.30 MG/DL    BUN/Creatinine ratio 14 12 - 20      GFR est AA >60 >60 ml/min/1.73m2    GFR est non-AA >60 >60 ml/min/1.73m2    Calcium 8.1 (L) 8.5 - 10.1 MG/DL    Bilirubin, total 0.4 0.2 - 1.0 MG/DL    ALT (SGPT) 39 12 - 78 U/L    AST (SGOT) 18 15 - 37 U/L    Alk. phosphatase 84 45 - 117 U/L    Protein, total 6.4 6.4 - 8.2 g/dL    Albumin 2.7 (L) 3.5 - 5.0 g/dL    Globulin 3.7 2.0 - 4.0 g/dL    A-G Ratio 0.7 (L) 1.1 - 2.2     CBC WITH AUTOMATED DIFF    Collection Time: 05/02/21  5:02 PM   Result Value Ref Range    WBC 8.9 4.1 - 11.1 K/uL    RBC 5.17 4.10 - 5.70 M/uL    HGB 13.2 12.1 - 17.0 g/dL    HCT 40.9 36.6 - 50.3 %    MCV 79.1 (L) 80.0 - 99.0 FL    MCH 25.5 (L) 26.0 - 34.0 PG    MCHC 32.3 30.0 - 36.5 g/dL    RDW 18.8 (H) 11.5 - 14.5 %    PLATELET 902 (L) 142 - 400 K/uL    MPV 10.6 8.9 - 12.9 FL    NRBC 0.0 0  WBC    ABSOLUTE NRBC 0.00 0.00 - 0.01 K/uL    NEUTROPHILS 66 32 - 75 %    LYMPHOCYTES 23 12 - 49 %    MONOCYTES 7 5 - 13 %    EOSINOPHILS 1 0 - 7 %    BASOPHILS 0 0 - 1 %    IMMATURE GRANULOCYTES 2 (H) 0.0 - 0.5 %    ABS. NEUTROPHILS 5.9 1.8 - 8.0 K/UL    ABS. LYMPHOCYTES 2.1 0.8 - 3.5 K/UL    ABS. MONOCYTES 0.7 0.0 - 1.0 K/UL    ABS. EOSINOPHILS 0.1 0.0 - 0.4 K/UL    ABS. BASOPHILS 0.0 0.0 - 0.1 K/UL    ABS. IMM.  GRANS. 0.2 (H) 0.00 - 0.04 K/UL    DF AUTOMATED     TROPONIN I    Collection Time: 05/02/21  5:02 PM   Result Value Ref Range    Troponin-I, Qt. <0.05 <0.05 ng/mL   LACTIC ACID    Collection Time: 05/02/21  5:02 PM   Result Value Ref Range    Lactic acid 1.9 0.4 - 2.0 MMOL/L   TSH 3RD GENERATION    Collection Time: 05/02/21  5:02 PM   Result Value Ref Range    TSH 2.77 0.36 - 3.74 uIU/mL   CULTURE, BLOOD    Collection Time: 05/02/21  5:15 PM    Specimen: Blood   Result Value Ref Range    Special Requests: NO SPECIAL REQUESTS      Culture result: NO GROWTH AFTER 12 HOURS     URINALYSIS W/ REFLEX CULTURE    Collection Time: 05/02/21  5:47 PM    Specimen: Urine   Result Value Ref Range    Color YELLOW/STRAW      Appearance CLEAR CLEAR      Specific gravity 1.017 1.003 - 1.030      pH (UA) 6.5 5.0 - 8.0      Protein Negative NEG mg/dL    Glucose Negative NEG mg/dL    Ketone Negative NEG mg/dL    Bilirubin Negative NEG      Blood Negative NEG      Urobilinogen 0.2 0.2 - 1.0 EU/dL    Nitrites Negative NEG      Leukocyte Esterase Negative NEG      WBC 0-4 0 - 4 /hpf    RBC 0-5 0 - 5 /hpf    Epithelial cells FEW FEW /lpf    Bacteria Negative NEG /hpf    UA:UC IF INDICATED CULTURE NOT INDICATED BY UA RESULT CNI     PROCALCITONIN    Collection Time: 05/02/21  5:47 PM   Result Value Ref Range    Procalcitonin 0.06 ng/mL   GLUCOSE, POC    Collection Time: 05/02/21  9:56 PM   Result Value Ref Range    Glucose (POC) 116 (H) 65 - 100 mg/dL    Performed by SAVANNAH YUE    METABOLIC PANEL, COMPREHENSIVE    Collection Time: 05/03/21  3:50 AM   Result Value Ref Range    Sodium 138 136 - 145 mmol/L    Potassium 4.2 3.5 - 5.1 mmol/L    Chloride 108 97 - 108 mmol/L    CO2 22 21 - 32 mmol/L    Anion gap 8 5 - 15 mmol/L    Glucose 304 (H) 65 - 100 mg/dL    BUN 14 6 - 20 MG/DL    Creatinine 0.82 0.70 - 1.30 MG/DL    BUN/Creatinine ratio 17 12 - 20      GFR est AA >60 >60 ml/min/1.73m2    GFR est non-AA >60 >60 ml/min/1.73m2    Calcium 7.5 (L) 8.5 - 10.1 MG/DL    Bilirubin, total 0.5 0.2 - 1.0 MG/DL    ALT (SGPT) 36 12 - 78 U/L    AST (SGOT) 20 15 - 37 U/L    Alk.  phosphatase 80 45 - 117 U/L    Protein, total 5.4 (L) 6.4 - 8.2 g/dL    Albumin 2.5 (L) 3.5 - 5.0 g/dL    Globulin 2.9 2.0 - 4.0 g/dL    A-G Ratio 0.9 (L) 1.1 - 2.2     MAGNESIUM    Collection Time: 05/03/21  3:50 AM   Result Value Ref Range    Magnesium 2.0 1.6 - 2.4 mg/dL   CBC WITH AUTOMATED DIFF    Collection Time: 05/03/21  3:50 AM   Result Value Ref Range    WBC 5.6 4.1 - 11.1 K/uL    RBC 4.79 4.10 - 5.70 M/uL    HGB 11.9 (L) 12.1 - 17.0 g/dL    HCT 38.9 36.6 - 50.3 %    MCV 81.2 80.0 - 99.0 FL    MCH 24.8 (L) 26.0 - 34.0 PG    MCHC 30.6 30.0 - 36.5 g/dL    RDW 18.3 (H) 11.5 - 14.5 %    PLATELET 547 (L) 686 - 400 K/uL    MPV 11.2 8.9 - 12.9 FL    NRBC 0.0 0  WBC    ABSOLUTE NRBC 0.00 0.00 - 0.01 K/uL    NEUTROPHILS 80 (H) 32 - 75 %    LYMPHOCYTES 16 12 - 49 %    MONOCYTES 2 (L) 5 - 13 % EOSINOPHILS 0 0 - 7 %    BASOPHILS 0 0 - 1 %    IMMATURE GRANULOCYTES 2 (H) 0.0 - 0.5 %    ABS. NEUTROPHILS 4.5 1.8 - 8.0 K/UL    ABS. LYMPHOCYTES 0.9 0.8 - 3.5 K/UL    ABS. MONOCYTES 0.1 0.0 - 1.0 K/UL    ABS. EOSINOPHILS 0.0 0.0 - 0.4 K/UL    ABS. BASOPHILS 0.0 0.0 - 0.1 K/UL    ABS. IMM.  GRANS. 0.1 (H) 0.00 - 0.04 K/UL    DF AUTOMATED     GLUCOSE, POC    Collection Time: 05/03/21  7:03 AM   Result Value Ref Range    Glucose (POC) 252 (H) 65 - 100 mg/dL    Performed by Norberto Houser        Medications reviewed  Current Facility-Administered Medications   Medication Dose Route Frequency    apixaban (ELIQUIS) tablet 5 mg  5 mg Oral BID    dilTIAZem IR (CARDIZEM) tablet 30 mg  30 mg Oral BID    levothyroxine (SYNTHROID) tablet 100 mcg  100 mcg Oral ACB    sodium chloride (NS) flush 5-10 mL  5-10 mL IntraVENous PRN    sodium chloride (NS) flush 5-40 mL  5-40 mL IntraVENous Q8H    sodium chloride (NS) flush 5-40 mL  5-40 mL IntraVENous PRN    acetaminophen (TYLENOL) tablet 650 mg  650 mg Oral Q6H PRN    Or    acetaminophen (TYLENOL) suppository 650 mg  650 mg Rectal Q6H PRN    polyethylene glycol (MIRALAX) packet 17 g  17 g Oral DAILY PRN    promethazine (PHENERGAN) tablet 12.5 mg  12.5 mg Oral Q6H PRN    Or    ondansetron (ZOFRAN) injection 4 mg  4 mg IntraVENous Q6H PRN    insulin lispro (HUMALOG) injection   SubCUTAneous AC&HS    glucose chewable tablet 16 g  4 Tab Oral PRN    dextrose (D50W) injection syrg 12.5-25 g  12.5-25 g IntraVENous PRN    glucagon (GLUCAGEN) injection 1 mg  1 mg IntraMUSCular PRN    metroNIDAZOLE (FLAGYL) IVPB premix 500 mg  500 mg IntraVENous Q12H    azithromycin (ZITHROMAX) 500 mg in 0.9% sodium chloride 250 mL (VIAL-MATE)  500 mg IntraVENous Q24H    methylPREDNISolone (PF) (SOLU-MEDROL) injection 40 mg  40 mg IntraVENous Q8H    ipratropium-albuterol (COMBIVENT RESPIMAT) 20 mcg-100 mcg inhalation spray  1 Puff Inhalation Q6H RT    0.9% sodium chloride infusion  75 mL/hr IntraVENous CONTINUOUS    cefepime (MAXIPIME) 2 g in sterile water (preservative free) 10 mL IV syringe  2 g IntraVENous Q8H    vancomycin (VANCOCIN) 1,250 mg in 0.9% sodium chloride 250 mL (VIAL-MATE)  1,250 mg IntraVENous Q12H       Care Plan discussed with: Patient/Family and Nurse  Total time spent with patient: 30 minutes.   Sindhu Denney MD

## 2021-05-03 NOTE — PROGRESS NOTES
[facility] Dosing Services: Antimicrobial Stewardship Progress Note    Consult for antibiotic dosing of Vancomycin by Dr. Sampson Tierney  Pharmacist reviewed antibiotic appropriateness for 67year old , male  for indication of VAP  Day of Therapy: 1    Plan:  Vancomycin therapy:  Start Vancomycin therapy, with loading dose of 2,000 mg IV on 5/2/2021. Follow with maintenance dose of 1,250 mg IV every 12 hours. Dose calculated to approximate a therapeutic trough of 15-20 mcg/mL. Pharmacist will follow daily and will make changes to dose and/or frequency based on clinical status. Serum Creatinine     Lab Results   Component Value Date/Time    Creatinine 0.85 05/02/2021 05:02 PM    Creatinine (POC) 0.9 10/30/2020 03:07 PM       Creatinine Clearance Estimated Creatinine Clearance: 78.7 mL/min (based on SCr of 0.85 mg/dL).      Procalcitonin    Lab Results   Component Value Date/Time    Procalcitonin 0.06 05/02/2021 05:47 PM      Temp   99 °F (37.2 °C)    WBC   Lab Results   Component Value Date/Time    WBC 8.9 05/02/2021 05:02 PM       H/H   Lab Results   Component Value Date/Time    HGB 13.2 05/02/2021 05:02 PM      Platelets Lab Results   Component Value Date/Time    PLATELET 315 (L) 04/26/9180 05:02 PM      Pharmacist: Bin Freedman

## 2021-05-03 NOTE — H&P
[facility] [facility]  [address] [facility], [address] [place] [address]  [phone]    Admission History and Physical      NAME:  Alonzo Johansen   :   1945   MRN:  [id number]     PCP:  Rosalina Weiss MD     Date/Time of service:  2021  8:01 PM        Subjective:     CHIEF COMPLAINT: Shortness of breath    HISTORY OF PRESENT ILLNESS:     Mr. Yu Pelaez is a 67 y.o. male with a past medical history of A. fib on Eliquis, diabetes, hypothyroidism, hyperlipidemia and GERD who is admitted with sepsis. Mr. Yu Pelaez was diagnosed with Covid pneumonia in February and has suffered from shortness of breath since then. However, he states this past Friday his shortness of breath became worse. He endorses an associated nonproductive cough, wheezing, chills, heart palpitations and blurry vision. He denies any current chest pain or syncope. He states that his primary care doctor recently started him on a course of Levaquin and he recently completed a steroid taper. During our encounter, he desaturates to the 80s on movement in the bed. Allergies   Allergen Reactions    Ambien [Zolpidem] Other (comments)     Sleep walking  Doing things like cooking but he was not awake       Prior to Admission medications    Medication Sig Start Date End Date Taking? Authorizing Provider   HYDROcodone-acetaminophen (NORCO)  mg tablet TAKE 1 TABLET BY MOUTH TWICE A DAY AS NEEDED FOR PAIN  Patient not taking: Reported on 11/10/2020 9/39/85   Donny Ceja MD   rosuvastatin (CRESTOR) 10 mg tablet Take 10 mg by mouth nightly. Provider, Historical   cholecalciferol, vitamin D3, (VITAMIN D3) 2,000 unit tab Take  by mouth. Provider, Historical   omega 3-DHA-EPA-fish oil 1,000 mg (120 mg-180 mg) capsule Take 1 Cap by mouth daily.     Provider, Historical   triamcinolone acetonide (KENALOG) 0.1 % topical cream APPLY TWICE A DAY AS DIRECTED 18   Provider, Historical   clobetasol (TEMOVATE) 0.05 % external solution Apply  to affected area two (2) times a day. 1/29/18   Provider, Historical   multivitamin (ONE A DAY) tablet Take 1 Tab by mouth daily. Provider, Historical   ascorbic acid, vitamin C, (VITAMIN C) 500 mg tablet Take 1,000 mg by mouth daily. Provider, Historical   clobetasol (TEMOVATE) 0.05 % topical cream Apply  to affected area two (2) times a day. 7/9/97   Pham Childers MD   clotrimazole (LOTRIMIN) 1 % topical cream Apply  to affected area two (2) times a day. 4/9/51   Pham Childers MD   apixaban (ELIQUIS) 5 mg tablet Take 1 Tab by mouth two (2) times a day. Indications: PREVENT THROMBOEMBOLISM IN CHRONIC ATRIAL FIBRILLATION 3/13/28   Pham Childers MD   levothyroxine (SYNTHROID) 100 mcg tablet Take 1 Tab by mouth Daily (before breakfast). Indications: hypothyroidism 0/35/60   Pham Childers MD   atorvastatin (LIPITOR) 10 mg tablet Take 1 Tab by mouth daily. Indications: hypercholesterolemia 5/51/47   Pham Childers MD   omeprazole (PRILOSEC) 20 mg capsule Take 1 Cap by mouth daily. 3/77/16   Pham Childers MD   metFORMIN (GLUCOPHAGE) 1,000 mg tablet Take 1 Tab by mouth two (2) times daily (with meals). Indications: type 2 diabetes mellitus 5/77/56   Pham Childers MD   dilTIAZem (CARDIZEM) 30 mg tablet Take 1 Tab by mouth two (2) times a day. 0/02/96   Pham Childers MD   aspirin 81 mg chewable tablet Take 1 Tab by mouth daily.  1/8/17   Tim Sofia MD       Past Medical History:   Diagnosis Date    Acid reflux     Cardiac dysrhythmia, unspecified 12/21/2011    Diabetes ([address]     GERD (gastroesophageal reflux disease)     Headache(784.0) 12/21/2011    Hypercholesterolemia     Hypothyroid     Ill-defined condition     COVID 2/21    Obesity     PUD (peptic ulcer disease)     TIA (transient ischemic attack)     2014        Past Surgical History:   Procedure Laterality Date    HX COLONOSCOPY  03/23/2018    HX GASTRECTOMY  1975    PUD    HX HEENT left retroorbital tumor resection    HX MOHS PROCEDURES Right     right shoulder    HX TUMOR REMOVAL  2015    Left Eye       Social History     Tobacco Use    Smoking status: Never Smoker    Smokeless tobacco: Never Used   Substance Use Topics    Alcohol use: No     Alcohol/week: 0.0 standard drinks        Family History   Problem Relation Age of Onset    Diabetes Sister     Cancer Sister         lung    Diabetes Mother     Cancer Father         brain    Diabetes Maternal Grandmother     Hypertension Neg Hx         Review of Systems:  (bold if positive, if negative)    Gen:  chills, fatigueEyes:  Visual changesENT:  CVS:  PalpitationsPulm:  Cough, dyspneaGI:  :  MS:  Skin:  Psych:  Endo:  Hem:  Renal:  Neuro:            Objective:      VITALS:    Vital signs reviewed; most recent are:    Visit Vitals  /63 (BP 1 Location: Left arm, BP Patient Position: At rest)   Pulse 99   Temp 100.2 °F (37.9 °C)   Resp 18   SpO2 94%     SpO2 Readings from Last 6 Encounters:   05/02/21 94%   02/28/21 93%   11/10/20 94%   09/05/18 96%   08/31/18 97%   08/22/18 97%    O2 Flow Rate (L/min): 2 l/min   No intake or output data in the 24 hours ending 05/02/21 2001     Exam:     Physical Exam:    Gen:   in no acute distress  HEENT:  Pink conjunctivae, PERRL, hearing intact to voice  Resp: Rhonchi bilaterally  Card:  RRR, No murmurs, normal S1, S2  Abd:  Soft, non-tender, non-distended, normoactive bowel sounds are present, no palpable organomegaly   Lymph:  No cervical adenopathy  Musc:  No cyanosis or clubbing  Skin:  No rashes or ulcers  Neuro:  Cranial nerves 3-12 are grossly intact, moves all extremities, follows commands appropriately  Psych:  Oriented to person, place, and time, Alert with fair insight      Labs:    Recent Labs     05/02/21  1702   WBC 8.9   HGB 13.2   HCT 40.9   *     Recent Labs     05/02/21  1702      K 3.8   *   CO2 25   *   BUN 12   CREA 0.85   CA 8.1*   ALB 2.7* TBILI 0.4   ALT 39     Lab Results   Component Value Date/Time    Glucose (POC) 106 (H) 01/08/2017 07:49 AM    Glucose (POC) 116 (H) 01/07/2017 09:11 PM     No results for input(s): PH, PCO2, PO2, HCO3, FIO2 in the last 72 hours. No results for input(s): INR, INREXT in the last 72 hours. Radiology and EKG reviewed: Sinus tachycardia with PACs. Chest x-ray with pneumonia     Old Records reviewed in [facility]       Assessment/Plan:         Sepsis/Febrile/tachycardia/ tachypnea ([facility] (5/2/2021): Likely due to pneumonia. Chest x-ray with evidence of pneumonia. Check pneumonia serologies. Follow blood cultures. Lactic acid within normal limits. Broad-spectrum antibiotics including IV vancomycin, cefepime Flagyl and azithromycin. Hypoxia/dyspnea/cough: Likely due to pneumonia. Patient on full dose anticoagulation which he states he is compliant with doubt PE. Patient desaturates into the 80s with movement on 2 L nasal cannula patient. Incentive spirometry. IV steroids. Duo nebs. ABG as needed. Consult Pulm. Pneumonia: plan as above. Atrial fibrillation: History of A. fib; currently in sinus. Continue home diltiazem as BP permits. Continue home Eliquis. Gentle IVF. Thrombocytopenia: likely due to sepsis. Monitor on eliquis. Hx of COVID: Recently treated for Covid pneumonia in February. Acquired hypothyroidism (1/14/2016): Check TSH. Continue home Synthroid. Mixed hyperlipidemia (10/2/2010): Continue with statin pending med rec completion. GERD (gastroesophageal reflux disease) (1/14/2016): Continue PPI     Diabetes mellitus type 2, controlled ([facility] (1/14/2016): Monitor on steroids. Hold home metformin. Insulin sliding scale. Actinic keratosis (11/15/2016)/ Atopic dermatitis (12/15/2016): Outpatient follow-up.         Risk of deterioration: high      Total time spent with patient: 48 Minutes **I personally saw and examined the patient during this time period**                 Care Plan discussed with: Patient    Discussed:  Care Plan    Prophylaxis:  Eliquis    Probable Disposition:  HH PT, OT, RN           ___________________________________________________    Attending Physician: Varun Members, DO

## 2021-05-03 NOTE — PROGRESS NOTES
CARE MANAGEMENT INITIAL ASSESSEMENT      NAME:   Mary Sylvester   :     1945   MRN:     [id number]       Emergency Contact:  Extended Emergency Contact Information  Primary Emergency Contact: Alla Grover  Address: [address] [place] [address] Phone: [phone]  Mobile Phone: [phone]  Relation: Spouse    Advance Directive:  Full Code, Pt has advance directive on chart. CM completed ACP with Pt.     Healthcare Decision Maker:    Primary Decision Maker: Alla Grover - Spouse - [phone] CM confirmed info with Pt    Reason for Admission:  Mr. Sheryl Kaur is a 67 y.o. male with history that includes type 2 diabetes, a-fib, and mixed HLD admitted for an emergent admission for:  shortness of breath    Patient Active Problem List   Diagnosis Code    Mixed hyperlipidemia E78.2    Obese E66.9    Chronic back pain M54.9, G89.29    Acquired hypothyroidism E03.9    GERD (gastroesophageal reflux disease) K21.9    Diabetes mellitus type 2, controlled (HCC) E11.9    Polyposis coli D12.6    Spondylosis of lumbar joint M47.816    Skin cancer of nose C44.301    Actinic keratosis L57.0    Atopic dermatitis L20.9    Sepsis ([facility]) A41.9    A-fib ([facility]) I48.91    Thrombocytopenia ([facility]) D69.6       Assessment: Via telephone with patient    RUR:  16%  Risk Level:  Low  Value-based purchasing:  No  Bundle patient:  No    Residency: Private residence  Exterior Steps:  3  Interior Steps:  None    Lives With: With spouse    Prior functioning:  Independent  Pt requires assistance with: N/A    Prior DME required:  [x]None  []RW  []Cane  []Crutches  []Bedside commode  []CPAP  []Home O2 (Liters/Provider: )  []Nebulizer   []Shower Chair  []Wheelchair  []Hospital Bed  []Leonid  []Stair lift  []Rollator  []Other:    DME available:  [x]None  []RW  []Cane  []Crutches  []Bedside commode  []CPAP  []Home O2 (Liter/Provider: )  []Nebulizer  []Shower Chair []Wheelchair  []Hospital Bed  []Leonid  []Stair lift  []Rollator  []Other:    Rehab history:  None     Discharge Concerns:  No    Insurer:  Payor: Ashlyn Mariya / Plan: [address] [place] [address] HMO / Product Type: Managed Care Medicare /     PCP: Roxan Heimlich, MD   Name of Practice: [facility]   Current patient: Yes   Approximate date of last visit: about a week ago   Access to virtual PCP visits: Yes    Pharmacy:  [address] Transport:  Family      Transition of care plan:   Home with outpatient follow-up      Comments:   CM completed initial assessment with Pt over the phone. Pt lives at home with spouse. Pt has no hx of HH, home O2, or equipment. Pt is independent with ADLs and ambulation. Pt denies problems with either. No needs identified at this time. _____________________________________  MALENA Dc - Care Management  5/3/2021   11:04 AM      Care Management Interventions  PCP Verified by CM: Gerald Garcia MD)  Last Visit to PCP: 04/27/21  Mode of Transport at Discharge:  Other (see comment)(spouse)  Transition of Care Consult (CM Consult): Discharge Planning  MyChart Signup: No  Discharge Durable Medical Equipment: No  Physical Therapy Consult: No  Occupational Therapy Consult: No  Speech Therapy Consult: No  Current Support Network: Lives with Spouse  Confirm Follow Up Transport: Family  Discharge Location  Discharge Placement: Home with outpatient services

## 2021-05-03 NOTE — PROGRESS NOTES
[facility]: MED RECONCILIATION    Comments/Recommendations:   Reviewed PTA medication with patient who is a good historian regarding medication but cannot provide doses. Contacted patient's spouse [phone]) who confirms doses with home Rx bottles. Reviewed with attending Dr. Keerthi Navarrete who states resume home antihypertensives/rate control meds    Medications added:     Metoprolol 25 mg BID    Medications removed:    Crestor  Kenalog    Medications adjusted:    Clobetasol and clotrimazole PRN  Diltiazem IR 60 mg BID  Levothyroxine 100 mcg    Information obtained from: Patient, RxQuery, Chart review, Home Rx bottles    Allergies: Ambien [zolpidem]    Prior to Admission Medications:     Medication Documentation Review Audit       Reviewed by Sukhdev Johnson PHARMD (Pharmacist) on 05/03/21 at 0919      Medication Sig Documenting Provider Last Dose Status Taking? apixaban (ELIQUIS) 5 mg tablet Take 1 Tab by mouth two (2) times a day. Indications: PREVENT THROMBOEMBOLISM IN CHRONIC ATRIAL FIBRILLATION Tamika Bourne MD  Active Yes   ascorbic acid, vitamin C, (VITAMIN C) 500 mg tablet Take 1,000 mg by mouth daily. Provider, Historical  Active Yes   aspirin 81 mg chewable tablet Take 1 Tab by mouth daily. Raad Wolff MD  Active Yes   atorvastatin (LIPITOR) 10 mg tablet Take 1 Tab by mouth daily. Indications: hypercholesterolemia Tamika Bourne MD  Active Yes   cholecalciferol, vitamin D3, (VITAMIN D3) 2,000 unit tab Take 1 Tab by mouth daily. Provider, Historical  Active Yes   clobetasol (TEMOVATE) 0.05 % external solution Apply  to affected area two (2) times daily as needed (Rash/itching). Provider, Historical  Active Yes           Med Note Belatonya Inderjit May 3, 2021  9:03 AM)     clotrimazole (LOTRIMIN) 1 % topical cream Apply  to affected area two (2) times daily as needed for Skin Irritation or Itching.  Provider, Historical  Active Yes   dilTIAZem IR (CARDIZEM) 60 mg tablet Take 60 mg by mouth two (2) times a day. Provider, Historical  Active Yes   levothyroxine (SYNTHROID) 100 mcg tablet Take 1 Tab by mouth Daily (before breakfast). Indications: hypothyroidism Vidhya Gomez MD  Active Yes   metFORMIN (GLUCOPHAGE) 1,000 mg tablet Take 1 Tab by mouth two (2) times daily (with meals). Indications: type 2 diabetes mellitus Ravin Jhaveri MD  Active Yes   metoprolol tartrate (LOPRESSOR) 25 mg tablet Take 25 mg by mouth two (2) times a day. Provider, Historical  Active Yes   multivitamin (ONE A DAY) tablet Take 1 Tab by mouth daily. Provider, Historical  Active Yes   omega 3-DHA-EPA-fish oil 1,000 mg (120 mg-180 mg) capsule Take 1 Cap by mouth daily. Provider, Historical  Active Yes   omeprazole (PRILOSEC) 20 mg capsule Take 1 Cap by mouth daily.  Vidhya Gomez MD  Active Yes                  Alysha Sommers [facility]   Contact: [phone]

## 2021-05-03 NOTE — PROGRESS NOTES
Bedside shift change report given to Dave (oncoming nurse) by Eugenio Spain (offgoing nurse). Report included the following information SBAR, Kardex, Intake/Output, MAR and Recent Results.

## 2021-05-03 NOTE — PROGRESS NOTES
[facility] Dosing Services: 5/2/2021    Cefepime was automatically dose-adjusted per [facility] P&T Committee Protocol, with respect to renal function. Assessment/Plan: CrCl 79 mL/min. Cefepime changed to 2 grams IV every 8 hours per P&T approved protocol.       Libia Mann, Pharmacist

## 2021-05-04 LAB
ALBUMIN SERPL-MCNC: 2.8 G/DL (ref 3.5–5)
ALBUMIN/GLOB SERPL: 0.9 {RATIO} (ref 1.1–2.2)
ALP SERPL-CCNC: 89 U/L (ref 45–117)
ALT SERPL-CCNC: 38 U/L (ref 12–78)
ANION GAP SERPL CALC-SCNC: 5 MMOL/L (ref 5–15)
AST SERPL-CCNC: 15 U/L (ref 15–37)
BASOPHILS # BLD: 0 K/UL (ref 0–0.1)
BASOPHILS NFR BLD: 0 % (ref 0–1)
BILIRUB SERPL-MCNC: 0.4 MG/DL (ref 0.2–1)
BUN SERPL-MCNC: 19 MG/DL (ref 6–20)
BUN/CREAT SERPL: 27 (ref 12–20)
CALCIUM SERPL-MCNC: 8.4 MG/DL (ref 8.5–10.1)
CHLORIDE SERPL-SCNC: 110 MMOL/L (ref 97–108)
CO2 SERPL-SCNC: 23 MMOL/L (ref 21–32)
CREAT SERPL-MCNC: 0.7 MG/DL (ref 0.7–1.3)
DATE LAST DOSE: ABNORMAL
DIFFERENTIAL METHOD BLD: ABNORMAL
EOSINOPHIL # BLD: 0 K/UL (ref 0–0.4)
EOSINOPHIL NFR BLD: 0 % (ref 0–7)
ERYTHROCYTE [DISTWIDTH] IN BLOOD BY AUTOMATED COUNT: 18.3 % (ref 11.5–14.5)
FLUID CULTURE, SPNG2: NORMAL
GLOBULIN SER CALC-MCNC: 3 G/DL (ref 2–4)
GLUCOSE BLD STRIP.AUTO-MCNC: 215 MG/DL (ref 65–100)
GLUCOSE BLD STRIP.AUTO-MCNC: 263 MG/DL (ref 65–100)
GLUCOSE BLD STRIP.AUTO-MCNC: 288 MG/DL (ref 65–100)
GLUCOSE BLD STRIP.AUTO-MCNC: 297 MG/DL (ref 65–100)
GLUCOSE SERPL-MCNC: 188 MG/DL (ref 65–100)
HCT VFR BLD AUTO: 39.8 % (ref 36.6–50.3)
HGB BLD-MCNC: 12.7 G/DL (ref 12.1–17)
IMM GRANULOCYTES # BLD AUTO: 0.2 K/UL (ref 0–0.04)
IMM GRANULOCYTES NFR BLD AUTO: 2 % (ref 0–0.5)
L PNEUMO1 AG UR QL IA: NEGATIVE
LYMPHOCYTES # BLD: 1.7 K/UL (ref 0.8–3.5)
LYMPHOCYTES NFR BLD: 15 % (ref 12–49)
M PNEUMO IGG SER IA-ACNC: <100 U/ML (ref 0–99)
M PNEUMO IGM SER IA-ACNC: <770 U/ML (ref 0–769)
MAGNESIUM SERPL-MCNC: 2.4 MG/DL (ref 1.6–2.4)
MCH RBC QN AUTO: 25.3 PG (ref 26–34)
MCHC RBC AUTO-ENTMCNC: 31.9 G/DL (ref 30–36.5)
MCV RBC AUTO: 79.4 FL (ref 80–99)
MONOCYTES # BLD: 0.4 K/UL (ref 0–1)
MONOCYTES NFR BLD: 4 % (ref 5–13)
NEUTS SEG # BLD: 9 K/UL (ref 1.8–8)
NEUTS SEG NFR BLD: 79 % (ref 32–75)
NRBC # BLD: 0 K/UL (ref 0–0.01)
NRBC BLD-RTO: 0 PER 100 WBC
ORGANISM ID, SPNG3: NORMAL
PLATELET # BLD AUTO: 132 K/UL (ref 150–400)
PLEASE NOTE, SPNG4: NORMAL
PMV BLD AUTO: 11.4 FL (ref 8.9–12.9)
POTASSIUM SERPL-SCNC: 4.7 MMOL/L (ref 3.5–5.1)
PROT SERPL-MCNC: 5.8 G/DL (ref 6.4–8.2)
RBC # BLD AUTO: 5.01 M/UL (ref 4.1–5.7)
REPORTED DOSE,DOSE: ABNORMAL UNITS
REPORTED DOSE/TIME,TMG: 2200
S PNEUM AG SPEC QL LA: NEGATIVE
SERVICE CMNT-IMP: ABNORMAL
SODIUM SERPL-SCNC: 138 MMOL/L (ref 136–145)
SPECIMEN SOURCE: NORMAL
SPECIMEN SOURCE: NORMAL
SPECIMEN, SPNG1: NORMAL
VANCOMYCIN TROUGH SERPL-MCNC: 10.8 UG/ML (ref 5–10)
WBC # BLD AUTO: 11.3 K/UL (ref 4.1–11.1)

## 2021-05-04 PROCEDURE — 74011250637 HC RX REV CODE- 250/637: Performed by: FAMILY MEDICINE

## 2021-05-04 PROCEDURE — 94664 DEMO&/EVAL PT USE INHALER: CPT

## 2021-05-04 PROCEDURE — 94640 AIRWAY INHALATION TREATMENT: CPT

## 2021-05-04 PROCEDURE — 80202 ASSAY OF VANCOMYCIN: CPT

## 2021-05-04 PROCEDURE — 85025 COMPLETE CBC W/AUTO DIFF WBC: CPT

## 2021-05-04 PROCEDURE — 74011250637 HC RX REV CODE- 250/637: Performed by: INTERNAL MEDICINE

## 2021-05-04 PROCEDURE — 80053 COMPREHEN METABOLIC PANEL: CPT

## 2021-05-04 PROCEDURE — 74011636637 HC RX REV CODE- 636/637: Performed by: FAMILY MEDICINE

## 2021-05-04 PROCEDURE — 83735 ASSAY OF MAGNESIUM: CPT

## 2021-05-04 PROCEDURE — 74011636637 HC RX REV CODE- 636/637: Performed by: INTERNAL MEDICINE

## 2021-05-04 PROCEDURE — 65660000000 HC RM CCU STEPDOWN

## 2021-05-04 PROCEDURE — 74011250636 HC RX REV CODE- 250/636: Performed by: FAMILY MEDICINE

## 2021-05-04 PROCEDURE — 74011000250 HC RX REV CODE- 250: Performed by: INTERNAL MEDICINE

## 2021-05-04 PROCEDURE — 94760 N-INVAS EAR/PLS OXIMETRY 1: CPT

## 2021-05-04 PROCEDURE — 74011250636 HC RX REV CODE- 250/636: Performed by: INTERNAL MEDICINE

## 2021-05-04 PROCEDURE — 36415 COLL VENOUS BLD VENIPUNCTURE: CPT

## 2021-05-04 PROCEDURE — 82962 GLUCOSE BLOOD TEST: CPT

## 2021-05-04 PROCEDURE — 77010033678 HC OXYGEN DAILY

## 2021-05-04 PROCEDURE — 94761 N-INVAS EAR/PLS OXIMETRY MLT: CPT

## 2021-05-04 RX ORDER — PREDNISONE 20 MG/1
60 TABLET ORAL DAILY
Status: DISCONTINUED | OUTPATIENT
Start: 2021-05-04 | End: 2021-05-05

## 2021-05-04 RX ADMIN — PANTOPRAZOLE SODIUM 40 MG: 40 TABLET, DELAYED RELEASE ORAL at 12:35

## 2021-05-04 RX ADMIN — METRONIDAZOLE 500 MG: 500 INJECTION, SOLUTION INTRAVENOUS at 22:28

## 2021-05-04 RX ADMIN — ATORVASTATIN CALCIUM 10 MG: 10 TABLET, FILM COATED ORAL at 10:19

## 2021-05-04 RX ADMIN — DILTIAZEM HYDROCHLORIDE 60 MG: 30 TABLET, FILM COATED ORAL at 10:19

## 2021-05-04 RX ADMIN — IPRATROPIUM BROMIDE AND ALBUTEROL 1 PUFF: 20; 100 SPRAY, METERED RESPIRATORY (INHALATION) at 08:56

## 2021-05-04 RX ADMIN — INSULIN LISPRO 5 UNITS: 100 INJECTION, SOLUTION INTRAVENOUS; SUBCUTANEOUS at 18:08

## 2021-05-04 RX ADMIN — Medication 10 ML: at 12:36

## 2021-05-04 RX ADMIN — Medication 10 ML: at 06:32

## 2021-05-04 RX ADMIN — IPRATROPIUM BROMIDE AND ALBUTEROL 1 PUFF: 20; 100 SPRAY, METERED RESPIRATORY (INHALATION) at 19:42

## 2021-05-04 RX ADMIN — INSULIN LISPRO 5 UNITS: 100 INJECTION, SOLUTION INTRAVENOUS; SUBCUTANEOUS at 12:34

## 2021-05-04 RX ADMIN — VANCOMYCIN HYDROCHLORIDE 1000 MG: 1 INJECTION, POWDER, LYOPHILIZED, FOR SOLUTION INTRAVENOUS at 18:09

## 2021-05-04 RX ADMIN — VANCOMYCIN HYDROCHLORIDE 1250 MG: 1.25 INJECTION, POWDER, LYOPHILIZED, FOR SOLUTION INTRAVENOUS at 10:23

## 2021-05-04 RX ADMIN — IPRATROPIUM BROMIDE AND ALBUTEROL 1 PUFF: 20; 100 SPRAY, METERED RESPIRATORY (INHALATION) at 15:11

## 2021-05-04 RX ADMIN — METHYLPREDNISOLONE SODIUM SUCCINATE 40 MG: 40 INJECTION, POWDER, FOR SOLUTION INTRAMUSCULAR; INTRAVENOUS at 06:32

## 2021-05-04 RX ADMIN — INSULIN LISPRO 3 UNITS: 100 INJECTION, SOLUTION INTRAVENOUS; SUBCUTANEOUS at 22:28

## 2021-05-04 RX ADMIN — AZITHROMYCIN MONOHYDRATE 500 MG: 500 INJECTION, POWDER, LYOPHILIZED, FOR SOLUTION INTRAVENOUS at 20:50

## 2021-05-04 RX ADMIN — METRONIDAZOLE 500 MG: 500 INJECTION, SOLUTION INTRAVENOUS at 10:23

## 2021-05-04 RX ADMIN — DILTIAZEM HYDROCHLORIDE 60 MG: 30 TABLET, FILM COATED ORAL at 22:27

## 2021-05-04 RX ADMIN — CEFEPIME HYDROCHLORIDE 2 G: 2 INJECTION, POWDER, FOR SOLUTION INTRAVENOUS at 20:03

## 2021-05-04 RX ADMIN — SODIUM CHLORIDE 75 ML/HR: 9 INJECTION, SOLUTION INTRAVENOUS at 06:33

## 2021-05-04 RX ADMIN — PREDNISONE 60 MG: 20 TABLET ORAL at 10:19

## 2021-05-04 RX ADMIN — INSULIN LISPRO 3 UNITS: 100 INJECTION, SOLUTION INTRAVENOUS; SUBCUTANEOUS at 08:12

## 2021-05-04 RX ADMIN — IPRATROPIUM BROMIDE AND ALBUTEROL 1 PUFF: 20; 100 SPRAY, METERED RESPIRATORY (INHALATION) at 01:53

## 2021-05-04 RX ADMIN — LEVOTHYROXINE SODIUM 100 MCG: 0.1 TABLET ORAL at 06:31

## 2021-05-04 RX ADMIN — APIXABAN 5 MG: 5 TABLET, FILM COATED ORAL at 10:19

## 2021-05-04 RX ADMIN — APIXABAN 5 MG: 5 TABLET, FILM COATED ORAL at 22:27

## 2021-05-04 RX ADMIN — CEFEPIME HYDROCHLORIDE 2 G: 2 INJECTION, POWDER, FOR SOLUTION INTRAVENOUS at 04:24

## 2021-05-04 RX ADMIN — METOPROLOL TARTRATE 25 MG: 25 TABLET, FILM COATED ORAL at 22:27

## 2021-05-04 RX ADMIN — CEFEPIME HYDROCHLORIDE 2 G: 2 INJECTION, POWDER, FOR SOLUTION INTRAVENOUS at 12:32

## 2021-05-04 RX ADMIN — METOPROLOL TARTRATE 25 MG: 25 TABLET, FILM COATED ORAL at 10:19

## 2021-05-04 RX ADMIN — Medication 10 ML: at 22:28

## 2021-05-04 NOTE — PROGRESS NOTES
[address] [place] [address] Pharmacy Dosing Services: Antimicrobial Stewardship Daily Doc    Consult for antibiotic dosing of Vancomycin by Dr. Noy Raygoza (attending Dr. Ale Poe)  Indication:sepsis/pna  Day of Therapy 3    Vancomycin therapy:  Current maintenance dose: 1250 mg every 12 hours   Goal trough 15-20 mcg/ml  Last trough level 10.8 mcg/ml drawn 1 hour early. Corrected trough 9.8 mcg/ml. Adjustment in Therapy:1000 mg every 8 hours  Dose calculated to approximate a therapeutic trough of 15 mcg/mL.       Pharmacy to follow daily    Pharmacist [address] [place] [address] information: [phone]

## 2021-05-04 NOTE — PROGRESS NOTES
Problem: Falls - Risk of  Goal: *Absence of Falls  Description: Document Alena Don Fall Risk and appropriate interventions in the flowsheet.   Outcome: Progressing Towards Goal  Note: Fall Risk Interventions:  Mobility Interventions: Patient to call before getting OOB         Medication Interventions: Patient to call before getting OOB    Elimination Interventions: Patient to call for help with toileting needs    History of Falls Interventions: Evaluate medications/consider consulting pharmacy         Problem: Gas Exchange - Impaired  Goal: *Absence of hypoxia  Outcome: Progressing Towards Goal

## 2021-05-04 NOTE — PROGRESS NOTES
Daily Progress Note: 5/4/2021  Homer Irving MD    Assessment/Plan:   Sepsis/Febrile/tachycardia/ tachypnea [facility]) (5/2/2021): Likely due to pneumonia. Chest x-ray with evidence of pneumonia.   -Follow blood cultures. Lactic acid within normal limits. Broad-spectrum antibiotics including IV vancomycin, cefepime Flagyl and azithromycin. He has completed both [place] vaccinations.      Hypoxia/dyspnea/cough: Likely due to pneumonia. Patient on full dose anticoagulation which he states he is compliant with doubt PE. POA patient desaturated into the 80s with movement on 2 L nasal cannula patient.   -Incentive spirometry. IV steroids weaned to po. Duo nebs. Consulted Pulm.      Pneumonia: plan as above.      Atrial fibrillation: History of A. fib; currently in sinus.  - Continue home diltiazem as BP permits. Continue home Eliquis. Gentle IVF.     Thrombocytopenia: likely due to sepsis. Monitor on eliquis.      Hx of COVID: Recently treated for Covid pneumonia in February.     Acquired hypothyroidism (1/14/2016): Check TSH. Continue home Synthroid.     Mixed hyperlipidemia (10/2/2010): Continue with statin.      GERD (gastroesophageal reflux disease) (1/14/2016): Continue PPI     Diabetes mellitus type 2, controlled ([facility] (1/14/2016): Monitor on steroids. Hold home metformin inpt. Insulin sliding scale.     Actinic keratosis (11/15/2016)/ Atopic dermatitis (12/15/2016): Outpatient follow-up.      Problem List:  Problem List as of 5/4/2021 Date Reviewed: 5/2/2021          Codes Class Noted - Resolved    RESOLVED: Diarrhea ICD-10-CM: R19.7  ICD-9-CM: 787.91 Chronic 12/21/2011 - 1/6/2017        Sepsis ([facility] ICD-10-CM: A41.9  ICD-9-CM: 038.9, 995.91  5/2/2021 - Present        A-fib ([facility] ICD-10-CM: I48.91  ICD-9-CM: 427.31  5/2/2021 - Present        Thrombocytopenia ([facility] ICD-10-CM: D69.6  ICD-9-CM: 287.5  5/2/2021 - Present        Atopic dermatitis (Chronic) ICD-10-CM: L20.9  ICD-9-CM: 691.8  12/15/2016 - Present        Actinic keratosis (Chronic) ICD-10-CM: L57.0  ICD-9-CM: 702.0  11/15/2016 - Present        Skin cancer of nose (Chronic) ICD-10-CM: C44.301  ICD-9-CM: 173.30  4/18/2016 - Present        Spondylosis of lumbar joint (Chronic) ICD-10-CM: M47.816  ICD-9-CM: 721.3  3/14/2016 - Present        Polyposis coli (Chronic) ICD-10-CM: D12.6  ICD-9-CM: 211.3  2/19/2016 - Present    Overview Addendum 3/14/2016  8:19 PM by MD Dr. Desiree Gilbert 2016             Chronic back pain (Chronic) ICD-10-CM: M54.9, G89.29  ICD-9-CM: 724.5, 338.29  1/14/2016 - Present        Acquired hypothyroidism (Chronic) ICD-10-CM: E03.9  ICD-9-CM: 244.9  1/14/2016 - Present        GERD (gastroesophageal reflux disease) (Chronic) ICD-10-CM: K21.9  ICD-9-CM: 530.81  1/14/2016 - Present        Diabetes mellitus type 2, controlled ([facility] (Chronic) ICD-10-CM: E11.9  ICD-9-CM: 250.00  1/14/2016 - Present        Obese (Chronic) ICD-10-CM: E66.9  ICD-9-CM: 278.00  12/21/2011 - Present        Mixed hyperlipidemia (Chronic) ICD-10-CM: E78.2  ICD-9-CM: 272.2  10/2/2010 - Present        RESOLVED: Atrial fibrillation with RVR ([facility] ICD-10-CM: I48.91  ICD-9-CM: 427.31  1/6/2017 - 5/2/2021        RESOLVED: Laceration ICD-10-CM: DBR8250  ICD-9-CM: 879.8  10/21/2016 - 1/6/2017        RESOLVED: Chest pain ICD-10-CM: R07.9  ICD-9-CM: 786.50  4/29/2016 - 1/6/2017        RESOLVED: Cardiac dysrhythmia ICD-10-CM: I49.9  ICD-9-CM: 427.9  12/21/2011 - 1/6/2017        RESOLVED: IMKCNCHU(429.2) ICD-10-CM: R51  ICD-9-CM: 784.0  12/21/2011 - 1/6/2017        RESOLVED: TIA (transient ischemic attack) ICD-10-CM: G45.9  ICD-9-CM: 435.9  12/21/2011 - 1/6/2017        RESOLVED: Insomnia, unspecified ICD-10-CM: G47.00  ICD-9-CM: 780.52  6/17/2010 - 1/6/2017            Subjective:    67 y.o. male with a past medical history of A. fib on Eliquis, diabetes, hypothyroidism, hyperlipidemia and GERD who is admitted with sepsis.   Mr. Aj Gil was diagnosed with Covid pneumonia in February and has suffered from shortness of breath since then. However, he states this past Friday his shortness of breath became worse. He endorses an associated nonproductive cough, wheezing, chills, heart palpitations and blurry vision. He denies any current chest pain or syncope. He states that his primary care doctor recently started him on a course of Levaquin and he recently completed a steroid taper. During our encounter, he desaturates to the 80s on movement in the bed. (Dr Alison Sun)    5/3:  Overall reports he is breathing much better. Still with cough and congestion and \"feeling weak all over. \"  No other complaints. He has completed both [employer] Covid vaccinations. Tmax 101. Tnow 98.7. WBC ok.      :  About the same as yesterday. Still needs O2. Cough and congestion about the same. Respiratory viral panel neg. Tmax 98. D-dimer ok. DCed solumedrol and switched to 60mg po Prednisone and wean further as able. Review of Systems:   A comprehensive review of systems was negative except for that written in the HPI. Objective:   Physical Exam:   Visit Vitals  /71   Pulse 75   Temp 97.4 °F (36.3 °C)   Resp 18   Ht 5' 8\" (1.727 m)   Wt 85.3 kg (188 lb)   SpO2 94%   BMI 28.59 kg/m²    O2 Flow Rate (L/min): 2 l/min O2 Device: Nasal cannula  Temp (24hrs), Av.7 °F (36.5 °C), Min:97.4 °F (36.3 °C), Max:98 °F (36.7 °C)    No intake/output data recorded.  1901 -  0700  In: 3460 [P.O.:580; I.V.:2880]  Out: 450 [Urine:450]    General:  Alert, cooperative, no distress, appears stated age. Head:  Normocephalic, without obvious abnormality, atraumatic. Eyes:  Conjunctivae/corneas clear. PERRL, EOMs intact. Nose: Nares normal. Septum midline. Mucosa normal. No drainage or sinus tenderness.    Throat: Lips, mucosa, and tongue moist..   Neck: Supple, symmetrical, trachea midline, no adenopathy, thyroid: no enlargement/tenderness/nodules, no carotid bruit and no JVD. No accessory muscle use. Lungs:   A few scattered rhonchi bilaterally. Chest wall:  No tenderness or deformity. Heart:  Regular rate and rhythm, S1, S2 normal, no murmur, click, rub or gallop. Abdomen:   Soft, non-tender. Bowel sounds normal. No masses,  No organomegaly. Extremities: no cyanosis or edema. No calf tenderness or cords. Pulses: 2+ and symmetric all extremities. Skin: Skin color, texture, turgor normal. No rashes or lesions   Neurologic: CNII-XII intact. Alert and oriented X 3. Fine motor of hands and fingers normal.   equal.  No cogwheeling or rigidity. Gait not tested at this time. Sensation grossly normal to touch. Gross motor of extremities normal.       Data Review:       Recent Days:  Recent Labs     05/04/21  0206 05/03/21  0350 05/02/21  1702   WBC 11.3* 5.6 8.9   HGB 12.7 11.9* 13.2   HCT 39.8 38.9 40.9   * 106* 116*     Recent Labs     05/04/21  0206 05/03/21  0350 05/02/21  1702    138 140   K 4.7 4.2 3.8   * 108 109*   CO2 23 22 25   * 304* 117*   BUN 19 14 12   CREA 0.70 0.82 0.85   CA 8.4* 7.5* 8.1*   MG 2.4 2.0  --    ALB 2.8* 2.5* 2.7*   TBILI 0.4 0.5 0.4   ALT 38 36 39     No results for input(s): PH, PCO2, PO2, HCO3, FIO2 in the last 72 hours. 24 Hour Results:  Recent Results (from the past 24 hour(s))   GLUCOSE, POC    Collection Time: 05/03/21 11:15 AM   Result Value Ref Range    Glucose (POC) 297 (H) 65 - 100 mg/dL    Performed by Howie Anton    D DIMER    Collection Time: 05/03/21 11:17 AM   Result Value Ref Range    D-dimer 0.38 0.00 - 0.65 mg/L FEU   SAMPLES BEING HELD    Collection Time: 05/03/21 11:17 AM   Result Value Ref Range    SAMPLES BEING HELD 1SST     COMMENT        Add-on orders for these samples will be processed based on acceptable specimen integrity and analyte stability, which may vary by analyte.    ECHO ADULT COMPLETE    Collection Time: 05/03/21  2:46 PM   Result Value Ref Range IVSd 1.51 (A) 0.60 - 1.00 cm    LVIDd 4.31 4.20 - 5.90 cm    LVIDs 3.05 cm    LVOT d 2.01 cm    LVPWd 1.46 (A) 0.60 - 1.00 cm    LVOT Peak Gradient 6.98 mmHg    Left Ventricular Outflow Tract Mean Gradient 4.63 mmHg    LVOT .5 mL    LVOT Peak Velocity 132.06 cm/s    LVOT VTI 32.05 cm    RVIDd 4.14 cm    Left Atrium Major Axis 4.07 cm    LA Volume 65.90 18.0 - 58.0 mL    LA Area 4C 21.14 cm2    LA Vol 2C 66.76 (A) 18.00 - 58.00 mL    LA Vol 4C 53.55 18.00 - 58.00 mL    LA Volume DISK BP 60.99 18.0 - 58.0 mL    Aortic Valve Area by Continuity of Peak Velocity 2.44 cm2    Aortic Valve Area by Continuity of VTI 2.72 cm2    Aortic Regurgitant Pressure Half-time 489.53 ms    AR Max Josué 297.39 centimeter/second    AoV PG 11.76 mmHg    Aortic Valve Systolic Mean Gradient 4.33 mmHg    Aortic Valve Systolic Peak Velocity 545.11 cm/s    AoV VTI 37.35 cm    MV A Josué 89.75 centimeter/second    Mitral Valve E Wave Deceleration Time 250.82 ms    MV E Josué 92.26 centimeter/second    LV E' Lateral Velocity 6.78 centimeter/second    LV E' Septal Velocity 8.66 centimeter/second    Mitral Valve Pressure Half-time 72.74 ms    MVA (PHT) 3.02 cm2    Pulmonic Valve Systolic Peak Instantaneous Gradient 4.45 mmHg    Pulmonic Valve Max Velocity 104.73 cm/s    Tapse 2.66 (A) 1.50 - 2.00 cm    Triscuspid Valve Regurgitation Peak Gradient 36.73 mmHg    TR Max Velocity 303.01 cm/s    AO ASC D 2.96 cm    Ao Root D 4.11 cm    LV Mass .0 88.0 - 224.0 g    LV Mass AL Index 128.1 49.0 - 115.0 g/m2    Left Atrium Minor Axis 2.04 cm    LA Vol Index 33.10 16.00 - 28.00 ml/m2    LA Vol Index 33.53 16.00 - 28.00 ml/m2    LA Vol Index 26.90 16.00 - 28.00 ml/m2    KARO/BSA Pk Josué 1.2 cm2/m2    KARO/BSA VTI 1.4 cm2/m2   GLUCOSE, POC    Collection Time: 05/03/21  5:42 PM   Result Value Ref Range    Glucose (POC) 206 (H) 65 - 100 mg/dL    Performed by Dese[place], POC    Collection Time: 05/03/21  9:34 PM   Result Value Ref Range Glucose (POC) 343 (H) 65 - 100 mg/dL    Performed by Bertha Ramachandran (PCT)    METABOLIC PANEL, COMPREHENSIVE    Collection Time: 05/04/21  2:06 AM   Result Value Ref Range    Sodium 138 136 - 145 mmol/L    Potassium 4.7 3.5 - 5.1 mmol/L    Chloride 110 (H) 97 - 108 mmol/L    CO2 23 21 - 32 mmol/L    Anion gap 5 5 - 15 mmol/L    Glucose 188 (H) 65 - 100 mg/dL    BUN 19 6 - 20 MG/DL    Creatinine 0.70 0.70 - 1.30 MG/DL    BUN/Creatinine ratio 27 (H) 12 - 20      GFR est AA >60 >60 ml/min/1.73m2    GFR est non-AA >60 >60 ml/min/1.73m2    Calcium 8.4 (L) 8.5 - 10.1 MG/DL    Bilirubin, total 0.4 0.2 - 1.0 MG/DL    ALT (SGPT) 38 12 - 78 U/L    AST (SGOT) 15 15 - 37 U/L    Alk. phosphatase 89 45 - 117 U/L    Protein, total 5.8 (L) 6.4 - 8.2 g/dL    Albumin 2.8 (L) 3.5 - 5.0 g/dL    Globulin 3.0 2.0 - 4.0 g/dL    A-G Ratio 0.9 (L) 1.1 - 2.2     MAGNESIUM    Collection Time: 05/04/21  2:06 AM   Result Value Ref Range    Magnesium 2.4 1.6 - 2.4 mg/dL   CBC WITH AUTOMATED DIFF    Collection Time: 05/04/21  2:06 AM   Result Value Ref Range    WBC 11.3 (H) 4.1 - 11.1 K/uL    RBC 5.01 4.10 - 5.70 M/uL    HGB 12.7 12.1 - 17.0 g/dL    HCT 39.8 36.6 - 50.3 %    MCV 79.4 (L) 80.0 - 99.0 FL    MCH 25.3 (L) 26.0 - 34.0 PG    MCHC 31.9 30.0 - 36.5 g/dL    RDW 18.3 (H) 11.5 - 14.5 %    PLATELET 835 (L) 216 - 400 K/uL    MPV 11.4 8.9 - 12.9 FL    NRBC 0.0 0  WBC    ABSOLUTE NRBC 0.00 0.00 - 0.01 K/uL    NEUTROPHILS 79 (H) 32 - 75 %    LYMPHOCYTES 15 12 - 49 %    MONOCYTES 4 (L) 5 - 13 %    EOSINOPHILS 0 0 - 7 %    BASOPHILS 0 0 - 1 %    IMMATURE GRANULOCYTES 2 (H) 0.0 - 0.5 %    ABS. NEUTROPHILS 9.0 (H) 1.8 - 8.0 K/UL    ABS. LYMPHOCYTES 1.7 0.8 - 3.5 K/UL    ABS. MONOCYTES 0.4 0.0 - 1.0 K/UL    ABS. EOSINOPHILS 0.0 0.0 - 0.4 K/UL    ABS. BASOPHILS 0.0 0.0 - 0.1 K/UL    ABS. IMM.  GRANS. 0.2 (H) 0.00 - 0.04 K/UL    DF AUTOMATED     GLUCOSE, POC    Collection Time: 05/04/21  6:20 AM   Result Value Ref Range    Glucose (POC) 215 (H) 65 - 100 mg/dL    Performed by Leonard Vega (PCT)        Medications reviewed  Current Facility-Administered Medications   Medication Dose Route Frequency    apixaban (ELIQUIS) tablet 5 mg  5 mg Oral BID    levothyroxine (SYNTHROID) tablet 100 mcg  100 mcg Oral ACB    dilTIAZem IR (CARDIZEM) tablet 60 mg  60 mg Oral BID    metoprolol tartrate (LOPRESSOR) tablet 25 mg  25 mg Oral Q12H    atorvastatin (LIPITOR) tablet 10 mg  10 mg Oral DAILY    pantoprazole (PROTONIX) tablet 40 mg  40 mg Oral ACB    Vancomycin Trough 09:30  1 Each Other ONCE    sodium chloride (NS) flush 5-10 mL  5-10 mL IntraVENous PRN    sodium chloride (NS) flush 5-40 mL  5-40 mL IntraVENous Q8H    sodium chloride (NS) flush 5-40 mL  5-40 mL IntraVENous PRN    acetaminophen (TYLENOL) tablet 650 mg  650 mg Oral Q6H PRN    Or    acetaminophen (TYLENOL) suppository 650 mg  650 mg Rectal Q6H PRN    polyethylene glycol (MIRALAX) packet 17 g  17 g Oral DAILY PRN    promethazine (PHENERGAN) tablet 12.5 mg  12.5 mg Oral Q6H PRN    Or    ondansetron (ZOFRAN) injection 4 mg  4 mg IntraVENous Q6H PRN    insulin lispro (HUMALOG) injection   SubCUTAneous AC&HS    glucose chewable tablet 16 g  4 Tab Oral PRN    dextrose (D50W) injection syrg 12.5-25 g  12.5-25 g IntraVENous PRN    glucagon (GLUCAGEN) injection 1 mg  1 mg IntraMUSCular PRN    metroNIDAZOLE (FLAGYL) IVPB premix 500 mg  500 mg IntraVENous Q12H    azithromycin (ZITHROMAX) 500 mg in 0.9% sodium chloride 250 mL (VIAL-MATE)  500 mg IntraVENous Q24H    methylPREDNISolone (PF) (SOLU-MEDROL) injection 40 mg  40 mg IntraVENous Q8H    ipratropium-albuterol (COMBIVENT RESPIMAT) 20 mcg-100 mcg inhalation spray  1 Puff Inhalation Q6H RT    0.9% sodium chloride infusion  75 mL/hr IntraVENous CONTINUOUS    cefepime (MAXIPIME) 2 g in sterile water (preservative free) 10 mL IV syringe  2 g IntraVENous Q8H    vancomycin (VANCOCIN) 1,250 mg in 0.9% sodium chloride 250 mL (VIAL-MATE)  1,250 mg IntraVENous Q12H     Care Plan discussed with: Patient/Family and Nurse  Total time spent with patient: 30 minutes.   Gabe Huang MD

## 2021-05-04 NOTE — CONSULTS
[facility]  Pulmonary, Critical Care, and Sleep Medicine    pulm progress note     Name: Sabi Arora MRN: [id number]   : 1945 Hospital: [facility]   Date: 2021        IMPRESSION:   · Admitted with sepsis   · pna - ? Cap - procalcitonin is a little elevated   · dypnea since covid - worse since last Friday - better today   · Reported hypoxia with exertion on 2 liters  ( to 80's ) - sat currently 97 % on 3 liters   · S/p covid 2021 ( ) evidence of post covid syndrome- neg rvp and covid this admit   · Chronic anticoagulation for afib - d dimer is ok   ·  hx of afib   ·  dm       RECOMMENDATIONS:   · Agree with comprehensive care -   · Fu  Urine ag and mycoplasma that are pd - still pd   · Fu bc - pd   · o2 and wean as able / will need o2 assessment prior to dc   ·  continue currently inhaler- he was on dulera 200 mg at  Home which he said helped. Can  Go back to that if he prefers   · Par  Will be glad to see him post dc if he and primary like for post covid - looks like he had good mgt by Dr. Dhiraj Mcdaniel. · Can wean steroids as tolerated. Subjective: This patient has been seen and evaluated at the request of  for Alissa Donis for  Bojorquez / Seabron Wagoner and hypoxia and concern for sepsis . Tomas Rome Patient is a 67 y.o. male  Who denies hx of pulm problems. He was admitted for several days with covid pna in FEb at . He was not seen by par  At the time. Pt says he thinks he was dc too soon but has per his report been followed closely by his primary md , Dr. Dhiraj Mcdaniel. He has noted persistent sob. Last Friday he noted increased bojorquez/sob and a hacking cough . He was started  On levaquin per his primary . He presented to er yesterday with  Fever , cxr showed bilateral infiltrates and it was reported that his sats dropped to 80's on nc o2 with exertion . Overnight he feels better with decreased temp and decreased cough .  He had just returned from the bathroom to the chair and off o2 sats were 93 %  On my check . He has been broadly covered with ab.      NO cp, no sinus currently . NO increased lower ext edema or pain . NO wheezing. 5/4 - says o2 was on 1 liters ( noted now on 3 liters ) says he  Anticipates dc tomorrow. Past Medical History:   Diagnosis Date    Acid reflux     Cardiac dysrhythmia, unspecified 12/21/2011    Diabetes ([address]     GERD (gastroesophageal reflux disease)     Headache(784.0) 12/21/2011    Hypercholesterolemia     Hypothyroid     Ill-defined condition     COVID 2/21    Obesity     PUD (peptic ulcer disease)     TIA (transient ischemic attack)     2014      Past Surgical History:   Procedure Laterality Date    HX COLONOSCOPY  03/23/2018    HX GASTRECTOMY  1975    PUD    HX HEENT      left retroorbital tumor resection    HX MOHS PROCEDURES Right     right shoulder    HX TUMOR REMOVAL  2015    Left Eye      Prior to Admission medications    Medication Sig Start Date End Date Taking? Authorizing Provider   clotrimazole (LOTRIMIN) 1 % topical cream Apply  to affected area two (2) times daily as needed for Skin Irritation or Itching. Yes Provider, Historical   dilTIAZem IR (CARDIZEM) 60 mg tablet Take 60 mg by mouth two (2) times a day. Yes Provider, Historical   metoprolol tartrate (LOPRESSOR) 25 mg tablet Take 25 mg by mouth two (2) times a day. Yes Provider, Historical   cholecalciferol, vitamin D3, (VITAMIN D3) 2,000 unit tab Take 1 Tab by mouth daily. Yes Provider, Historical   omega 3-DHA-EPA-fish oil 1,000 mg (120 mg-180 mg) capsule Take 1 Cap by mouth daily. Yes Provider, Historical   clobetasol (TEMOVATE) 0.05 % external solution Apply  to affected area two (2) times daily as needed (Rash/itching). 1/29/18  Yes Provider, Historical   multivitamin (ONE A DAY) tablet Take 1 Tab by mouth daily. Yes Provider, Historical   ascorbic acid, vitamin C, (VITAMIN C) 500 mg tablet Take 1,000 mg by mouth daily.    Yes Provider, Historical   apixaban (ELIQUIS) 5 mg tablet Take 1 Tab by mouth two (2) times a day. Indications: PREVENT THROMBOEMBOLISM IN CHRONIC ATRIAL FIBRILLATION 4/41/62  Yes Loki Richter MD   levothyroxine (SYNTHROID) 100 mcg tablet Take 1 Tab by mouth Daily (before breakfast). Indications: hypothyroidism 5/13/57  Yes Loki Richter MD   atorvastatin (LIPITOR) 10 mg tablet Take 1 Tab by mouth daily. Indications: hypercholesterolemia 6/03/33  Yes Loki Richter MD   omeprazole (PRILOSEC) 20 mg capsule Take 1 Cap by mouth daily. 2/99/40  Yes Loki Richter MD   metFORMIN (GLUCOPHAGE) 1,000 mg tablet Take 1 Tab by mouth two (2) times daily (with meals). Indications: type 2 diabetes mellitus 2/16/23  Yes Loki Richter MD   aspirin 81 mg chewable tablet Take 1 Tab by mouth daily.  1/8/17  Yes Eve Wolff MD   ;evaquin   dulera 200mg   Allergies   Allergen Reactions    Ambien [Zolpidem] Other (comments)     Sleep walking  Doing things like cooking but he was not awake      Social History     Tobacco Use    Smoking status: Never Smoker    Smokeless tobacco: Never Used   Substance Use Topics    Alcohol use: No     Alcohol/week: 0.0 standard drinks    remote tobacco - quit in 1970's      Family History   Problem Relation Age of Onset    Diabetes Sister     Cancer Sister         lung    Diabetes Mother     Cancer Father         brain    Diabetes Maternal Grandmother     Hypertension Neg Hx         Current Facility-Administered Medications   Medication Dose Route Frequency    predniSONE (DELTASONE) tablet 60 mg  60 mg Oral DAILY    apixaban (ELIQUIS) tablet 5 mg  5 mg Oral BID    levothyroxine (SYNTHROID) tablet 100 mcg  100 mcg Oral ACB    dilTIAZem IR (CARDIZEM) tablet 60 mg  60 mg Oral BID    metoprolol tartrate (LOPRESSOR) tablet 25 mg  25 mg Oral Q12H    atorvastatin (LIPITOR) tablet 10 mg  10 mg Oral DAILY    pantoprazole (PROTONIX) tablet 40 mg  40 mg Oral ACB    sodium chloride (NS) flush 5-40 mL  5-40 mL IntraVENous Q8H    insulin lispro (HUMALOG) injection   SubCUTAneous AC&HS    metroNIDAZOLE (FLAGYL) IVPB premix 500 mg  500 mg IntraVENous Q12H    azithromycin (ZITHROMAX) 500 mg in 0.9% sodium chloride 250 mL (VIAL-MATE)  500 mg IntraVENous Q24H    ipratropium-albuterol (COMBIVENT RESPIMAT) 20 mcg-100 mcg inhalation spray  1 Puff Inhalation Q6H RT    0.9% sodium chloride infusion  75 mL/hr IntraVENous CONTINUOUS    cefepime (MAXIPIME) 2 g in sterile water (preservative free) 10 mL IV syringe  2 g IntraVENous Q8H    vancomycin (VANCOCIN) 1,250 mg in 0.9% sodium chloride 250 mL (VIAL-MATE)  1,250 mg IntraVENous Q12H       Review of Systems:  A comprehensive review of systems was negative except for that written in the HPI. Objective:   Vital Signs:    Visit Vitals  /69   Pulse 87   Temp 97.3 °F (36.3 °C)   Resp 18   Ht 5' 8\" (1.727 m)   Wt 85.3 kg (188 lb)   SpO2 94%   BMI 28.59 kg/m²       O2 Device: Nasal cannula   O2 Flow Rate (L/min): 2 l/min   Temp (24hrs), Av.7 °F (36.5 °C), Min:97.3 °F (36.3 °C), Max:98 °F (36.7 °C)       Intake/Output:   Last shift:      No intake/output data recorded. Last 3 shifts:  1901 -  0700  In: 3460 [P.O.:580; I.V.:2880]  Out: 450 [Urine:450]    Intake/Output Summary (Last 24 hours) at 2021 1207  Last data filed at 2021 0604  Gross per 24 hour   Intake 2765 ml   Output 450 ml   Net 2315 ml      Physical Exam:   General:  Alert, cooperative, no distress, appears stated age. Heavy set    Head:  Normocephalic, without obvious abnormality, atraumatic. Eyes:  Conjunctivae/corneas clear. PERRL, EOMs intact. Nose: Nares normal.    Throat: Not examined . Neck: Supple, symmetrical, trachea midline, no adenopathy, thyroid: no enlargment/tenderness/nodules   Back:   Symmetric, no curvature. ROM normal.   Lungs:    rales posterior bases and good airflow    Chest wall:  No tenderness or deformity.    Heart:  Regular rate and rhythm, S1, S2 normal, no murmur, click, rub or gallop. Abdomen:   Soft, non-tender. Bowel sounds normal. No masses,  No organomegaly. Extremities: Extremities normal, atraumatic, no cyanosis or edema.    Pulses:  not examined    Skin: Skin color, texture, turgor normal. No rashes or lesions   Lymph nodes: Cervical, supraclavicular nodes normal.   Neurologic: Grossly nonfocal     Data review:     Recent Results (from the past 24 hour(s))   ECHO ADULT COMPLETE    Collection Time: 05/03/21  2:46 PM   Result Value Ref Range    IVSd 1.51 (A) 0.60 - 1.00 cm    LVIDd 4.31 4.20 - 5.90 cm    LVIDs 3.05 cm    LVOT d 2.01 cm    LVPWd 1.46 (A) 0.60 - 1.00 cm    LVOT Peak Gradient 6.98 mmHg    Left Ventricular Outflow Tract Mean Gradient 4.63 mmHg    LVOT .5 mL    LVOT Peak Velocity 132.06 cm/s    LVOT VTI 32.05 cm    RVIDd 4.14 cm    Left Atrium Major Axis 4.07 cm    LA Volume 65.90 18.0 - 58.0 mL    LA Area 4C 21.14 cm2    LA Vol 2C 66.76 (A) 18.00 - 58.00 mL    LA Vol 4C 53.55 18.00 - 58.00 mL    LA Volume DISK BP 60.99 18.0 - 58.0 mL    Aortic Valve Area by Continuity of Peak Velocity 2.44 cm2    Aortic Valve Area by Continuity of VTI 2.72 cm2    Aortic Regurgitant Pressure Half-time 489.53 ms    AR Max Josué 297.39 centimeter/second    AoV PG 11.76 mmHg    Aortic Valve Systolic Mean Gradient 9.99 mmHg    Aortic Valve Systolic Peak Velocity 348.97 cm/s    AoV VTI 37.35 cm    MV A Josué 89.75 centimeter/second    Mitral Valve E Wave Deceleration Time 250.82 ms    MV E Josué 92.26 centimeter/second    LV E' Lateral Velocity 6.78 centimeter/second    LV E' Septal Velocity 8.66 centimeter/second    Mitral Valve Pressure Half-time 72.74 ms    MVA (PHT) 3.02 cm2    Pulmonic Valve Systolic Peak Instantaneous Gradient 4.45 mmHg    Pulmonic Valve Max Velocity 104.73 cm/s    Tapse 2.66 (A) 1.50 - 2.00 cm    Triscuspid Valve Regurgitation Peak Gradient 36.73 mmHg    TR Max Velocity 303.01 cm/s    AO ASC D 2.96 cm    Ao Root D 4.11 cm    LV Mass .0 88.0 - 224.0 g    LV Mass AL Index 128.1 49.0 - 115.0 g/m2    Left Atrium Minor Axis 2.04 cm    LA Vol Index 33.10 16.00 - 28.00 ml/m2    LA Vol Index 33.53 16.00 - 28.00 ml/m2    LA Vol Index 26.90 16.00 - 28.00 ml/m2    KARO/BSA Pk Josué 1.2 cm2/m2    KARO/BSA VTI 1.4 cm2/m2   GLUCOSE, POC    Collection Time: 05/03/21  5:42 PM   Result Value Ref Range    Glucose (POC) 206 (H) 65 - 100 mg/dL    Performed by [place], POC    Collection Time: 05/03/21  9:34 PM   Result Value Ref Range    Glucose (POC) 343 (H) 65 - 100 mg/dL    Performed by Randi Cabello (PCT)    METABOLIC PANEL, COMPREHENSIVE    Collection Time: 05/04/21  2:06 AM   Result Value Ref Range    Sodium 138 136 - 145 mmol/L    Potassium 4.7 3.5 - 5.1 mmol/L    Chloride 110 (H) 97 - 108 mmol/L    CO2 23 21 - 32 mmol/L    Anion gap 5 5 - 15 mmol/L    Glucose 188 (H) 65 - 100 mg/dL    BUN 19 6 - 20 MG/DL    Creatinine 0.70 0.70 - 1.30 MG/DL    BUN/Creatinine ratio 27 (H) 12 - 20      GFR est AA >60 >60 ml/min/1.73m2    GFR est non-AA >60 >60 ml/min/1.73m2    Calcium 8.4 (L) 8.5 - 10.1 MG/DL    Bilirubin, total 0.4 0.2 - 1.0 MG/DL    ALT (SGPT) 38 12 - 78 U/L    AST (SGOT) 15 15 - 37 U/L    Alk.  phosphatase 89 45 - 117 U/L    Protein, total 5.8 (L) 6.4 - 8.2 g/dL    Albumin 2.8 (L) 3.5 - 5.0 g/dL    Globulin 3.0 2.0 - 4.0 g/dL    A-G Ratio 0.9 (L) 1.1 - 2.2     MAGNESIUM    Collection Time: 05/04/21  2:06 AM   Result Value Ref Range    Magnesium 2.4 1.6 - 2.4 mg/dL   CBC WITH AUTOMATED DIFF    Collection Time: 05/04/21  2:06 AM   Result Value Ref Range    WBC 11.3 (H) 4.1 - 11.1 K/uL    RBC 5.01 4.10 - 5.70 M/uL    HGB 12.7 12.1 - 17.0 g/dL    HCT 39.8 36.6 - 50.3 %    MCV 79.4 (L) 80.0 - 99.0 FL    MCH 25.3 (L) 26.0 - 34.0 PG    MCHC 31.9 30.0 - 36.5 g/dL    RDW 18.3 (H) 11.5 - 14.5 %    PLATELET 796 (L) 015 - 400 K/uL    MPV 11.4 8.9 - 12.9 FL    NRBC 0.0 0  WBC    ABSOLUTE NRBC 0.00 0.00 - 0.01 K/uL    NEUTROPHILS 79 (H) 32 - 75 %    LYMPHOCYTES 15 12 - 49 %    MONOCYTES 4 (L) 5 - 13 %    EOSINOPHILS 0 0 - 7 %    BASOPHILS 0 0 - 1 %    IMMATURE GRANULOCYTES 2 (H) 0.0 - 0.5 %    ABS. NEUTROPHILS 9.0 (H) 1.8 - 8.0 K/UL    ABS. LYMPHOCYTES 1.7 0.8 - 3.5 K/UL    ABS. MONOCYTES 0.4 0.0 - 1.0 K/UL    ABS. EOSINOPHILS 0.0 0.0 - 0.4 K/UL    ABS. BASOPHILS 0.0 0.0 - 0.1 K/UL    ABS. IMM. GRANS. 0.2 (H) 0.00 - 0.04 K/UL    DF AUTOMATED     GLUCOSE, POC    Collection Time: 05/04/21  6:20 AM   Result Value Ref Range    Glucose (POC) 215 (H) 65 - 100 mg/dL    Performed by Jose G Joel (PCT)    aSm Cruz, TROUGH    Collection Time: 05/04/21 10:06 AM   Result Value Ref Range    Vancomycin,trough 10.8 (H) 5.0 - 10.0 ug/mL    Reported dose date 20210503      Reported dose time: 2200      Reported dose: 1250 MG UNITS   GLUCOSE, POC    Collection Time: 05/04/21 11:09 AM   Result Value Ref Range    Glucose (POC) 297 (H) 65 - 100 mg/dL    Performed by Carla Hartmann        Imaging:  I have personally reviewed the patients radiographs and have reviewed the reports:  Compared cxr to one from feb - bilateral infiltrates - better than feb - has cxr's in April ordered by primary md - I do not seen in [facility] system.          Luis Carlos Lam MD

## 2021-05-04 NOTE — PROGRESS NOTES
Bedside shift change report given to Regine Llanes (oncoming nurse) by Dave (offgoing nurse). Report included the following information SBAR, Kardex, MAR and Recent Results.

## 2021-05-04 NOTE — ADT AUTH CERT NOTES
Comments  Comment     Last edited by  on  at    Patient Demographics    Patient Name   Suzette Valentine   [id number] Sex   Male    1945 Address   [address] [place] [id number] Phone   [phone] (Home)   [phone] (Mobile) *Preferred*   Patient Demographics    Patient Name   Suzette Valentine   [id number] Sex   Male    1945 Address   [address] [id number] Phone   [phone] (Home)   [phone] (Mobile) *Preferred*   CSN:   [id number]   [address] [place] [address] Date: Admit Time Room Bed   May 2, 2021  4:27  [04511] 01 [[id number]]   Attending Providers    Provider Pager From To   Ibis Branch MD  21   Pilar Watters, DO  21   Ashwin Dyson [phone]   Pilar Watters, DO  21   Ashwin Dyson [phone]    Emergency Contact(s)    Name [address] Spouse [phone]   Utilization Reviews       Pneumonia - Care Day 3 (2021) by Leila Omer RN       Review Entered Review Status   2021 16:25 Completed      Criteria Review      Care Day: 3 Care Date: 2021 Level of Care: Telemetry    Guideline Day 3    Level Of Care    ( ) Floor to discharge [F]    2021 16:25:06 EDT by Ingrid Harris      remote tele    Clinical Status    ( ) * Hemodynamic stability    2021 16:25:06 EDT by Ingrid Harris      P 81  /70,110/57    (X) * Afebrile or temperature acceptable for next level of care    2021 16:25:06 EDT by Ingrid Harris      t 97.7    (X) * Tachypnea absent    2021 16:25:06 EDT by Ingrid Harris      R 18    ( ) * Hypoxemia absent    2021 16:25:06 EDT by Ingrid Harris      97-90% on NC 2L  95% on 1.5 L    (X) * Mental status at baseline    2021 16:25:06 EDT by [facility]:CNII-XII intact.  Alert and oriented X 3.  Fine motor of hands and fingers normal.   equal.  No cogwheeling or rigidity    ( ) * Antibiotic regimen acceptable for next level of care    ( ) * Discharge plans and education understood    Activity    ( ) * Ambulatory or acceptable for next level of care    5/4/2021 16:25:06 EDT by Rajesh Goyla activity as nathan w/assist    Routes    ( ) * Oral hydration, medications, and diet    5/4/2021 16:25:06 EDT by Laina Buck      NS IV @ 75 ml/h  Solumedrol 40 mg IV q8h changed to Prednisone 60 mg po daily  Cardiac diet    Interventions    ( ) * Oxygen absent or at baseline need    5/4/2021 16:25:06 EDT by Rajesh Goyal o2 and wean as able / will need o2 assessment prior to dc    (X) * Isolation not indicated, or is performable at next level of care    (X) WBC    5/4/2021 16:25:06 EDT by Laina Buck      WBC 11.3        (X) Incentive spirometry    5/4/2021 16:25:06 EDT by Laina Buck      order noted    Medications    ( ) Oral antibiotics    5/4/2021 16:25:06 EDT by Laina Buck      Vancomycin 1250 mg IV q12h stopped  Flagyl 500 mg IV q12h continues  Azithromycin 500 mg IV q24 h continues  Cefepime 2 g IV q8h continues    * Milestone   Additional Notes   pulm progress note       Date: 5/4/2021            IMPRESSION:   Admitted with sepsis    pna - ? Cap - procalcitonin is a little elevated    dypnea since covid - worse since last Friday - better today    Reported hypoxia with exertion on 2 liters  ( to 80's ) - sat currently 97 % on 3 liters    S/p covid FEb 2021 ( jw) evidence of post covid syndrome- neg rvp and covid this admit    Chronic anticoagulation for afib - d dimer is ok     hx of afib     dm        RECOMMENDATIONS:   Agree with comprehensive care -    Fu  Urine ag and mycoplasma that are pd - still pd    Fu bc - pd    o2 and wean as able / will need o2 assessment prior to dc     continue currently inhaler- he was on dulera 200 mg at [facility] which he said helped.  Can  Go back to that if he prefers    Jani Hawley be glad to see him post dc if he and primary like for post covid - looks like he had good mgt by Dr. Megha Candelario.     Can wean steroids as tolerated.        Subjective:       This patient has been seen and evaluated at the request of Dr. rashi Bullock for [facility] / [facility] and hypoxia and concern for sepsis . Sabrina Arevalo Patient is a 67 y. o. male  Who denies hx of pulm problems. He was admitted for several days with covid pna in FEb at . He was not seen by par  At the time.  Pt says he thinks he was dc too soon but has per his report been followed closely by his primary md , Dr. Megha Candelario. He has noted persistent sob.  Last Friday he noted increased bojorquez/sob and a hacking cough . He was started  On levaquin per his primary . He presented to er yesterday with  Fever , cxr showed bilateral infiltrates and it was reported that his sats dropped to 80's on nc o2 with exertion .     Overnight he feels better with decreased temp and decreased cough . He had just returned from the bathroom to the chair and off o2 sats were 93 %  On my check . He has been broadly covered with ab.       NO cp, no sinus currently .     NO increased lower ext edema or pain .         NO wheezing.     5/4 - says o2 was on 1 liters ( noted now on 3 liters ) says he  Anticipates dc tomorrow.        Daily Progress Note: 5/4/2021      Assessment/Plan:   Sepsis/Febrile/tachycardia/ tachypnea (HCC) (5/2/2021): Likely due to pneumonia. Chest x-ray with evidence of pneumonia.    -Follow blood cultures. Lactic acid within normal limits. Broad-spectrum antibiotics including IV vancomycin, cefepime Flagyl and azithromycin.  He has completed both Moderna Covid vaccinations.        Hypoxia/dyspnea/cough: Likely due to pneumonia. Patient on full dose anticoagulation which he states he is compliant with doubt PE. POA patient desaturated into the [de-identified] with movement on 2 L nasal cannula patient.    -Incentive spirometry. IV steroids weaned to po. Duo baldev.  Consulted Pulm.        Pneumonia: plan as above.      Atrial fibrillation: History of A. fib; currently in sinus.   - Continue home diltiazem as BP permits. Continue home Eliquis. Gentle IVF.       Thrombocytopenia: likely due to sepsis. Monitor on eliquis.        Hx of COVID: Recently treated for Covid pneumonia in February.       Acquired hypothyroidism (1/14/2016): Check TSH. Continue home Synthroid.       Mixed hyperlipidemia (10/2/2010): Continue with statin.        GERD (gastroesophageal reflux disease) (1/14/2016): Continue PPI       Diabetes mellitus type 2, controlled ([facility] (1/14/2016): Monitor on steroids. Hold home metformin inpt. Insulin sliding scale.       Actinic keratosis (11/15/2016)/ Atopic dermatitis (12/15/2016): Outpatient follow-up. 5/4:  About the same as yesterday.  Still needs O2.  Cough and congestion about the same.  Respiratory viral panel neg.  Tmax 98.  D-dimer ok.  DCed solumedrol and switched to 60mg po Prednisone and wean further as able.         Lungs:     A few scattered rhonchi bilaterally      ECHO ADULT COMPLETE       Result status: Final result    LV: Estimated LVEF is 55 - 60%. Normal cavity size, wall thickness and systolic function (ejection fraction normal). Wall motion: normal. Mild (grade 1) left ventricular diastolic dysfunction. LA: Mildly dilated left atrium   pulm progress note    Date: 5/4/2021            IMPRESSION:   Admitted with sepsis    pna - ?  Cap - procalcitonin is a little elevated    dypnea since covid - worse since last Friday - better today    Reported hypoxia with exertion on 2 liters  ( to 80's ) - sat currently 97 % on 3 liters    S/p covid FEb 2021 ( fifi) evidence of post covid syndrome- neg rvp and covid this admit    Chronic anticoagulation for afib - d dimer is ok     hx of afib     dm        RECOMMENDATIONS:   Agree with comprehensive care -    Fu  Urine ag and mycoplasma that are pd - still pd    Fu bc - pd    o2 and wean as able / will need o2 assessment prior to dc     continue currently inhaler- he was on dulera 200 mg at [facility] which he said helped. Can  Go back to that if he prefers    Par  Will be glad to see him post dc if he and primary like for post covid - looks like he had good mgt by Dr. Hung Funk.     Can wean steroids as tolerated.        Subjective:       This patient has been seen and evaluated at the request of Dr. rashi Freedman for [facility] [place] [facility] and hypoxia and concern for sepsis . Swapna Monique Patient is a 67 y. o. male  Who denies hx of pulm problems. He was admitted for several days with covid pna in FEb at . He was not seen by par  At the time.  Pt says he thinks he was dc too soon but has per his report been followed closely by his primary md , Dr. Hung Funk. He has noted persistent sob.  Last Friday he noted increased bojorquez/sob and a hacking cough . He was started  On levaquin per his primary . He presented to er yesterday with  Fever , cxr showed bilateral infiltrates and it was reported that his sats dropped to 80's on nc o2 with exertion .     Overnight he feels better with decreased temp and decreased cough . He had just returned from the bathroom to the chair and off o2 sats were 93 %  On my check . He has been broadly covered with ab.       NO cp, no sinus currently .     NO increased lower ext edema or pain .         NO wheezing.     5/4 - says o2 was on 1 liters ( noted now on 3 liters ) says he  Anticipates dc tomorrow.     -------------------------------------   Daily Progress Note: 5/4/2021   Assessment/Plan:   Sepsis/Febrile/tachycardia/ tachypnea (HCC) (5/2/2021): Likely due to pneumonia. Chest x-ray with evidence of pneumonia.    -Follow blood cultures. Lactic acid within normal limits. Broad-spectrum antibiotics including IV vancomycin, cefepime Flagyl and azithromycin.  He has completed both Moderna Covid vaccinations.        Hypoxia/dyspnea/cough: Likely due to pneumonia. Patient on full dose anticoagulation which he states he is compliant with doubt PE.  POA patient desaturated into the 80s with movement on 2 L nasal cannula patient.    -Incentive spirometry. IV steroids weaned to po. Duo nebs. Consulted Pulm.        Pneumonia: plan as above.        Atrial fibrillation: History of A. fib; currently in sinus.   - Continue home diltiazem as BP permits. Continue home Eliquis. Gentle IVF.       Thrombocytopenia: likely due to sepsis. Monitor on eliquis.        Hx of COVID: Recently treated for Covid pneumonia in February.       Acquired hypothyroidism (2016): Check TSH. Continue home Synthroid.       Mixed hyperlipidemia (10/2/2010): Continue with statin.        GERD (gastroesophageal reflux disease) (2016): Continue PPI       Diabetes mellitus type 2, controlled ([facility] (2016): Monitor on steroids. Hold home metformin inpt. Insulin sliding scale.       Actinic keratosis (11/15/2016)/ Atopic dermatitis (12/15/2016): Outpatient follow-up. 5/:  About the same as yesterday.  Still needs O2.  Cough and congestion about the same.  Respiratory viral panel neg.  Tmax 98.  D-dimer ok.  DCed solumedrol and switched to 60mg po Prednisone and wean further as able.         Lungs:     A few scattered rhonchi bilaterally      ECHO ADULT COMPLETE       Result status: Final result    LV: Estimated LVEF is 55 - 60%. Normal cavity size, wall thickness and systolic function (ejection fraction normal). Wall motion: normal. Mild (grade 1) left ventricular diastolic dysfunction. LA: Mildly dilated left atrium         PA rec to Keep IP by Chaya Haynes RN       Review Entered Review Status   2021 16:10 In Primary      Criteria Review   We recommend that the following pt's hospitalization under INPATIENT [101] status is APPROPRIATE       Name: Elver Gaines   : 1945   CLARENCE# : [id number]  Insurance: [employer]     Clinical summary     Sepsis/Febrile/tachycardia/ tachypnea, Likely due to pneumonia. Chest x-ray with evidence of pneumonia.  On Broad-spectrum antibiotics including IV vancomycin, cefepime Flagyl and azithromycin. Vitals   Labs and Imaging   MCG criteria applies YES  Comments       The Documentation in the medical record does supports Inpatient Level of care at this time. This decision is based on Admitting diagnosis, clinical and diagnostic findings, Severity of signs and symptoms, Hospital treatment and progress as well as complex medical factors including but not limited to: ,Patient's history, co-morbidities and current medical needs.  Furthermore this patient is at potential risk to develop adverse events and complications from the Initial clinical diagnosis or from  chronic medical conditions         This chart was reviewed at 1:30 PM 5/3/2021    Jose Armando Call MD MD   Physician [facility]

## 2021-05-05 ENCOUNTER — APPOINTMENT (OUTPATIENT)
Dept: GENERAL RADIOLOGY | Age: 76
DRG: 195 | End: 2021-05-05
Attending: FAMILY MEDICINE
Payer: MEDICARE

## 2021-05-05 VITALS
RESPIRATION RATE: 17 BRPM | OXYGEN SATURATION: 95 % | DIASTOLIC BLOOD PRESSURE: 67 MMHG | TEMPERATURE: 97.5 F | BODY MASS INDEX: 28.49 KG/M2 | SYSTOLIC BLOOD PRESSURE: 132 MMHG | HEIGHT: 68 IN | HEART RATE: 70 BPM | WEIGHT: 188 LBS

## 2021-05-05 LAB
ALBUMIN SERPL-MCNC: 2.5 G/DL (ref 3.5–5)
ALBUMIN/GLOB SERPL: 0.8 {RATIO} (ref 1.1–2.2)
ALP SERPL-CCNC: 73 U/L (ref 45–117)
ALT SERPL-CCNC: 40 U/L (ref 12–78)
ANION GAP SERPL CALC-SCNC: 5 MMOL/L (ref 5–15)
AST SERPL-CCNC: 19 U/L (ref 15–37)
BASOPHILS # BLD: 0 K/UL (ref 0–0.1)
BASOPHILS NFR BLD: 0 % (ref 0–1)
BILIRUB SERPL-MCNC: 0.3 MG/DL (ref 0.2–1)
BUN SERPL-MCNC: 20 MG/DL (ref 6–20)
BUN/CREAT SERPL: 28 (ref 12–20)
CALCIUM SERPL-MCNC: 8.4 MG/DL (ref 8.5–10.1)
CHLORIDE SERPL-SCNC: 113 MMOL/L (ref 97–108)
CO2 SERPL-SCNC: 22 MMOL/L (ref 21–32)
CREAT SERPL-MCNC: 0.72 MG/DL (ref 0.7–1.3)
DIFFERENTIAL METHOD BLD: ABNORMAL
EOSINOPHIL # BLD: 0 K/UL (ref 0–0.4)
EOSINOPHIL NFR BLD: 0 % (ref 0–7)
ERYTHROCYTE [DISTWIDTH] IN BLOOD BY AUTOMATED COUNT: 18 % (ref 11.5–14.5)
GLOBULIN SER CALC-MCNC: 3.1 G/DL (ref 2–4)
GLUCOSE BLD STRIP.AUTO-MCNC: 155 MG/DL (ref 65–100)
GLUCOSE BLD STRIP.AUTO-MCNC: 193 MG/DL (ref 65–100)
GLUCOSE BLD STRIP.AUTO-MCNC: 230 MG/DL (ref 65–100)
GLUCOSE SERPL-MCNC: 155 MG/DL (ref 65–100)
HCT VFR BLD AUTO: 36.8 % (ref 36.6–50.3)
HGB BLD-MCNC: 11.5 G/DL (ref 12.1–17)
IMM GRANULOCYTES # BLD AUTO: 0 K/UL
IMM GRANULOCYTES NFR BLD AUTO: 0 %
LYMPHOCYTES # BLD: 1.3 K/UL (ref 0.8–3.5)
LYMPHOCYTES NFR BLD: 10 % (ref 12–49)
MAGNESIUM SERPL-MCNC: 2.4 MG/DL (ref 1.6–2.4)
MCH RBC QN AUTO: 25 PG (ref 26–34)
MCHC RBC AUTO-ENTMCNC: 31.3 G/DL (ref 30–36.5)
MCV RBC AUTO: 80 FL (ref 80–99)
MONOCYTES # BLD: 1.1 K/UL (ref 0–1)
MONOCYTES NFR BLD: 9 % (ref 5–13)
NEUTS SEG # BLD: 10.2 K/UL (ref 1.8–8)
NEUTS SEG NFR BLD: 81 % (ref 32–75)
NRBC # BLD: 0 K/UL (ref 0–0.01)
NRBC BLD-RTO: 0 PER 100 WBC
PLATELET # BLD AUTO: 154 K/UL (ref 150–400)
PMV BLD AUTO: 11.3 FL (ref 8.9–12.9)
POTASSIUM SERPL-SCNC: 4.5 MMOL/L (ref 3.5–5.1)
PROT SERPL-MCNC: 5.6 G/DL (ref 6.4–8.2)
RBC # BLD AUTO: 4.6 M/UL (ref 4.1–5.7)
RBC MORPH BLD: ABNORMAL
SERVICE CMNT-IMP: ABNORMAL
SODIUM SERPL-SCNC: 140 MMOL/L (ref 136–145)
WBC # BLD AUTO: 12.6 K/UL (ref 4.1–11.1)

## 2021-05-05 PROCEDURE — 36415 COLL VENOUS BLD VENIPUNCTURE: CPT

## 2021-05-05 PROCEDURE — 94618 PULMONARY STRESS TESTING: CPT

## 2021-05-05 PROCEDURE — 74011000250 HC RX REV CODE- 250: Performed by: INTERNAL MEDICINE

## 2021-05-05 PROCEDURE — 85025 COMPLETE CBC W/AUTO DIFF WBC: CPT

## 2021-05-05 PROCEDURE — 74011250637 HC RX REV CODE- 250/637: Performed by: FAMILY MEDICINE

## 2021-05-05 PROCEDURE — 77010033678 HC OXYGEN DAILY

## 2021-05-05 PROCEDURE — 74011250636 HC RX REV CODE- 250/636: Performed by: FAMILY MEDICINE

## 2021-05-05 PROCEDURE — 74011636637 HC RX REV CODE- 636/637: Performed by: FAMILY MEDICINE

## 2021-05-05 PROCEDURE — 71045 X-RAY EXAM CHEST 1 VIEW: CPT

## 2021-05-05 PROCEDURE — 94761 N-INVAS EAR/PLS OXIMETRY MLT: CPT

## 2021-05-05 PROCEDURE — 80053 COMPREHEN METABOLIC PANEL: CPT

## 2021-05-05 PROCEDURE — 94640 AIRWAY INHALATION TREATMENT: CPT

## 2021-05-05 PROCEDURE — 94760 N-INVAS EAR/PLS OXIMETRY 1: CPT

## 2021-05-05 PROCEDURE — 74011250637 HC RX REV CODE- 250/637: Performed by: INTERNAL MEDICINE

## 2021-05-05 PROCEDURE — 82962 GLUCOSE BLOOD TEST: CPT

## 2021-05-05 PROCEDURE — 74011250636 HC RX REV CODE- 250/636: Performed by: INTERNAL MEDICINE

## 2021-05-05 PROCEDURE — 83735 ASSAY OF MAGNESIUM: CPT

## 2021-05-05 PROCEDURE — 74011636637 HC RX REV CODE- 636/637: Performed by: INTERNAL MEDICINE

## 2021-05-05 RX ORDER — ACETAMINOPHEN 325 MG/1
650 TABLET ORAL
Qty: 30 TAB | Refills: 0 | Status: SHIPPED
Start: 2021-05-05

## 2021-05-05 RX ORDER — CEFDINIR 300 MG/1
300 CAPSULE ORAL 2 TIMES DAILY
Qty: 10 CAP | Refills: 0 | Status: SHIPPED | OUTPATIENT
Start: 2021-05-05 | End: 2021-05-10

## 2021-05-05 RX ORDER — DOXYCYCLINE HYCLATE 100 MG
100 TABLET ORAL 2 TIMES DAILY
Qty: 10 TAB | Refills: 0 | Status: SHIPPED | OUTPATIENT
Start: 2021-05-05 | End: 2021-05-10

## 2021-05-05 RX ORDER — PREDNISONE 10 MG/1
40 TABLET ORAL
Qty: 40 TAB | Refills: 0 | Status: SHIPPED | OUTPATIENT
Start: 2021-05-05 | End: 2021-06-15

## 2021-05-05 RX ORDER — PREDNISONE 20 MG/1
40 TABLET ORAL DAILY
Status: DISCONTINUED | OUTPATIENT
Start: 2021-05-05 | End: 2021-05-05 | Stop reason: HOSPADM

## 2021-05-05 RX ADMIN — PREDNISONE 40 MG: 20 TABLET ORAL at 09:02

## 2021-05-05 RX ADMIN — CEFEPIME HYDROCHLORIDE 2 G: 2 INJECTION, POWDER, FOR SOLUTION INTRAVENOUS at 04:36

## 2021-05-05 RX ADMIN — IPRATROPIUM BROMIDE AND ALBUTEROL 1 PUFF: 20; 100 SPRAY, METERED RESPIRATORY (INHALATION) at 01:30

## 2021-05-05 RX ADMIN — APIXABAN 5 MG: 5 TABLET, FILM COATED ORAL at 09:02

## 2021-05-05 RX ADMIN — SODIUM CHLORIDE 75 ML/HR: 9 INJECTION, SOLUTION INTRAVENOUS at 02:29

## 2021-05-05 RX ADMIN — METRONIDAZOLE 500 MG: 500 INJECTION, SOLUTION INTRAVENOUS at 09:03

## 2021-05-05 RX ADMIN — VANCOMYCIN HYDROCHLORIDE 1000 MG: 1 INJECTION, POWDER, LYOPHILIZED, FOR SOLUTION INTRAVENOUS at 02:29

## 2021-05-05 RX ADMIN — INSULIN LISPRO 3 UNITS: 100 INJECTION, SOLUTION INTRAVENOUS; SUBCUTANEOUS at 17:02

## 2021-05-05 RX ADMIN — DILTIAZEM HYDROCHLORIDE 60 MG: 30 TABLET, FILM COATED ORAL at 09:02

## 2021-05-05 RX ADMIN — Medication 10 ML: at 06:00

## 2021-05-05 RX ADMIN — METOPROLOL TARTRATE 25 MG: 25 TABLET, FILM COATED ORAL at 09:02

## 2021-05-05 RX ADMIN — LEVOTHYROXINE SODIUM 100 MCG: 0.1 TABLET ORAL at 06:35

## 2021-05-05 RX ADMIN — PANTOPRAZOLE SODIUM 40 MG: 40 TABLET, DELAYED RELEASE ORAL at 06:35

## 2021-05-05 RX ADMIN — CEFEPIME HYDROCHLORIDE 2 G: 2 INJECTION, POWDER, FOR SOLUTION INTRAVENOUS at 12:02

## 2021-05-05 RX ADMIN — VANCOMYCIN HYDROCHLORIDE 1000 MG: 1 INJECTION, POWDER, LYOPHILIZED, FOR SOLUTION INTRAVENOUS at 09:03

## 2021-05-05 RX ADMIN — IPRATROPIUM BROMIDE AND ALBUTEROL 1 PUFF: 20; 100 SPRAY, METERED RESPIRATORY (INHALATION) at 07:38

## 2021-05-05 RX ADMIN — INSULIN LISPRO 2 UNITS: 100 INJECTION, SOLUTION INTRAVENOUS; SUBCUTANEOUS at 12:01

## 2021-05-05 RX ADMIN — IPRATROPIUM BROMIDE AND ALBUTEROL 1 PUFF: 20; 100 SPRAY, METERED RESPIRATORY (INHALATION) at 14:41

## 2021-05-05 RX ADMIN — ATORVASTATIN CALCIUM 10 MG: 10 TABLET, FILM COATED ORAL at 09:02

## 2021-05-05 RX ADMIN — INSULIN LISPRO 2 UNITS: 100 INJECTION, SOLUTION INTRAVENOUS; SUBCUTANEOUS at 09:02

## 2021-05-05 NOTE — DISCHARGE INSTRUCTIONS
Patient Discharge Instructions    Jef Raymond / [id number] : 1945    Admitted 2021 Discharged: 2021 2:54 PM     ACUTE DIAGNOSES:  Sepsis ([facility] [A41.9]    CHRONIC MEDICAL DIAGNOSES:  Problem List as of 2021 Date Reviewed: 2021          Codes Class Noted - Resolved    RESOLVED: Diarrhea ICD-10-CM: R19.7  ICD-9-CM: 787.91 Chronic 2011 - 2017        Sepsis ([facility] ICD-10-CM: A41.9  ICD-9-CM: 038.9, 995.91  2021 - Present        A-fib ([facility] ICD-10-CM: I48.91  ICD-9-CM: 427.31  2021 - Present        Thrombocytopenia ([facility] ICD-10-CM: D69.6  ICD-9-CM: 287.5  2021 - Present        Atopic dermatitis (Chronic) ICD-10-CM: L20.9  ICD-9-CM: 691.8  12/15/2016 - Present        Actinic keratosis (Chronic) ICD-10-CM: L57.0  ICD-9-CM: 702.0  11/15/2016 - Present        Skin cancer of nose (Chronic) ICD-10-CM: C44.301  ICD-9-CM: 173.30  2016 - Present        Spondylosis of lumbar joint (Chronic) ICD-10-CM: M47.816  ICD-9-CM: 721.3  3/14/2016 - Present        Polyposis coli (Chronic) ICD-10-CM: D12.6  ICD-9-CM: 211.3  2016 - Present    Overview Addendum 3/14/2016  3:09 PM by MD Dr. Errol Sam              Chronic back pain (Chronic) ICD-10-CM: M54.9, G89.29  ICD-9-CM: 724.5, 338.29  2016 - Present        Acquired hypothyroidism (Chronic) ICD-10-CM: E03.9  ICD-9-CM: 244.9  2016 - Present        GERD (gastroesophageal reflux disease) (Chronic) ICD-10-CM: K21.9  ICD-9-CM: 530.81  2016 - Present        Diabetes mellitus type 2, controlled ([facility] (Chronic) ICD-10-CM: E11.9  ICD-9-CM: 250.00  2016 - Present        Obese (Chronic) ICD-10-CM: E66.9  ICD-9-CM: 278.00  2011 - Present        Mixed hyperlipidemia (Chronic) ICD-10-CM: E78.2  ICD-9-CM: 272.2  10/2/2010 - Present        RESOLVED: Atrial fibrillation with RVR ([facility] ICD-10-CM: I48.91  ICD-9-CM: 427.31  2017 - 2021        RESOLVED: Laceration ICD-10-CM: KDB1078  ICD-9-CM: 879.8  10/21/2016 - 1/6/2017        RESOLVED: Chest pain ICD-10-CM: R07.9  ICD-9-CM: 786.50  4/29/2016 - 1/6/2017        RESOLVED: Cardiac dysrhythmia ICD-10-CM: I49.9  ICD-9-CM: 427.9  12/21/2011 - 1/6/2017        RESOLVED: UWYSSDHE(897.6) ICD-10-CM: R51  ICD-9-CM: 784.0  12/21/2011 - 1/6/2017        RESOLVED: TIA (transient ischemic attack) ICD-10-CM: G45.9  ICD-9-CM: 435.9  12/21/2011 - 1/6/2017        RESOLVED: Insomnia, unspecified ICD-10-CM: G47.00  ICD-9-CM: 780.52  6/17/2010 - 1/6/2017              DISCHARGE MEDICATIONS:         · It is important that you take the medication exactly as they are prescribed. · Keep your medication in the bottles provided by the pharmacist and keep a list of the medication names, dosages, and times to be taken in your wallet. · Do not take other medications without consulting your doctor. DIET:  Diabetic Diet  ACTIVITY: Activity as tolerated    ADDITIONAL INFORMATION: If you experience any of the following symptoms then please call your primary care physician or return to the emergency room if you cannot get hold of your doctor: Fever, chills, nausea, vomiting, diarrhea, change in mentation, falling, bleeding, shortness of breath. FOLLOW UP CARE:  Dr. Clarke Camargo MD  you are to call and set up an appointment to see them with in 1 week. Follow-up with specialists at directed by them      Information obtained by :  I understand that if any problems occur once I am at home I am to contact my physician. I understand and acknowledge receipt of the instructions indicated above.                                                                                                                                            Physician's or R.N.'s Signature                                                                  Date/Time Patient or Representative Signature                                                          Date/Time

## 2021-05-05 NOTE — DISCHARGE SUMMARY
Physician Discharge Summary     Patient ID:    Bay Rawls  [id number]  67 y.o.  1945  Netta Arteaga MD    Admit date: 5/2/2021  Discharge date and time: 5/5/2021  Admission Diagnoses: Sepsis ([facility] [address] [A41.9]; Pneumonia, COPD exacerbation, Post Covid   Discharge Medications:   Current Discharge Medication List      START taking these medications    Details   acetaminophen (TYLENOL) 325 mg tablet Take 2 Tabs by mouth every six (6) hours as needed for Pain or Fever. otc  Qty: 30 Tab, Refills: 0      predniSONE (DELTASONE) 10 mg tablet Take 40 mg by mouth daily (with breakfast). Daily for now at 40mg. Wean as per Dr Darien Chavez or Pulmonary Dr pineda  Maria Alejandra Maxim: 40 Tab, Refills: 0      cefdinir (OMNICEF) 300 mg capsule Take 1 Cap by mouth two (2) times a day for 5 days. Qty: 10 Cap, Refills: 0      doxycycline (VIBRA-TABS) 100 mg tablet Take 1 Tab by mouth two (2) times a day for 5 days. Qty: 10 Tab, Refills: 0         CONTINUE these medications which have NOT CHANGED    Details   clotrimazole (LOTRIMIN) 1 % topical cream Apply  to affected area two (2) times daily as needed for Skin Irritation or Itching. dilTIAZem IR (CARDIZEM) 60 mg tablet Take 60 mg by mouth two (2) times a day. metoprolol tartrate (LOPRESSOR) 25 mg tablet Take 25 mg by mouth two (2) times a day. cholecalciferol, vitamin D3, (VITAMIN D3) 2,000 unit tab Take 1 Tab by mouth daily. omega 3-DHA-EPA-fish oil 1,000 mg (120 mg-180 mg) capsule Take 1 Cap by mouth daily. clobetasol (TEMOVATE) 0.05 % external solution Apply  to affected area two (2) times daily as needed (Rash/itching). multivitamin (ONE A DAY) tablet Take 1 Tab by mouth daily. ascorbic acid, vitamin C, (VITAMIN C) 500 mg tablet Take 1,000 mg by mouth daily. apixaban (ELIQUIS) 5 mg tablet Take 1 Tab by mouth two (2) times a day.  Indications: PREVENT THROMBOEMBOLISM IN CHRONIC ATRIAL FIBRILLATION  Qty: 180 Tab, Refills: 3    Associated Diagnoses: Persistent atrial fibrillation (HCC)      levothyroxine (SYNTHROID) 100 mcg tablet Take 1 Tab by mouth Daily (before breakfast). Indications: hypothyroidism  Qty: 90 Tab, Refills: 3    Comments: Disregard previous order for 112 mcg tab. Pt had NOT been taking med since Nov and this affected lab test.  Associated Diagnoses: Acquired hypothyroidism      atorvastatin (LIPITOR) 10 mg tablet Take 1 Tab by mouth daily. Indications: hypercholesterolemia  Qty: 90 Tab, Refills: 3    Associated Diagnoses: Hypercholesterolemia      omeprazole (PRILOSEC) 20 mg capsule Take 1 Cap by mouth daily. Qty: 90 Cap, Refills: 3    Associated Diagnoses: Gastroesophageal reflux disease without esophagitis      metFORMIN (GLUCOPHAGE) 1,000 mg tablet Take 1 Tab by mouth two (2) times daily (with meals). Indications: type 2 diabetes mellitus  Qty: 180 Tab, Refills: 3    Associated Diagnoses: Controlled type 2 diabetes mellitus without complication, without long-term current use of insulin (HCC)      aspirin 81 mg chewable tablet Take 1 Tab by mouth daily. Qty: 30 Tab, Refills: 0            Follow up Care:    1. Jaquelin Gooden MD with in 1 weeks  2. specialists as directed. Diet:  Diabetic Diet  Disposition:  Home.   Advanced Directive:  Discharge Exam:  [See today's progress note.]  CONSULTATIONS: Pulmonary/Intensive care    Significant Diagnostic Studies:   Recent Labs     05/05/21 0354 05/04/21  0206   WBC 12.6* 11.3*   HGB 11.5* 12.7   HCT 36.8 39.8    132*     Recent Labs     05/05/21  0354 05/04/21  0206 05/03/21  0350    138 138   K 4.5 4.7 4.2   * 110* 108   CO2 22 23 22   BUN 20 19 14   CREA 0.72 0.70 0.82   * 188* 304*   CA 8.4* 8.4* 7.5*   MG 2.4 2.4 2.0     Recent Labs     05/05/21  0354 05/04/21  0206 05/03/21  0350   ALT 40 38 36   AP 73 89 80   TBILI 0.3 0.4 0.5   TP 5.6* 5.8* 5.4*   ALB 2.5* 2.8* 2.5*   GLOB 3.1 3.0 2.9     Lab Results   Component Value Date/Time    Glucose (POC) 193 (H) 05/05/2021 11:51 AM    Glucose (POC) 155 (H) 05/05/2021 06:43 AM    Glucose (POC) 288 (H) 05/04/2021 09:58 PM    Glucose (POC) 263 (H) 05/04/2021 04:21 PM    Glucose (POC) 297 (H) 05/04/2021 11:09 AM     Lab Results   Component Value Date/Time    TSH 2.77 05/02/2021 05:02 PM    T4, Free 1.17 04/16/2018 09:34 AM     HOSPITAL COURSE & TREATMENT RENDERED:   1. See today's progress note:  Daily Progress Note and Discharge Note: 5/5/2021  Anayeli Gaviria MD         Assessment/Plan:   Sepsis/Febrile/tachycardia/ tachypnea (HCC) (5/2/2021): Likely due to pneumonia. Chest x-ray with evidence of pneumonia. Repeat CXR with resolution  - cultures remain negative. Legonella and mycoplasma neg. Lactic acid within normal limits.   -Broad-spectrum antibiotics including IV vancomycin, cefepime Flagyl and azithromycin initially and then to po Doxy and Omnicef x 5 days at [facility]  -He has completed both Moderna Covid vaccinations. - follow up tomorrow with Dr Dena Gaitan     Hypoxia/dyspnea/cough: Likely due to pneumonia. Patient on full dose anticoagulation which he states he is compliant with doubt PE. POA patient desaturated into the 80s with movement on 2 L nasal cannula patient.   -Incentive spirometry. IV steroids weaned to po - weaning as able with taper at [facility].  - Duo nebs. -Consulted Pulm.      Pneumonia: plan as above.      Atrial fibrillation: History of A. fib; currently in sinus.  - Continue home diltiazem. Continue home Eliquis. Given Gentle IVF.     Thrombocytopenia:  Resolved; likely was due to sepsis. Monitor outpt.      Hx of COVID: Recently treated for Covid pneumonia in February.     Acquired hypothyroidism (1/14/2016): TSH ok. Continue home Synthroid.     Mixed hyperlipidemia (10/2/2010): Continue with statin.      GERD (gastroesophageal reflux disease) (1/14/2016): Continue PPI     Diabetes mellitus type 2, controlled ([address] (1/14/2016): Monitor on steroids. resume home metformin at [facility]. received Insulin sliding scale inpt.     Actinic keratosis (11/15/2016)/ Atopic dermatitis (12/15/2016): Outpatient follow-up.      Problem List:             Problem List as of 5/5/2021 Date Reviewed: 5/2/2021           Codes Class Noted - Resolved     RESOLVED: Diarrhea ICD-10-CM: R19.7  ICD-9-CM: 787.91 Chronic 12/21/2011 - 1/6/2017           Sepsis ([facility] ICD-10-CM: A41.9  ICD-9-CM: 038.9, 995.91   5/2/2021 - Present           [facility]fib ([facility] ICD-10-CM: I48.91  ICD-9-CM: 427.31   5/2/2021 - Present           Thrombocytopenia ([facility] ICD-10-CM: D69.6  ICD-9-CM: 363. 5   5/2/2021 - Present           Atopic dermatitis (Chronic) ICD-10-CM: L20.9  ICD-9-CM: 691.8   12/15/2016 - Present           Actinic keratosis (Chronic) ICD-10-CM: L57.0  ICD-9-CM: 702.0   11/15/2016 - Present           Skin cancer of nose (Chronic) ICD-10-CM: C44.301  ICD-9-CM: 173.30   4/18/2016 - Present           Spondylosis of lumbar joint (Chronic) ICD-10-CM: M47.816  ICD-9-CM: 589. 3   3/14/2016 - Present           Polyposis coli (Chronic) ICD-10-CM: D12.6  ICD-9-CM: 211.3   2/19/2016 - Present     Overview Addendum 3/14/2016  4:75 PM by MD Dr. Elaine Carreon 2016                 Chronic back pain (Chronic) ICD-10-CM: M54.9, G89.29  ICD-9-CM: 724.5, 338.29   1/14/2016 - Present           Acquired hypothyroidism (Chronic) ICD-10-CM: E03.9  ICD-9-CM: 244. 9   1/14/2016 - Present           GERD (gastroesophageal reflux disease) (Chronic) ICD-10-CM: K21.9  ICD-9-CM: 530.81   1/14/2016 - Present           Diabetes mellitus type 2, controlled ([facility] [address] (Chronic) ICD-10-CM: E11.9  ICD-9-CM: 250.00   1/14/2016 - Present           Obese (Chronic) ICD-10-CM: E66.9  ICD-9-CM: 278.00   12/21/2011 - Present           Mixed hyperlipidemia (Chronic) ICD-10-CM: E78.2  ICD-9-CM: 272.2   10/2/2010 - Present           RESOLVED: Atrial fibrillation with RVR (HCC) ICD-10-CM: I48.91  ICD-9-CM: 427.31   1/6/2017 - 5/2/2021           RESOLVED: Laceration ICD-10-CM: XNA3295  ICD-9-CM: 879.8   10/21/2016 - 1/6/2017           RESOLVED: Chest pain ICD-10-CM: R07.9  ICD-9-CM: 786.50   4/29/2016 - 1/6/2017           RESOLVED: Cardiac dysrhythmia ICD-10-CM: I49.9  ICD-9-CM: 427.9   12/21/2011 - 1/6/2017           RESOLVED: TESNRRZY(588.7) ICD-10-CM: R51  ICD-9-CM: 096. 0   12/21/2011 - 1/6/2017           RESOLVED: TIA (transient ischemic attack) ICD-10-CM: G45.9  ICD-9-CM: 435. 9   12/21/2011 - 1/6/2017           RESOLVED: Insomnia, unspecified ICD-10-CM: G47.00  ICD-9-CM: 780.52   6/17/2010 - 1/6/2017                Subjective:    67 y. o. male with a past medical history of A. fib on Eliquis, diabetes, hypothyroidism, hyperlipidemia and Cherise Coffer is admitted with sepsis.  Mr. Gary was diagnosed with Covid pneumonia in February and has suffered from shortness of breath since then. However, he states this past Friday his shortness of breath became worse. He endorses an associated nonproductive cough, wheezing, chills, heart palpitations and blurry vision. He denies any current chest pain or syncope. He states that his primary care doctor recently started him on a course of Levaquin and he recently completed a steroid taper. During our encounter, he desaturates to the 80s on movement in the bed. (Dr Rasheed File)     5/3:  Overall reports he is breathing much better. Still with cough and congestion and \"feeling weak all over. \"  No other complaints. He has completed both Moderna Covid vaccinations. Tmax 101. Tnow 98.7. WBC ok.       5/4:  About the same as yesterday. Still needs O2. Cough and congestion about the same. Respiratory viral panel neg. Tmax 98. D-dimer ok. DCed solumedrol and switched to 60mg po Prednisone and wean further as able.       5/5:  He reports he is feeling much better and wants to go home. Much less cough and congestion he reports. Doing well with incentive spirom. Continuing to wean steroids. Recheck CXR today.   Hopefully home later today if Pulmonary agrees also and help with po antibiotic choice by Pul.  240pm:  Repeat CXR shows clearing of pneumonia. He wants to go home now. .  Discussed weaning IV antibiotics and then home tomorrow if no fever spikes or worsening, but he wants to go home today. Will DC on Omnicef and Doxy, and steroid taper with close follow up. He has appt with Dr Hung andrade at 2pm for follow up. Advised him to also follow up with Pul.       Review of Systems:   A comprehensive review of systems was negative except for that written in the HPI.     Objective:   Physical Exam:   Visit Vitals  BP (!) 118/59 (BP 1 Location: Left upper arm, BP Patient Position: Sitting)   Pulse (!) 49   Temp 97.3 °F (36.3 °C)   Resp 17   Ht 5' 8\" (1.727 m)   Wt 85.3 kg (188 lb)   SpO2 96%   BMI 28.59 kg/m²    O2 Flow Rate (L/min): 1.5 l/min O2 Device: Nasal cannula  Temp (24hrs), Av.6 °F (36.4 °C), Min:97.3 °F (36.3 °C), Max:98 °F (36.7 °C)    1901 -  0700  In: 1920 [P.O.:400; I.V.:1520]  Out: -     07 -  1900  In: 3372.5 [P.O.:580; I.V.:2792.5]  Out: 450 [Urine:450]  General:  Alert, cooperative, no distress, appears stated age. Head:  Normocephalic, without obvious abnormality, atraumatic. Eyes:  Conjunctivae/corneas clear. PERRL, EOMs intact. Neck: Supple, symmetrical, trachea midline, no adenopathy, thyroid: no enlargement/tenderness/nodules, no carotid bruit and no JVD. No accessory muscle use. Lungs:   Much clearer with good air exchange. Chest wall:  No tenderness or deformity. Heart:  Regular rate and rhythm, no murmur, click, rub or gallop. Abdomen:   Soft, non-tender. Bowel sounds normal. No masses,  No organomegaly. Extremities: no cyanosis or edema. No calf tenderness or cords. Pulses: 2+ and symmetric all extremities. Skin: Skin color, texture, turgor normal. No rashes or lesions   Neurologic: CNII-XII intact. Alert and oriented X 3.   Fine motor of hands and fingers normal.  equal.  No cogwheeling or rigidity. Gait not tested at this time. Sensation grossly normal to touch. Gross motor of extremities normal.        Data Review:     EXAM: XR CHEST PORT 5/2/21  INDICATION: Eval for Infiltrate  COMPARISON: February 28  FINDINGS: A portable AP radiograph of the chest was obtained at 1637 hours. The  patient is on a cardiac monitor. There is mild opacification the right upper  lobe and left lower lobe. There are low lung volumes. The cardiac and  mediastinal contours and pulmonary vascularity are normal.  The bones and soft  tissues are grossly within normal limits. There is atherosclerosis of the aorta. IMPRESSION  Mild opacification in the right upper lobe and left lower lobe with  low lung volumes  INDICATION: follow up     EXAM:  AP CHEST RADIOGRAPH 5/5/21  COMPARISON: May 2, 2021  FINDINGS:  AP portable view of the chest demonstrates a normal cardiomediastinal  silhouette. There is no edema, effusion, consolidation, or pneumothorax. The  osseous structures are unremarkable.   IMPRESSION  No acute process     Signed:  Jenny Riedel, MD  5/5/2021  2:54 PM

## 2021-05-05 NOTE — PROGRESS NOTES
05/05/21 1450 05/05/21 1452 05/05/21 1454   RT Walking Oximetry   Stage Resting (Room Air) During Walk (Room Air) During Walk (on O2)   SpO2 90 % (!) 88 % (!) 84 %   HR 80 bpm 87 bpm 94 bpm   Rate of Dyspnea 0 0 0   O2 Device  --   --  Nasal cannula   O2 Flow Rate (L/min) 0 l/min 0 l/min 2 l/min   FIO2 (%) 21 % 21 %  --    Walk/Assistive Device  --  Ambulation Ambulation      05/05/21 1457   RT Walking Oximetry   Stage After Walk   SpO2 91 %   HR 81 bpm   Rate of Dyspnea 0   O2 Device Nasal cannula   O2 Flow Rate (L/min) 2 l/min   FIO2 (%)  --    Walk/Assistive Device  --

## 2021-05-05 NOTE — PROGRESS NOTES
CARE MANAGEMENT   DAILY PROGRESS NOTE        NAME:   Antonio Rodriguez   :     1945   MRN:     [id number]     RUR:  16%  Risk Level:  Low  Value-based purchasing:  No.   Bundle patient:  No.    Transition of care plan:  1. Pt is being followed by [facility]. Pt is on IV Cefepime, IV Flagyl, and IV Vancomycin. Pt's currently on 0.5L of O2.  2. Discharge plan remains for Pt to return home  3. Outpatient follow up. 4. Transportation: Family    3:30PM  CM notified of discharge orders and need for home O2. CM contacted Pt via phone due to isolation precautions to discuss DME. Pt would like to use [facility]. Pt gave CM permission to send medicals to [facility]. CM sent orders and medicals via [employer] to [facility].  [facility] able to accept and notified CM they would be delivering a portable concentrator to nurses station by 6pm. Pt and nurse updated.   _____________________________________  MALENA Mortensen - Care Management  2021   3:00 PM

## 2021-05-05 NOTE — PROGRESS NOTES
Bedside shift change report given to Dave (oncoming nurse) by Armani Dempsey (offgoing nurse). Report included the following information SBAR, Kardex, Intake/Output, MAR and Recent Results.

## 2021-05-05 NOTE — PROGRESS NOTES
Problem: Falls - Risk of  Goal: *Absence of Falls  Description: Document Jaime Cowan Fall Risk and appropriate interventions in the flowsheet.   Outcome: Progressing Towards Goal  Note: Fall Risk Interventions:  Mobility Interventions: Patient to call before getting OOB         Medication Interventions: Teach patient to arise slowly    Elimination Interventions: Patient to call for help with toileting needs    History of Falls Interventions: Room close to nurse's station         Problem: Patient Education: Go to Patient Education Activity  Goal: Patient/Family Education  Outcome: Progressing Towards Goal

## 2021-05-05 NOTE — PROGRESS NOTES
Bedside shift change report given to Ade (oncoming nurse) by Dave (offgoing nurse). Report included the following information SBAR, Kardex, MAR and Recent Results.

## 2021-05-05 NOTE — PROGRESS NOTES
Daily Progress Note and Discharge Note: 5/5/2021  Sera Fontanez MD    Assessment/Plan:   Sepsis/Febrile/tachycardia/ tachypnea [facility]) (5/2/2021): Likely due to pneumonia. Chest x-ray with evidence of pneumonia. Repeat CXR with resolution  - cultures remain negative. Legonella and mycoplasma neg. Lactic acid within normal limits. -Broad-spectrum antibiotics including IV vancomycin, cefepime Flagyl and azithromycin initially and then to po Doxy and Omnicef x 5 days at [place]  -He has completed both Moderna Covid vaccinations. - follow up tomorrow with Dr Aaron Gonzales     Hypoxia/dyspnea/cough: Likely due to pneumonia. Patient on full dose anticoagulation which he states he is compliant with doubt PE. POA patient desaturated into the 80s with movement on 2 L nasal cannula patient.   -Incentive spirometry. IV steroids weaned to po - weaning as able with taper at [place].  - Duo nebs. -Consulted Pulm.      Pneumonia: plan as above.      Atrial fibrillation: History of A. fib; currently in sinus.  - Continue home diltiazem. Continue home Eliquis. Given Gentle IVF.     Thrombocytopenia:  Resolved; likely was due to sepsis. Monitor outpt.      Hx of COVID: Recently treated for Covid pneumonia in February.     Acquired hypothyroidism (1/14/2016): TSH ok. Continue home Synthroid.     Mixed hyperlipidemia (10/2/2010): Continue with statin.      GERD (gastroesophageal reflux disease) (1/14/2016): Continue PPI     Diabetes mellitus type 2, controlled ([facility] [address] (1/14/2016): Monitor on steroids. resume home metformin at [place]. received Insulin sliding scale inpt.     Actinic keratosis (11/15/2016)/ Atopic dermatitis (12/15/2016): Outpatient follow-up.      Problem List:  Problem List as of 5/5/2021 Date Reviewed: 5/2/2021          Codes Class Noted - Resolved    RESOLVED: Diarrhea ICD-10-CM: R19.7  ICD-9-CM: 787.91 Chronic 12/21/2011 - 1/6/2017        Sepsis ([facility] [address] ICD-10-CM: A41.9  ICD-9-CM: 038.9, 995.91  5/2/2021 - Present        A-fib [facility]) ICD-10-CM: I48.91  ICD-9-CM: 427.31  5/2/2021 - Present        Thrombocytopenia ([facility] ICD-10-CM: D69.6  ICD-9-CM: 287.5  5/2/2021 - Present        Atopic dermatitis (Chronic) ICD-10-CM: L20.9  ICD-9-CM: 691.8  12/15/2016 - Present        Actinic keratosis (Chronic) ICD-10-CM: L57.0  ICD-9-CM: 702.0  11/15/2016 - Present        Skin cancer of nose (Chronic) ICD-10-CM: C44.301  ICD-9-CM: 173.30  4/18/2016 - Present        Spondylosis of lumbar joint (Chronic) ICD-10-CM: M47.816  ICD-9-CM: 721.3  3/14/2016 - Present        Polyposis coli (Chronic) ICD-10-CM: D12.6  ICD-9-CM: 211.3  2/19/2016 - Present    Overview Addendum 3/14/2016  1:18 PM by MD Dr. Pari Cesar 2016             Chronic back pain (Chronic) ICD-10-CM: M54.9, G89.29  ICD-9-CM: 724.5, 338.29  1/14/2016 - Present        Acquired hypothyroidism (Chronic) ICD-10-CM: E03.9  ICD-9-CM: 244.9  1/14/2016 - Present        GERD (gastroesophageal reflux disease) (Chronic) ICD-10-CM: K21.9  ICD-9-CM: 530.81  1/14/2016 - Present        Diabetes mellitus type 2, controlled ([facility] (Chronic) ICD-10-CM: E11.9  ICD-9-CM: 250.00  1/14/2016 - Present        Obese (Chronic) ICD-10-CM: E66.9  ICD-9-CM: 278.00  12/21/2011 - Present        Mixed hyperlipidemia (Chronic) ICD-10-CM: E78.2  ICD-9-CM: 272.2  10/2/2010 - Present        RESOLVED: Atrial fibrillation with RVR ([facility] ICD-10-CM: I48.91  ICD-9-CM: 427.31  1/6/2017 - 5/2/2021        RESOLVED: Laceration ICD-10-CM: QIN6676  ICD-9-CM: 879.8  10/21/2016 - 1/6/2017        RESOLVED: Chest pain ICD-10-CM: R07.9  ICD-9-CM: 786.50  4/29/2016 - 1/6/2017        RESOLVED: Cardiac dysrhythmia ICD-10-CM: I49.9  ICD-9-CM: 427.9  12/21/2011 - 1/6/2017        RESOLVED: [facility] ICD-10-CM: R51  ICD-9-CM: 784.0  12/21/2011 - 1/6/2017        RESOLVED: TIA (transient ischemic attack) ICD-10-CM: G45.9  ICD-9-CM: 435.9  12/21/2011 - 1/6/2017        RESOLVED: Insomnia, unspecified ICD-10-CM: G47.00  ICD-9-CM: 780.52  6/17/2010 - 1/6/2017            Subjective:    67 y.o. male with a past medical history of A. fib on Eliquis, diabetes, hypothyroidism, hyperlipidemia and GERD who is admitted with sepsis. Mr. Michelle Heredia was diagnosed with Covid pneumonia in February and has suffered from shortness of breath since then. However, he states this past Friday his shortness of breath became worse. He endorses an associated nonproductive cough, wheezing, chills, heart palpitations and blurry vision. He denies any current chest pain or syncope. He states that his primary care doctor recently started him on a course of Levaquin and he recently completed a steroid taper. During our encounter, he desaturates to the 80s on movement in the bed. (Dr Joce Huang)    5/3:  Overall reports he is breathing much better. Still with cough and congestion and \"feeling weak all over. \"  No other complaints. He has completed both Moderna Covid vaccinations. Tmax 101. Tnow 98.7. WBC ok.      5/4:  About the same as yesterday. Still needs O2. Cough and congestion about the same. Respiratory viral panel neg. Tmax 98. D-dimer ok. DCed solumedrol and switched to 60mg po Prednisone and wean further as able. 5/5:  He reports he is feeling much better and wants to go home. Much less cough and congestion he reports. Doing well with incentive spirom. Continuing to wean steroids. Recheck CXR today. Hopefully home later today if Pulmonary agrees also and help with po antibiotic choice by Pul.  240pm:  Repeat CXR shows clearing of pneumonia. He wants to go home now. .  Discussed weaning IV antibiotics and then home tomorrow if no fever spikes or worsening, but he wants to go home today. Will DC on Omnicef and Doxy, and steroid taper with close follow up. He has appt with Dr Sarahi andrade at 2pm for follow up. Advised him to also follow up with Pul.       Review of Systems:   A comprehensive review of systems was negative except for that written in the HPI. Objective:   Physical Exam:   Visit Vitals  BP (!) 118/59 (BP 1 Location: Left upper arm, BP Patient Position: Sitting)   Pulse (!) 49   Temp 97.3 °F (36.3 °C)   Resp 17   Ht 5' 8\" (1.727 m)   Wt 85.3 kg (188 lb)   SpO2 96%   BMI 28.59 kg/m²    O2 Flow Rate (L/min): 1.5 l/min O2 Device: Nasal cannula  Temp (24hrs), Av.6 °F (36.4 °C), Min:97.3 °F (36.3 °C), Max:98 °F (36.7 °C)    1901 -  0700  In: 1920 [P.O.:400; I.V.:1520]  Out: -     07 -  1900  In: 3372.5 [P.O.:580; I.V.:2792.5]  Out: 450 [Urine:450]  General:  Alert, cooperative, no distress, appears stated age. Head:  Normocephalic, without obvious abnormality, atraumatic. Eyes:  Conjunctivae/corneas clear. PERRL, EOMs intact. Neck: Supple, symmetrical, trachea midline, no adenopathy, thyroid: no enlargement/tenderness/nodules, no carotid bruit and no JVD. No accessory muscle use. Lungs:   Much clearer with good air exchange. Chest wall:  No tenderness or deformity. Heart:  Regular rate and rhythm, no murmur, click, rub or gallop. Abdomen:   Soft, non-tender. Bowel sounds normal. No masses,  No organomegaly. Extremities: no cyanosis or edema. No calf tenderness or cords. Pulses: 2+ and symmetric all extremities. Skin: Skin color, texture, turgor normal. No rashes or lesions   Neurologic: CNII-XII intact. Alert and oriented X 3. Fine motor of hands and fingers normal.   equal.  No cogwheeling or rigidity. Gait not tested at this time. Sensation grossly normal to touch. Gross motor of extremities normal.       Data Review:     EXAM: XR CHEST PORT 21  INDICATION: Eval for Infiltrate  COMPARISON:   FINDINGS: A portable AP radiograph of the chest was obtained at 1637 hours. The  patient is on a cardiac monitor. There is mild opacification the right upper  lobe and left lower lobe. There are low lung volumes.  The cardiac and  mediastinal contours and pulmonary vascularity are normal.  The bones and soft  tissues are grossly within normal limits. There is atherosclerosis of the aorta. IMPRESSION  Mild opacification in the right upper lobe and left lower lobe with  low lung volumes  INDICATION: follow up     EXAM:  AP CHEST RADIOGRAPH 5/5/21  COMPARISON: May 2, 2021  FINDINGS:  AP portable view of the chest demonstrates a normal cardiomediastinal  silhouette. There is no edema, effusion, consolidation, or pneumothorax. The  osseous structures are unremarkable. IMPRESSION  No acute process    . Recent Days:  Recent Labs     05/05/21  0354 05/04/21  0206 05/03/21  0350   WBC 12.6* 11.3* 5.6   HGB 11.5* 12.7 11.9*   HCT 36.8 39.8 38.9    132* 106*     Recent Labs     05/05/21  0354 05/04/21  0206 05/03/21  0350    138 138   K 4.5 4.7 4.2   * 110* 108   CO2 22 23 22   * 188* 304*   BUN 20 19 14   CREA 0.72 0.70 0.82   CA 8.4* 8.4* 7.5*   MG 2.4 2.4 2.0   ALB 2.5* 2.8* 2.5*   TBILI 0.3 0.4 0.5   ALT 40 38 36     No results for input(s): PH, PCO2, PO2, HCO3, FIO2 in the last 72 hours.     24 Hour Results:  Recent Results (from the past 24 hour(s))   VANCOMYCIN, TROUGH    Collection Time: 05/04/21 10:06 AM   Result Value Ref Range    Vancomycin,trough 10.8 (H) 5.0 - 10.0 ug/mL    Reported dose date 20210503      Reported dose time: 2200      Reported dose: 1250 MG UNITS   GLUCOSE, POC    Collection Time: 05/04/21 11:09 AM   Result Value Ref Range    Glucose (POC) 297 (H) 65 - 100 mg/dL    Performed by Puja Ann, POC    Collection Time: 05/04/21  4:21 PM   Result Value Ref Range    Glucose (POC) 263 (H) 65 - 100 mg/dL    Performed by Wolf Andrews (PCT)    GLUCOSE, POC    Collection Time: 05/04/21  9:58 PM   Result Value Ref Range    Glucose (POC) 288 (H) 65 - 100 mg/dL    Performed by Wolf Andrews (PCT)    CBC WITH AUTOMATED DIFF    Collection Time: 05/05/21  3:54 AM   Result Value Ref Range    WBC 12.6 (H) 4.1 - 11.1 K/uL RBC 4.60 4.10 - 5.70 M/uL    HGB 11.5 (L) 12.1 - 17.0 g/dL    HCT 36.8 36.6 - 50.3 %    MCV 80.0 80.0 - 99.0 FL    MCH 25.0 (L) 26.0 - 34.0 PG    MCHC 31.3 30.0 - 36.5 g/dL    RDW 18.0 (H) 11.5 - 14.5 %    PLATELET 250 956 - 055 K/uL    MPV 11.3 8.9 - 12.9 FL    NRBC 0.0 0  WBC    ABSOLUTE NRBC 0.00 0.00 - 0.01 K/uL    NEUTROPHILS 81 (H) 32 - 75 %    LYMPHOCYTES 10 (L) 12 - 49 %    MONOCYTES 9 5 - 13 %    EOSINOPHILS 0 0 - 7 %    BASOPHILS 0 0 - 1 %    IMMATURE GRANULOCYTES 0 %    ABS. NEUTROPHILS 10.2 (H) 1.8 - 8.0 K/UL    ABS. LYMPHOCYTES 1.3 0.8 - 3.5 K/UL    ABS. MONOCYTES 1.1 (H) 0.0 - 1.0 K/UL    ABS. EOSINOPHILS 0.0 0.0 - 0.4 K/UL    ABS. BASOPHILS 0.0 0.0 - 0.1 K/UL    ABS. IMM. GRANS. 0.0 K/UL    DF MANUAL      RBC COMMENTS NORMOCYTIC, NORMOCHROMIC     METABOLIC PANEL, COMPREHENSIVE    Collection Time: 05/05/21  3:54 AM   Result Value Ref Range    Sodium 140 136 - 145 mmol/L    Potassium 4.5 3.5 - 5.1 mmol/L    Chloride 113 (H) 97 - 108 mmol/L    CO2 22 21 - 32 mmol/L    Anion gap 5 5 - 15 mmol/L    Glucose 155 (H) 65 - 100 mg/dL    BUN 20 6 - 20 MG/DL    Creatinine 0.72 0.70 - 1.30 MG/DL    BUN/Creatinine ratio 28 (H) 12 - 20      GFR est AA >60 >60 ml/min/1.73m2    GFR est non-AA >60 >60 ml/min/1.73m2    Calcium 8.4 (L) 8.5 - 10.1 MG/DL    Bilirubin, total 0.3 0.2 - 1.0 MG/DL    ALT (SGPT) 40 12 - 78 U/L    AST (SGOT) 19 15 - 37 U/L    Alk.  phosphatase 73 45 - 117 U/L    Protein, total 5.6 (L) 6.4 - 8.2 g/dL    Albumin 2.5 (L) 3.5 - 5.0 g/dL    Globulin 3.1 2.0 - 4.0 g/dL    A-G Ratio 0.8 (L) 1.1 - 2.2     MAGNESIUM    Collection Time: 05/05/21  3:54 AM   Result Value Ref Range    Magnesium 2.4 1.6 - 2.4 mg/dL       Medications reviewed  Current Facility-Administered Medications   Medication Dose Route Frequency    predniSONE (DELTASONE) tablet 60 mg  60 mg Oral DAILY    vancomycin (VANCOCIN) 1,000 mg in 0.9% sodium chloride 250 mL (VIAL-MATE)  1,000 mg IntraVENous Q8H    apixaban (ELIQUIS) tablet 5 mg  5 mg Oral BID    levothyroxine (SYNTHROID) tablet 100 mcg  100 mcg Oral ACB    dilTIAZem IR (CARDIZEM) tablet 60 mg  60 mg Oral BID    metoprolol tartrate (LOPRESSOR) tablet 25 mg  25 mg Oral Q12H    atorvastatin (LIPITOR) tablet 10 mg  10 mg Oral DAILY    pantoprazole (PROTONIX) tablet 40 mg  40 mg Oral ACB    sodium chloride (NS) flush 5-10 mL  5-10 mL IntraVENous PRN    sodium chloride (NS) flush 5-40 mL  5-40 mL IntraVENous Q8H    sodium chloride (NS) flush 5-40 mL  5-40 mL IntraVENous PRN    acetaminophen (TYLENOL) tablet 650 mg  650 mg Oral Q6H PRN    Or    acetaminophen (TYLENOL) suppository 650 mg  650 mg Rectal Q6H PRN    polyethylene glycol (MIRALAX) packet 17 g  17 g Oral DAILY PRN    promethazine (PHENERGAN) tablet 12.5 mg  12.5 mg Oral Q6H PRN    Or    ondansetron (ZOFRAN) injection 4 mg  4 mg IntraVENous Q6H PRN    insulin lispro (HUMALOG) injection   SubCUTAneous AC&HS    glucose chewable tablet 16 g  4 Tab Oral PRN    dextrose (D50W) injection syrg 12.5-25 g  12.5-25 g IntraVENous PRN    glucagon (GLUCAGEN) injection 1 mg  1 mg IntraMUSCular PRN    metroNIDAZOLE (FLAGYL) IVPB premix 500 mg  500 mg IntraVENous Q12H    ipratropium-albuterol (COMBIVENT RESPIMAT) 20 mcg-100 mcg inhalation spray  1 Puff Inhalation Q6H RT    0.9% sodium chloride infusion  75 mL/hr IntraVENous CONTINUOUS    cefepime (MAXIPIME) 2 g in sterile water (preservative free) 10 mL IV syringe  2 g IntraVENous Q8H     Care Plan discussed with: Patient/Family and Nurse  Total time spent with patient: 30 minutes.   Smith Goltz, MD

## 2021-05-05 NOTE — PROGRESS NOTES
[facility]  Pulmonary, Critical Care, and Sleep Medicine    pulm progress note     Name: Annemarie Engel MRN: [id number]   : 1945 Hospital: [address] [facility]   Date: 2021        IMPRESSION:   · Admitted with sepsis   · pna - ? Cap - procalcitonin is a little elevated   · dypnea since covid - worse since last Friday - better today   · Reported hypoxia with exertion on 2 liters  ( to 80's ) - sat currently 97 % on 3 liters   · S/p covid 2021 ( ) evidence of post covid syndrome- neg rvp and covid this admit   · Chronic anticoagulation for afib - d dimer is ok   ·  hx of afib   ·  dm       RECOMMENDATIONS:   · Wean off O2 as able if sats > 90%  · RT eval for potential home O2 needs  · Continue LABA/ICS inhaler at discharge  · PAR will be glad to see him post dc if he and primary would like for post covid - looks like he had good mgt by Dr. Hunter Ross. · Wean prednisone  · Protonix/Eliquis    Patient is stable from a pulmonary standpoint. We will sign off and will follow up as above if requested per Dr. Hunter Ross. Please call with questions. Subjective: This patient has been seen and evaluated at the request of Dr. rashi Merida for  Bojorquez / Ahmed Limerick and hypoxia and concern for sepsis . Santana Raines Patient is a 67 y.o. male  Who denies hx of pulm problems. He was admitted for several days with covid pna in FEb at . He was not seen by par  At the time. Pt says he thinks he was dc too soon but has per his report been followed closely by his primary md , Dr. Hunter Ross. He has noted persistent sob. Last Friday he noted increased bojorquez/sob and a hacking cough . He was started  On levaquin per his primary . He presented to er yesterday with  Fever , cxr showed bilateral infiltrates and it was reported that his sats dropped to 80's on nc o2 with exertion . Overnight he feels better with decreased temp and decreased cough .  He had just returned from the bathroom to the chair and off o2 sats were 93 %  On my check . He has been broadly covered with ab.      NO cp, no sinus currently . NO increased lower ext edema or pain . NO wheezing. Interval history  Afebrile  VSS  Sats 95% on 0.5L  WBC 12.6  Pna work up neg  Blood cx neg  RVP and COVID neg  ECHO: EF 58-58%; grade 1 diastolic dysfunction    ROS: Feeling better. States he feel back to baseline and is hoping to go home today. Dyspnea improved. Getting 2250 on IS. Denies fever, chills. Cough improved. Denies CP. Denies abd pain. Reports some LE swelling - he thinks due to steroids. Past Medical History:   Diagnosis Date    Acid reflux     Cardiac dysrhythmia, unspecified 12/21/2011    Diabetes ([address]     GERD (gastroesophageal reflux disease)     Headache(784.0) 12/21/2011    Hypercholesterolemia     Hypothyroid     Ill-defined condition     COVID 2/21    Obesity     PUD (peptic ulcer disease)     TIA (transient ischemic attack)     2014      Past Surgical History:   Procedure Laterality Date    HX COLONOSCOPY  03/23/2018    HX GASTRECTOMY  1975    PUD    HX HEENT      left retroorbital tumor resection    HX MOHS PROCEDURES Right     right shoulder    HX TUMOR REMOVAL  2015    Left Eye      Prior to Admission medications    Medication Sig Start Date End Date Taking? Authorizing Provider   clotrimazole (LOTRIMIN) 1 % topical cream Apply  to affected area two (2) times daily as needed for Skin Irritation or Itching. Yes Provider, Historical   dilTIAZem IR (CARDIZEM) 60 mg tablet Take 60 mg by mouth two (2) times a day. Yes Provider, Historical   metoprolol tartrate (LOPRESSOR) 25 mg tablet Take 25 mg by mouth two (2) times a day. Yes Provider, Historical   cholecalciferol, vitamin D3, (VITAMIN D3) 2,000 unit tab Take 1 Tab by mouth daily. Yes Provider, Historical   omega 3-DHA-EPA-fish oil 1,000 mg (120 mg-180 mg) capsule Take 1 Cap by mouth daily.    Yes Provider, Historical   clobetasol (Donel Batter) 0.05 % external solution Apply  to affected area two (2) times daily as needed (Rash/itching). 1/29/18  Yes Provider, Historical   multivitamin (ONE A DAY) tablet Take 1 Tab by mouth daily. Yes Provider, Historical   ascorbic acid, vitamin C, (VITAMIN C) 500 mg tablet Take 1,000 mg by mouth daily. Yes Provider, Historical   apixaban (ELIQUIS) 5 mg tablet Take 1 Tab by mouth two (2) times a day. Indications: PREVENT THROMBOEMBOLISM IN CHRONIC ATRIAL FIBRILLATION 0/79/73  Yes Pham Childers MD   levothyroxine (SYNTHROID) 100 mcg tablet Take 1 Tab by mouth Daily (before breakfast). Indications: hypothyroidism 8/24/61  Yes Pham Childers MD   atorvastatin (LIPITOR) 10 mg tablet Take 1 Tab by mouth daily. Indications: hypercholesterolemia 8/75/29  Yes Pham Childers MD   omeprazole (PRILOSEC) 20 mg capsule Take 1 Cap by mouth daily. 4/02/92  Yes Pham Childers MD   metFORMIN (GLUCOPHAGE) 1,000 mg tablet Take 1 Tab by mouth two (2) times daily (with meals). Indications: type 2 diabetes mellitus 3/00/80  Yes Pham Childers MD   aspirin 81 mg chewable tablet Take 1 Tab by mouth daily.  1/8/17  Yes Elaine Wolff MD   Levaquin   dulera 200mg     Allergies   Allergen Reactions    Ambien [Zolpidem] Other (comments)     Sleep walking  Doing things like cooking but he was not awake      Social History     Tobacco Use    Smoking status: Never Smoker    Smokeless tobacco: Never Used   Substance Use Topics    Alcohol use: No     Alcohol/week: 0.0 standard drinks    remote tobacco - quit in 1970's        Family History   Problem Relation Age of Onset    Diabetes Sister     Cancer Sister         lung    Diabetes Mother     Cancer Father         brain    Diabetes Maternal Grandmother     Hypertension Neg Hx         Current Facility-Administered Medications   Medication Dose Route Frequency    predniSONE (DELTASONE) tablet 40 mg  40 mg Oral DAILY    Vancomycin TROUGH @1730 tonight   Other ONCE    vancomycin (VANCOCIN) 1,000 mg in 0.9% sodium chloride 250 mL (VIAL-MATE)  1,000 mg IntraVENous Q8H    apixaban (ELIQUIS) tablet 5 mg  5 mg Oral BID    levothyroxine (SYNTHROID) tablet 100 mcg  100 mcg Oral ACB    dilTIAZem IR (CARDIZEM) tablet 60 mg  60 mg Oral BID    metoprolol tartrate (LOPRESSOR) tablet 25 mg  25 mg Oral Q12H    atorvastatin (LIPITOR) tablet 10 mg  10 mg Oral DAILY    pantoprazole (PROTONIX) tablet 40 mg  40 mg Oral ACB    sodium chloride (NS) flush 5-40 mL  5-40 mL IntraVENous Q8H    insulin lispro (HUMALOG) injection   SubCUTAneous AC&HS    metroNIDAZOLE (FLAGYL) IVPB premix 500 mg  500 mg IntraVENous Q12H    ipratropium-albuterol (COMBIVENT RESPIMAT) 20 mcg-100 mcg inhalation spray  1 Puff Inhalation Q6H RT    cefepime (MAXIPIME) 2 g in sterile water (preservative free) 10 mL IV syringe  2 g IntraVENous Q8H       Review of Systems:  A comprehensive review of systems was negative except for that written in the HPI. Objective:   Vital Signs:    Visit Vitals  /70 (BP 1 Location: Right upper arm, BP Patient Position: Sitting)   Pulse (!) 58   Temp 97.1 °F (36.2 °C)   Resp 17   Ht 5' 8\" (1.727 m)   Wt 85.3 kg (188 lb)   SpO2 95%   BMI 28.59 kg/m²       O2 Device: None (Room air)   O2 Flow Rate (L/min): 0.5 l/min   Temp (24hrs), Av.6 °F (36.4 °C), Min:97.1 °F (36.2 °C), Max:98 °F (36.7 °C)       Intake/Output:   Last shift:      701 - 1900  In: 420 [P.O.:420]  Out: -   Last 3 shifts: 1901 -  0700  In: 4822 [P.O.:740; I.V.:3040]  Out: -     Intake/Output Summary (Last 24 hours) at 2021 1428  Last data filed at 2021 0949  Gross per 24 hour   Intake 2340 ml   Output    Net 2340 ml      Physical Exam:   General:  Alert, cooperative, no distress, appears stated age. Heavy set    Head:  Normocephalic, without obvious abnormality, atraumatic. Eyes:  Conjunctivae/corneas clear. EOMs intact. Nose: Nares normal.    Throat: Pharynx clear.    Neck: Supple, symmetrical, trachea midline, no adenopathy, thyroid: no enlargment/tenderness/nodules   Lungs:   CTAB, no wheeze or rales appreciated. Chest wall:  No tenderness or deformity. Heart:  Regular rate and rhythm, S1, S2 normal, no murmur, click, rub or gallop. Abdomen:   Soft, non-tender. Bowel sounds normal. No masses,  No organomegaly. Extremities: Extremities normal, atraumatic, no cyanosis or edema. Pulses:  not examined    Skin: Skin color, texture, turgor normal. No rashes or lesions   Lymph nodes: Cervical, supraclavicular nodes normal.   Neurologic: Grossly nonfocal     Data review:     Recent Results (from the past 24 hour(s))   GLUCOSE, POC    Collection Time: 05/04/21  4:21 PM   Result Value Ref Range    Glucose (POC) 263 (H) 65 - 100 mg/dL    Performed by Moon Beaver (PCT)    GLUCOSE, POC    Collection Time: 05/04/21  9:58 PM   Result Value Ref Range    Glucose (POC) 288 (H) 65 - 100 mg/dL    Performed by Moon Beaver (PCT)    CBC WITH AUTOMATED DIFF    Collection Time: 05/05/21  3:54 AM   Result Value Ref Range    WBC 12.6 (H) 4.1 - 11.1 K/uL    RBC 4.60 4.10 - 5.70 M/uL    HGB 11.5 (L) 12.1 - 17.0 g/dL    HCT 36.8 36.6 - 50.3 %    MCV 80.0 80.0 - 99.0 FL    MCH 25.0 (L) 26.0 - 34.0 PG    MCHC 31.3 30.0 - 36.5 g/dL    RDW 18.0 (H) 11.5 - 14.5 %    PLATELET 138 851 - 561 K/uL    MPV 11.3 8.9 - 12.9 FL    NRBC 0.0 0  WBC    ABSOLUTE NRBC 0.00 0.00 - 0.01 K/uL    NEUTROPHILS 81 (H) 32 - 75 %    LYMPHOCYTES 10 (L) 12 - 49 %    MONOCYTES 9 5 - 13 %    EOSINOPHILS 0 0 - 7 %    BASOPHILS 0 0 - 1 %    IMMATURE GRANULOCYTES 0 %    ABS. NEUTROPHILS 10.2 (H) 1.8 - 8.0 K/UL    ABS. LYMPHOCYTES 1.3 0.8 - 3.5 K/UL    ABS. MONOCYTES 1.1 (H) 0.0 - 1.0 K/UL    ABS. EOSINOPHILS 0.0 0.0 - 0.4 K/UL    ABS. BASOPHILS 0.0 0.0 - 0.1 K/UL    ABS. IMM.  GRANS. 0.0 K/UL    DF MANUAL      RBC COMMENTS NORMOCYTIC, NORMOCHROMIC     METABOLIC PANEL, COMPREHENSIVE    Collection Time: 05/05/21  3:54 AM Result Value Ref Range    Sodium 140 136 - 145 mmol/L    Potassium 4.5 3.5 - 5.1 mmol/L    Chloride 113 (H) 97 - 108 mmol/L    CO2 22 21 - 32 mmol/L    Anion gap 5 5 - 15 mmol/L    Glucose 155 (H) 65 - 100 mg/dL    BUN 20 6 - 20 MG/DL    Creatinine 0.72 0.70 - 1.30 MG/DL    BUN/Creatinine ratio 28 (H) 12 - 20      GFR est AA >60 >60 ml/min/1.73m2    GFR est non-AA >60 >60 ml/min/1.73m2    Calcium 8.4 (L) 8.5 - 10.1 MG/DL    Bilirubin, total 0.3 0.2 - 1.0 MG/DL    ALT (SGPT) 40 12 - 78 U/L    AST (SGOT) 19 15 - 37 U/L    Alk.  phosphatase 73 45 - 117 U/L    Protein, total 5.6 (L) 6.4 - 8.2 g/dL    Albumin 2.5 (L) 3.5 - 5.0 g/dL    Globulin 3.1 2.0 - 4.0 g/dL    A-G Ratio 0.8 (L) 1.1 - 2.2     MAGNESIUM    Collection Time: 05/05/21  3:54 AM   Result Value Ref Range    Magnesium 2.4 1.6 - 2.4 mg/dL   GLUCOSE, POC    Collection Time: 05/05/21  6:43 AM   Result Value Ref Range    Glucose (POC) 155 (H) 65 - 100 mg/dL    Performed by Satci Gonzales (PCT)    GLUCOSE, POC    Collection Time: 05/05/21 11:51 AM   Result Value Ref Range    Glucose (POC) 193 (H) 65 - 100 mg/dL    Performed by Karyn Campbell        Imaging:  I have personally reviewed the patients radiographs and have reviewed the reports:  5/5/2021 CXR: Lungs clear, no acute process        Magi Kamaraable, PA

## 2021-05-05 NOTE — PROGRESS NOTES
AVS reviewed with pt and spouse at bedside, verbalize understanding of new medication and follow up appointments, deny any further questions at this time. PIV and tele removed, belongings gathered at bedside. Physical copy of AVS signed and put into chart.  To be d/c after arrival of home O2

## 2021-05-05 NOTE — PROGRESS NOTES
CARE MANAGEMENT   DAILY PROGRESS NOTE        NAME:   Tuan Helton   :     1945   MRN:     [id number]     RUR:  15%  Risk Level:  Low  Value-based purchasing:  No.   Bundle patient:  No.    Transition of care plan:  1. Pt is being followed by [facility]. Pt is currently on IV Zithromax, Maxipime, Flagyl, and Vancomycin. Pt is on 3L of O2.  2. Discharge plan remains for Pt to return home. CM will continue to follow for discharge needs including home O2.  3. Outpatient follow up.   4. Transportation: Family       _____________________________________  MALENA Sauer - Care Management  2021   9:08 PM

## 2021-05-08 LAB
BACTERIA SPEC CULT: NORMAL
BACTERIA SPEC CULT: NORMAL
SERVICE CMNT-IMP: NORMAL
SERVICE CMNT-IMP: NORMAL

## 2021-06-15 ENCOUNTER — HOSPITAL ENCOUNTER (EMERGENCY)
Age: 76
Discharge: HOME | End: 2021-06-15
Attending: EMERGENCY MEDICINE
Payer: MEDICARE

## 2021-06-15 VITALS
RESPIRATION RATE: 16 BRPM | TEMPERATURE: 96.7 F | DIASTOLIC BLOOD PRESSURE: 76 MMHG | OXYGEN SATURATION: 96 % | SYSTOLIC BLOOD PRESSURE: 176 MMHG | HEART RATE: 63 BPM

## 2021-06-15 DIAGNOSIS — S05.01XA ABRASION OF RIGHT CORNEA, INITIAL ENCOUNTER: Primary | ICD-10-CM

## 2021-06-15 PROCEDURE — 74011000250 HC RX REV CODE- 250: Performed by: EMERGENCY MEDICINE

## 2021-06-15 PROCEDURE — 99284 EMERGENCY DEPT VISIT MOD MDM: CPT

## 2021-06-15 RX ORDER — KETOROLAC TROMETHAMINE 5 MG/ML
1 SOLUTION OPHTHALMIC
Qty: 1 BOTTLE | Refills: 0 | Status: SHIPPED | OUTPATIENT
Start: 2021-06-15 | End: 2021-06-20

## 2021-06-15 RX ORDER — TETRACAINE HYDROCHLORIDE 5 MG/ML
1 SOLUTION OPHTHALMIC
Status: COMPLETED | OUTPATIENT
Start: 2021-06-15 | End: 2021-06-15

## 2021-06-15 RX ORDER — POLYMYXIN B SULFATE AND TRIMETHOPRIM 1; 10000 MG/ML; [USP'U]/ML
1 SOLUTION OPHTHALMIC EVERY 4 HOURS
Qty: 10 ML | Refills: 0 | Status: SHIPPED | OUTPATIENT
Start: 2021-06-15

## 2021-06-15 RX ADMIN — FLUORESCEIN SODIUM 1 STRIP: 1 STRIP OPHTHALMIC at 07:38

## 2021-06-15 RX ADMIN — TETRACAINE HYDROCHLORIDE 1 DROP: 5 SOLUTION OPHTHALMIC at 07:38

## 2021-06-15 NOTE — ED NOTES
Pt was discharged and given instructions by Dr Estefani Castaneda. Pt verbalized good understanding of all discharge instructions,prescriptions and F/U care. All questions answered. Pt in stable condition on discharge.

## 2021-06-15 NOTE — ED PROVIDER NOTES
50-year-old male with a history of diabetes, hypercholesterolemia, hypothyroidism, peptic ulcer disease, TIA, and cardiac dysrhythmia has suffered with dry eyes since having Covid a few months back and accidentally poked his right eye with the eye dropper this morning when trying to install drops for the dry eyes. He is having some pain that limits his ability to open his eye and his vision is not normal in the right eye. Left eye feels fine. No discharge or bleeding. No contact lenses.            Past Medical History:   Diagnosis Date    Acid reflux     Cardiac dysrhythmia, unspecified 12/21/2011    Diabetes ([address]     GERD (gastroesophageal reflux disease)     Headache(784.0) 12/21/2011    Hypercholesterolemia     Hypothyroid     Ill-defined condition     COVID 2/21    Obesity     PUD (peptic ulcer disease)     TIA (transient ischemic attack)     2014       Past Surgical History:   Procedure Laterality Date    HX COLONOSCOPY  03/23/2018    HX GASTRECTOMY  1975    PUD    HX HEENT      left retroorbital tumor resection    HX MOHS PROCEDURES Right     right shoulder    HX TUMOR REMOVAL  2015    Left Eye         Family History:   Problem Relation Age of Onset    Diabetes Sister     Cancer Sister         lung    Diabetes Mother     Cancer Father         brain    Diabetes Maternal Grandmother     Hypertension Neg Hx        Social History     Socioeconomic History    Marital status:      Spouse name: Not on file    Number of children: Not on file    Years of education: Not on file    Highest education level: Not on file   Occupational History    Not on file   Tobacco Use    Smoking status: Never Smoker    Smokeless tobacco: Never Used   Substance and Sexual Activity    Alcohol use: No     Alcohol/week: 0.0 standard drinks    Drug use: No    Sexual activity: Never     Partners: Female   Other Topics Concern    Not on file   Social History Narrative    Not on file     Social Determinants of Health     Financial Resource Strain:     Difficulty of Paying Living Expenses:    Food Insecurity:     Worried About Running Out of Food in the Last Year:     [address] in the Last Year:    Transportation Needs:     Lack of Transportation (Medical):  Lack of Transportation (Non-Medical):    Physical Activity:     Days of Exercise per Week:     Minutes of Exercise per Session:    Stress:     Feeling of Stress :    Social Connections:     Frequency of Communication with Friends and Family:     Frequency of Social Gatherings with Friends and Family:     Attends Mu-ism Services:     Active Member of Clubs or Organizations:     Attends Club or Organization Meetings:     Marital Status:    Intimate Partner Violence:     Fear of Current or Ex-Partner:     Emotionally Abused:     Physically Abused:     Sexually Abused: ALLERGIES: Ambien [zolpidem]    Review of Systems   Constitutional: Negative for fever. HENT: Negative for trouble swallowing. Eyes: Positive for photophobia, pain and redness. Negative for visual disturbance. Respiratory: Negative for cough. Cardiovascular: Negative for chest pain. Gastrointestinal: Negative for abdominal pain. Genitourinary: Negative for difficulty urinating. Musculoskeletal: Negative for gait problem. Skin: Negative for rash. Neurological: Negative for headaches. Hematological: Does not bruise/bleed easily. Psychiatric/Behavioral: Negative for sleep disturbance. Vitals:    06/15/21 0731   BP: (!) 162/81   Pulse: 63   Resp: 16   Temp: (!) 96.7 °F (35.9 °C)   SpO2: 97%            Physical Exam  Constitutional:       Appearance: Normal appearance. HENT:      Head: Normocephalic. Nose: Nose normal.      Mouth/Throat:      Mouth: Mucous membranes are moist.   Eyes:      General: Lids are normal. Lids are everted, no foreign bodies appreciated. Right eye: Discharge present. No foreign body. Extraocular Movements: Extraocular movements intact. Conjunctiva/sclera:      Right eye: Right conjunctiva is injected. Pupils: Pupils are equal, round, and reactive to light. Right eye: Corneal abrasion and fluorescein uptake present. Jr exam negative. Cardiovascular:      Rate and Rhythm: Normal rate. Pulmonary:      Effort: Pulmonary effort is normal. No respiratory distress. Abdominal:      General: Abdomen is flat. Musculoskeletal:         General: Normal range of motion. Skin:     Findings: No rash. Neurological:      General: No focal deficit present. Mental Status: He is alert. Psychiatric:         Behavior: Behavior normal.          MDM  Number of Diagnoses or Management Options  Abrasion of right cornea, initial encounter  Diagnosis management comments: Relief with tetracaine  Abrasion seen with no foreign body and no evidence of rupture of the globe  Polytrim and Toradol eyedrops and ophthalmology follow-up in the next few days. He already has an appointment with Dr. Curtis Tatum.          Procedures

## 2021-08-18 ENCOUNTER — APPOINTMENT (OUTPATIENT)
Dept: CT IMAGING | Age: 76
End: 2021-08-18
Attending: EMERGENCY MEDICINE
Payer: MEDICARE

## 2021-08-18 ENCOUNTER — HOSPITAL ENCOUNTER (EMERGENCY)
Age: 76
Discharge: HOME | End: 2021-08-18
Attending: EMERGENCY MEDICINE
Payer: MEDICARE

## 2021-08-18 VITALS
RESPIRATION RATE: 17 BRPM | WEIGHT: 191 LBS | SYSTOLIC BLOOD PRESSURE: 151 MMHG | TEMPERATURE: 98.2 F | HEART RATE: 56 BPM | BODY MASS INDEX: 28.95 KG/M2 | HEIGHT: 68 IN | OXYGEN SATURATION: 97 % | DIASTOLIC BLOOD PRESSURE: 61 MMHG

## 2021-08-18 DIAGNOSIS — G45.9 TIA (TRANSIENT ISCHEMIC ATTACK): ICD-10-CM

## 2021-08-18 DIAGNOSIS — R47.81 SLURRED SPEECH: Primary | ICD-10-CM

## 2021-08-18 LAB
ALBUMIN SERPL-MCNC: 3.5 G/DL (ref 3.5–5)
ALBUMIN/GLOB SERPL: 1.1 {RATIO} (ref 1.1–2.2)
ALP SERPL-CCNC: 173 U/L (ref 45–117)
ALT SERPL-CCNC: 36 U/L (ref 12–78)
ANION GAP SERPL CALC-SCNC: 11 MMOL/L (ref 5–15)
AST SERPL-CCNC: 26 U/L (ref 15–37)
BASOPHILS # BLD: 0 K/UL (ref 0–0.1)
BASOPHILS NFR BLD: 1 % (ref 0–1)
BILIRUB SERPL-MCNC: 0.2 MG/DL (ref 0.2–1)
BUN SERPL-MCNC: 17 MG/DL (ref 6–20)
BUN/CREAT SERPL: 15 (ref 12–20)
CALCIUM SERPL-MCNC: 8.7 MG/DL (ref 8.5–10.1)
CHLORIDE SERPL-SCNC: 108 MMOL/L (ref 97–108)
CO2 SERPL-SCNC: 26 MMOL/L (ref 21–32)
CREAT SERPL-MCNC: 1.11 MG/DL (ref 0.7–1.3)
DIFFERENTIAL METHOD BLD: ABNORMAL
EOSINOPHIL # BLD: 0.4 K/UL (ref 0–0.4)
EOSINOPHIL NFR BLD: 6 % (ref 0–7)
ERYTHROCYTE [DISTWIDTH] IN BLOOD BY AUTOMATED COUNT: 14.2 % (ref 11.5–14.5)
GLOBULIN SER CALC-MCNC: 3.2 G/DL (ref 2–4)
GLUCOSE BLD STRIP.AUTO-MCNC: 116 MG/DL (ref 65–117)
GLUCOSE SERPL-MCNC: 85 MG/DL (ref 65–100)
HCT VFR BLD AUTO: 43.2 % (ref 36.6–50.3)
HGB BLD-MCNC: 13.3 G/DL (ref 12.1–17)
IMM GRANULOCYTES # BLD AUTO: 0 K/UL (ref 0–0.04)
IMM GRANULOCYTES NFR BLD AUTO: 0 % (ref 0–0.5)
INR PPP: 1 (ref 0.9–1.1)
LYMPHOCYTES # BLD: 2.5 K/UL (ref 0.8–3.5)
LYMPHOCYTES NFR BLD: 36 % (ref 12–49)
MCH RBC QN AUTO: 23.6 PG (ref 26–34)
MCHC RBC AUTO-ENTMCNC: 30.8 G/DL (ref 30–36.5)
MCV RBC AUTO: 76.6 FL (ref 80–99)
MONOCYTES # BLD: 0.8 K/UL (ref 0–1)
MONOCYTES NFR BLD: 12 % (ref 5–13)
NEUTS SEG # BLD: 3.1 K/UL (ref 1.8–8)
NEUTS SEG NFR BLD: 45 % (ref 32–75)
NRBC # BLD: 0 K/UL (ref 0–0.01)
NRBC BLD-RTO: 0 PER 100 WBC
PLATELET # BLD AUTO: 159 K/UL (ref 150–400)
PMV BLD AUTO: 12 FL (ref 8.9–12.9)
POTASSIUM SERPL-SCNC: 4.5 MMOL/L (ref 3.5–5.1)
PROT SERPL-MCNC: 6.7 G/DL (ref 6.4–8.2)
PROTHROMBIN TIME: 9.9 SEC (ref 9–11.1)
RBC # BLD AUTO: 5.64 M/UL (ref 4.1–5.7)
SERVICE CMNT-IMP: NORMAL
SODIUM SERPL-SCNC: 145 MMOL/L (ref 136–145)
TROPONIN I SERPL-MCNC: <0.05 NG/ML
WBC # BLD AUTO: 6.9 K/UL (ref 4.1–11.1)

## 2021-08-18 PROCEDURE — 82962 GLUCOSE BLOOD TEST: CPT

## 2021-08-18 PROCEDURE — 84484 ASSAY OF TROPONIN QUANT: CPT

## 2021-08-18 PROCEDURE — 36415 COLL VENOUS BLD VENIPUNCTURE: CPT

## 2021-08-18 PROCEDURE — 85025 COMPLETE CBC W/AUTO DIFF WBC: CPT

## 2021-08-18 PROCEDURE — 93005 ELECTROCARDIOGRAM TRACING: CPT

## 2021-08-18 PROCEDURE — 85610 PROTHROMBIN TIME: CPT

## 2021-08-18 PROCEDURE — 99285 EMERGENCY DEPT VISIT HI MDM: CPT

## 2021-08-18 PROCEDURE — 80053 COMPREHEN METABOLIC PANEL: CPT

## 2021-08-18 PROCEDURE — 70450 CT HEAD/BRAIN W/O DYE: CPT
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
## 2021-08-18 NOTE — DISCHARGE INSTRUCTIONS
Return to the emergency department if you change your mind about being admitted to the hospital.  You are choosing to leave [address] You understand that the risks are death and permanent disability. Please return if you change your mind. Otherwise follow-up with your doctor first thing in the morning.
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
## 2021-08-19 LAB
ATRIAL RATE: 59 BPM
CALCULATED P AXIS, ECG09: 42 DEGREES
CALCULATED T AXIS, ECG11: 49 DEGREES
DIAGNOSIS, 93000: NORMAL
P-R INTERVAL, ECG05: 172 MS
Q-T INTERVAL, ECG07: 390 MS
QRS DURATION, ECG06: 74 MS
QTC CALCULATION (BEZET), ECG08: 386 MS
VENTRICULAR RATE, ECG03: 59 BPM

## 2021-08-19 NOTE — ED NOTES
Patient left AMA and will follow up with primary care physician on 8/19/21. Patient discharged by provider. D/C instructions given. Patient verbalized understanding, and risks involved with refusing admission and further treatment. PIV removed, pressure dressing applied. Patient ambulated out of ER without difficulty, NAD.   Patient Vitals for the past 4 hrs:   Temp Pulse Resp BP SpO2   08/18/21 2000  (!) 56 17 (!) 151/61 97 %   08/18/21 1945  (!) 58 16  97 %   08/18/21 1930  (!) 52 21  93 %   08/18/21 1915  (!) 53 19  95 %   08/18/21 1900  (!) 54 23  94 %   08/18/21 1845  (!) 55 14  94 %   08/18/21 1830  (!) 56 15  92 %   08/18/21 1815  (!) 58 14  95 %   08/18/21 1800 98.2 °F (36.8 °C) (!) 57 13 (!) 150/67 94 %   08/18/21 1745  (!) 58 13 (!) 150/67 96 %

## 2022-03-18 PROBLEM — D69.6 THROMBOCYTOPENIA (HCC): Status: ACTIVE | Noted: 2021-05-02

## 2022-03-19 PROBLEM — I48.91 A-FIB (HCC): Status: ACTIVE | Noted: 2021-05-02

## 2022-03-19 PROBLEM — A41.9 SEPSIS (HCC): Status: ACTIVE | Noted: 2021-05-02

## 2022-11-28 ENCOUNTER — TRANSCRIBE ORDER (OUTPATIENT)
Dept: SCHEDULING | Age: 77
End: 2022-11-28

## 2022-11-28 DIAGNOSIS — S46.012A TRAUMATIC INCOMPLETE TEAR OF LEFT ROTATOR CUFF: Primary | ICD-10-CM

## 2022-12-03 ENCOUNTER — HOSPITAL ENCOUNTER (OUTPATIENT)
Dept: MRI IMAGING | Age: 77
End: 2022-12-03
Attending: PHYSICIAN ASSISTANT
Payer: MEDICARE

## 2022-12-03 DIAGNOSIS — S46.012A TRAUMATIC INCOMPLETE TEAR OF LEFT ROTATOR CUFF: ICD-10-CM

## 2022-12-03 PROCEDURE — 73221 MRI JOINT UPR EXTREM W/O DYE: CPT

## 2023-07-28 ENCOUNTER — APPOINTMENT (OUTPATIENT)
Facility: HOSPITAL | Age: 78
End: 2023-07-28
Payer: COMMERCIAL

## 2023-07-28 ENCOUNTER — HOSPITAL ENCOUNTER (EMERGENCY)
Facility: HOSPITAL | Age: 78
Discharge: HOME | End: 2023-07-28
Attending: EMERGENCY MEDICINE
Payer: COMMERCIAL

## 2023-07-28 VITALS
OXYGEN SATURATION: 93 % | SYSTOLIC BLOOD PRESSURE: 144 MMHG | WEIGHT: 179.45 LBS | HEART RATE: 57 BPM | TEMPERATURE: 98 F | BODY MASS INDEX: 27.2 KG/M2 | HEIGHT: 68 IN | DIASTOLIC BLOOD PRESSURE: 64 MMHG | RESPIRATION RATE: 16 BRPM
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
## 2023-07-28 DIAGNOSIS — S43.101A AC SEPARATION, RIGHT, INITIAL ENCOUNTER: Primary | ICD-10-CM

## 2023-07-28 DIAGNOSIS — R55 NEAR SYNCOPE: ICD-10-CM

## 2023-07-28 LAB
ALBUMIN SERPL-MCNC: 3.5 G/DL (ref 3.5–5)
ALBUMIN/GLOB SERPL: 1.1 (ref 1.1–2.2)
ALP SERPL-CCNC: 161 U/L (ref 45–117)
ALT SERPL-CCNC: 40 U/L (ref 12–78)
ANION GAP SERPL CALC-SCNC: 10 MMOL/L (ref 5–15)
AST SERPL-CCNC: 26 U/L (ref 15–37)
BASOPHILS # BLD: 0.1 K/UL (ref 0–0.1)
BASOPHILS NFR BLD: 1 % (ref 0–1)
BILIRUB SERPL-MCNC: 0.3 MG/DL (ref 0.2–1)
BUN SERPL-MCNC: 15 MG/DL (ref 6–20)
BUN/CREAT SERPL: 13 (ref 12–20)
CALCIUM SERPL-MCNC: 9.1 MG/DL (ref 8.5–10.1)
CHLORIDE SERPL-SCNC: 106 MMOL/L (ref 97–108)
CO2 SERPL-SCNC: 25 MMOL/L (ref 21–32)
CREAT SERPL-MCNC: 1.12 MG/DL (ref 0.7–1.3)
DIFFERENTIAL METHOD BLD: ABNORMAL
EOSINOPHIL # BLD: 0.2 K/UL (ref 0–0.4)
EOSINOPHIL NFR BLD: 3 % (ref 0–7)
ERYTHROCYTE [DISTWIDTH] IN BLOOD BY AUTOMATED COUNT: 18.3 % (ref 11.5–14.5)
GLOBULIN SER CALC-MCNC: 3.2 G/DL (ref 2–4)
GLUCOSE SERPL-MCNC: 134 MG/DL (ref 65–100)
HCT VFR BLD AUTO: 40.3 % (ref 36.6–50.3)
HGB BLD-MCNC: 12.3 G/DL (ref 12.1–17)
IMM GRANULOCYTES # BLD AUTO: 0.1 K/UL (ref 0–0.04)
IMM GRANULOCYTES NFR BLD AUTO: 1 % (ref 0–0.5)
LYMPHOCYTES # BLD: 2.6 K/UL (ref 0.8–3.5)
LYMPHOCYTES NFR BLD: 33 % (ref 12–49)
MAGNESIUM SERPL-MCNC: 1.9 MG/DL (ref 1.6–2.4)
MCH RBC QN AUTO: 22 PG (ref 26–34)
MCHC RBC AUTO-ENTMCNC: 30.5 G/DL (ref 30–36.5)
MCV RBC AUTO: 72.2 FL (ref 80–99)
MONOCYTES # BLD: 0.8 K/UL (ref 0–1)
MONOCYTES NFR BLD: 10 % (ref 5–13)
NEUTS SEG # BLD: 4.3 K/UL (ref 1.8–8)
NEUTS SEG NFR BLD: 53 % (ref 32–75)
NRBC # BLD: 0 K/UL (ref 0–0.01)
NRBC BLD-RTO: 0 PER 100 WBC
PLATELET # BLD AUTO: 174 K/UL (ref 150–400)
POTASSIUM SERPL-SCNC: 4.1 MMOL/L (ref 3.5–5.1)
PROT SERPL-MCNC: 6.7 G/DL (ref 6.4–8.2)
RBC # BLD AUTO: 5.58 M/UL (ref 4.1–5.7)
SODIUM SERPL-SCNC: 141 MMOL/L (ref 136–145)
TROPONIN I SERPL HS-MCNC: 11 NG/L (ref 0–76)
WBC # BLD AUTO: 8 K/UL (ref 4.1–11.1)

## 2023-07-28 PROCEDURE — 6370000000 HC RX 637 (ALT 250 FOR IP): Performed by: EMERGENCY MEDICINE

## 2023-07-28 PROCEDURE — 70450 CT HEAD/BRAIN W/O DYE: CPT

## 2023-07-28 PROCEDURE — 83735 ASSAY OF MAGNESIUM: CPT

## 2023-07-28 PROCEDURE — 99285 EMERGENCY DEPT VISIT HI MDM: CPT

## 2023-07-28 PROCEDURE — 6360000002 HC RX W HCPCS: Performed by: EMERGENCY MEDICINE

## 2023-07-28 PROCEDURE — 36415 COLL VENOUS BLD VENIPUNCTURE: CPT

## 2023-07-28 PROCEDURE — 73030 X-RAY EXAM OF SHOULDER: CPT

## 2023-07-28 PROCEDURE — 72125 CT NECK SPINE W/O DYE: CPT

## 2023-07-28 PROCEDURE — 96374 THER/PROPH/DIAG INJ IV PUSH: CPT

## 2023-07-28 PROCEDURE — 85025 COMPLETE CBC W/AUTO DIFF WBC: CPT

## 2023-07-28 PROCEDURE — 84484 ASSAY OF TROPONIN QUANT: CPT

## 2023-07-28 PROCEDURE — 93005 ELECTROCARDIOGRAM TRACING: CPT | Performed by: EMERGENCY MEDICINE

## 2023-07-28 PROCEDURE — 80053 COMPREHEN METABOLIC PANEL: CPT

## 2023-07-28 RX ORDER — CYCLOBENZAPRINE HCL 10 MG
10 TABLET ORAL
Status: COMPLETED | OUTPATIENT
Start: 2023-07-28 | End: 2023-07-28

## 2023-07-28 RX ORDER — MORPHINE SULFATE 4 MG/ML
4 INJECTION, SOLUTION INTRAMUSCULAR; INTRAVENOUS
Status: COMPLETED | OUTPATIENT
Start: 2023-07-28 | End: 2023-07-28

## 2023-07-28 RX ORDER — OXYCODONE HYDROCHLORIDE 5 MG/1
5 TABLET ORAL EVERY 6 HOURS PRN
Qty: 12 TABLET | Refills: 0 | Status: SHIPPED | OUTPATIENT
Start: 2023-07-28 | End: 2023-07-29 | Stop reason: ALTCHOICE

## 2023-07-28 RX ADMIN — MORPHINE SULFATE 4 MG: 4 INJECTION INTRAVENOUS at 21:12

## 2023-07-28 RX ADMIN — CYCLOBENZAPRINE 10 MG: 10 TABLET, FILM COATED ORAL at 22:23

## 2023-07-28 ASSESSMENT — PAIN DESCRIPTION - LOCATION: LOCATION: SHOULDER

## 2023-07-28 ASSESSMENT — PAIN SCALES - GENERAL
PAINLEVEL_OUTOF10: 10

## 2023-07-28 ASSESSMENT — PAIN DESCRIPTION - ORIENTATION: ORIENTATION: RIGHT

## 2023-07-29 RX ORDER — OXYCODONE HYDROCHLORIDE AND ACETAMINOPHEN 5; 325 MG/1; MG/1
1 TABLET ORAL EVERY 6 HOURS PRN
Qty: 12 TABLET | Refills: 0 | Status: SHIPPED | OUTPATIENT
Start: 2023-07-29 | End: 2023-08-01

## 2023-07-29 NOTE — ED TRIAGE NOTES
Pt utilizes wheelchair to arrive in treatment area.   Pt states that 30 min ago he fell onto the right shoulder

## 2023-07-29 NOTE — ED NOTES
Pt discharged home with wife, verbalized understanding of discharge instructions and prescription e-scribed, pt ambulated out of dept with steady gait accompanied by wife.      Sameera Wolf RN  07/28/23 1485

## 2023-07-29 NOTE — ED PROVIDER NOTES
ordered. Risk  Prescription drug management. 76-year-old man presenting with the above chief complaint. Stable vital signs. On exam he does have a bony deformity around [facility] on the right. No other appreciable signs of trauma on exam.  Given his age and anticoagulation I am going to order a head and cervical spine CT in addition to right shoulder x-ray. His presyncopal symptoms sound orthostatic in nature but cardiac work-up will be initiated as well. REASSESSMENT     ED Course as of 07/29/23 0506   Fri Jul 28, 2023   4329 EKG shows sinus rhythm with a rate of 60. PACs present. No concerning ST elevations or depressions. No STEMI. [MM]      ED Course User Index  [MM] Rolando Ellsworth MD     Imaging shows evidence of a right [facility] joint separation with no other abnormalities noted. His labs including cardiac enzymes and electrolytes are reassuring. Pain is well controlled here in the ER. Patient will be following up with his orthopedic surgeon. He is provided with a sling. He was discharged in stable condition.     CONSULTS:  None    PROCEDURES:     Procedures            (Please note that portions of this note were completed with a voice recognition program.  Efforts were made to edit the dictations but occasionally words are mis-transcribed.)    Rolando Ellsworth MD (electronically signed)  Emergency Attending Physician              Rolando Ellsworth MD  07/29/23 6016

## 2023-07-29 NOTE — ED NOTES
Pt reports morphine did not help his pain at all, Dr Suzy Weaver notified.      Delayne Councilman, CHRISTIANO  07/28/23 0292

## 2023-07-29 NOTE — DISCHARGE INSTRUCTIONS
Return to the emergency department for any new or worsening symptoms. In the meantime please follow-up with your orthopedic surgeon regarding your [facility] joint separation. Wear the sling for comfort and I would not be picking up any objects with the right arm.

## 2023-07-30 LAB
EKG ATRIAL RATE: 60 BPM
EKG DIAGNOSIS: NORMAL
EKG P AXIS: -20 DEGREES
EKG P-R INTERVAL: 170 MS
EKG Q-T INTERVAL: 424 MS
EKG QRS DURATION: 90 MS
EKG QTC CALCULATION (BAZETT): 424 MS
EKG R AXIS: -12 DEGREES
EKG T AXIS: 24 DEGREES
EKG VENTRICULAR RATE: 60 BPM

## 2023-12-04 ENCOUNTER — HOSPITAL ENCOUNTER (OUTPATIENT)
Facility: HOSPITAL | Age: 78
Discharge: HOME | End: 2023-12-07
Attending: ORTHOPAEDIC SURGERY
Payer: COMMERCIAL

## 2023-12-04 DIAGNOSIS — S43.101A ACROMIOCLAVICULAR JOINT SEPARATION, TYPE 3, RIGHT, INITIAL ENCOUNTER: ICD-10-CM

## 2023-12-04 DIAGNOSIS — S43.141D: ICD-10-CM

## 2023-12-04 PROCEDURE — 73221 MRI JOINT UPR EXTREM W/O DYE: CPT

## 2024-01-23 ENCOUNTER — TRANSCRIBE ORDERS (OUTPATIENT)
Facility: HOSPITAL | Age: 79
End: 2024-01-23

## 2024-01-23 ENCOUNTER — HOSPITAL ENCOUNTER (OUTPATIENT)
Facility: HOSPITAL | Age: 79
Discharge: HOME | End: 2024-01-26
Payer: COMMERCIAL

## 2024-01-23 DIAGNOSIS — R06.02 SHORTNESS OF BREATH: Primary | ICD-10-CM

## 2024-01-23 DIAGNOSIS — R06.02 SHORTNESS OF BREATH: ICD-10-CM

## 2024-01-23 PROCEDURE — 71046 X-RAY EXAM CHEST 2 VIEWS: CPT

## 2024-01-24 ENCOUNTER — HOSPITAL ENCOUNTER (OUTPATIENT)
Facility: HOSPITAL | Age: 79
Discharge: HOME | End: 2024-01-26
Payer: COMMERCIAL

## 2024-01-24 VITALS
HEIGHT: 68 IN | WEIGHT: 179 LBS | BODY MASS INDEX: 27.13 KG/M2 | SYSTOLIC BLOOD PRESSURE: 142 MMHG | DIASTOLIC BLOOD PRESSURE: 63 MMHG

## 2024-01-24 DIAGNOSIS — R00.1 BRADYCARDIA: ICD-10-CM

## 2024-01-24 DIAGNOSIS — R55 NEAR SYNCOPE: ICD-10-CM

## 2024-01-24 LAB
ECHO AO ARCH DIAM: 2.9 CM
ECHO AO ASC DIAM: 3.8 CM
ECHO AO ASCENDING AORTA INDEX: 1.95 CM/M2
ECHO AR MAX VEL PISA: 2.8 M/S
ECHO AV AREA PEAK VELOCITY: 2.3 CM2
ECHO AV AREA PEAK VELOCITY: 2.3 CM2
ECHO AV AREA VTI: 2.8 CM2
ECHO AV AREA/BSA VTI: 1.4 CM2/M2
ECHO AV MEAN GRADIENT: 4 MMHG
ECHO AV MEAN VELOCITY: 0.9 M/S
ECHO AV PEAK GRADIENT: 7 MMHG
ECHO AV PEAK GRADIENT: 7 MMHG
ECHO AV PEAK VELOCITY: 1.3 M/S
ECHO AV PEAK VELOCITY: 1.4 M/S
ECHO AV REGURGITANT PHT: 910.1 MILLISECOND
ECHO AV VTI: 28.2 CM
ECHO BSA: 1.97 M2
ECHO LA DIAMETER INDEX: 1.74 CM/M2
ECHO LA DIAMETER: 3.4 CM
ECHO LA VOL A-L A2C: 56 ML (ref 18–58)
ECHO LA VOL A-L A4C: 21 ML (ref 18–58)
ECHO LA VOL BP: 37 ML (ref 18–58)
ECHO LA VOL MOD A2C: 54 ML (ref 18–58)
ECHO LA VOL MOD A4C: 20 ML (ref 18–58)
ECHO LA VOL/BSA BIPLANE: 19 ML/M2 (ref 16–34)
ECHO LA VOLUME AREA LENGTH: 40 ML
ECHO LA VOLUME INDEX A-L A2C: 29 ML/M2 (ref 16–34)
ECHO LA VOLUME INDEX A-L A4C: 11 ML/M2 (ref 16–34)
ECHO LA VOLUME INDEX AREA LENGTH: 21 ML/M2 (ref 16–34)
ECHO LA VOLUME INDEX MOD A2C: 28 ML/M2 (ref 16–34)
ECHO LA VOLUME INDEX MOD A4C: 10 ML/M2 (ref 16–34)
ECHO LV E' LATERAL VELOCITY: 10 CM/S
ECHO LV E' SEPTAL VELOCITY: 8 CM/S
ECHO LV EDV A2C: 65 ML
ECHO LV EDV A4C: 104 ML
ECHO LV EDV BP: 82 ML (ref 67–155)
ECHO LV EDV INDEX A4C: 53 ML/M2
ECHO LV EDV INDEX BP: 42 ML/M2
ECHO LV EDV NDEX A2C: 33 ML/M2
ECHO LV EJECTION FRACTION A2C: 62 %
ECHO LV EJECTION FRACTION A4C: 69 %
ECHO LV EJECTION FRACTION BIPLANE: 66 % (ref 55–100)
ECHO LV ESV A2C: 25 ML
ECHO LV ESV A4C: 33 ML
ECHO LV ESV BP: 28 ML (ref 22–58)
ECHO LV ESV INDEX A2C: 13 ML/M2
ECHO LV ESV INDEX A4C: 17 ML/M2
ECHO LV ESV INDEX BP: 14 ML/M2
ECHO LV FRACTIONAL SHORTENING: 42 % (ref 28–44)
ECHO LV INTERNAL DIMENSION DIASTOLE INDEX: 2.56 CM/M2
ECHO LV INTERNAL DIMENSION DIASTOLIC: 5 CM (ref 4.2–5.9)
ECHO LV INTERNAL DIMENSION SYSTOLIC INDEX: 1.49 CM/M2
ECHO LV INTERNAL DIMENSION SYSTOLIC: 2.9 CM
ECHO LV IVSD: 1 CM (ref 0.6–1)
ECHO LV MASS 2D: 182 G (ref 88–224)
ECHO LV MASS INDEX 2D: 93.3 G/M2 (ref 49–115)
ECHO LV POSTERIOR WALL DIASTOLIC: 1 CM (ref 0.6–1)
ECHO LV RELATIVE WALL THICKNESS RATIO: 0.4
ECHO LVOT AREA: 3.8 CM2
ECHO LVOT AV VTI INDEX: 0.72
ECHO LVOT DIAM: 2.2 CM
ECHO LVOT MEAN GRADIENT: 2 MMHG
ECHO LVOT PEAK GRADIENT: 3 MMHG
ECHO LVOT PEAK VELOCITY: 0.8 M/S
ECHO LVOT STROKE VOLUME INDEX: 39.4 ML/M2
ECHO LVOT SV: 76.7 ML
ECHO LVOT VTI: 20.2 CM
ECHO MV A VELOCITY: 0.66 M/S
ECHO MV E DECELERATION TIME (DT): 255.7 MS
ECHO MV E VELOCITY: 0.75 M/S
ECHO MV E/A RATIO: 1.14
ECHO MV E/E' LATERAL: 7.5
ECHO MV E/E' RATIO (AVERAGED): 8.44
ECHO MV REGURGITANT PEAK GRADIENT: 81 MMHG
ECHO MV REGURGITANT PEAK VELOCITY: 4.5 M/S
ECHO PULMONARY ARTERY END DIASTOLIC PRESSURE: 4 MMHG
ECHO PV MAX VELOCITY: 1 M/S
ECHO PV PEAK GRADIENT: 4 MMHG
ECHO PV REGURGITANT MAX VELOCITY: 1 M/S
ECHO RV FREE WALL PEAK S': 11 CM/S
ECHO RV INTERNAL DIMENSION: 3.4 CM
ECHO RV TAPSE: 2.1 CM (ref 1.7–?)
ECHO TV REGURGITANT MAX VELOCITY: 2.39 M/S
ECHO TV REGURGITANT PEAK GRADIENT: 23 MMHG

## 2024-01-24 PROCEDURE — 93306 TTE W/DOPPLER COMPLETE: CPT

## 2024-01-31 ENCOUNTER — TELEPHONE (OUTPATIENT)
Age: 79
End: 2024-01-31

## 2024-01-31 NOTE — TELEPHONE ENCOUNTER
LVM for patient to call and schedule with ANY provider as a new patient. Please confirm location preference..   Hosp f/u from [facility]. Last seen in 2020. Thank you.

## 2024-03-26 NOTE — PROGRESS NOTES
Chief Complaint   Patient presents with    Laceration     pt states he fell down 5 days ago and hurt both knees. thinks his right knee is getting infected. has been applying neosporin and dressing twice a day.  Medication Refill     states he needs his [place] refilled but he knows Dr. Partha Hdz will refill it next week when he sees her. \"REVIEWED RECORD IN PREPARATION FOR VISIT AND HAVE OBTAINED THE NECESSARY DOCUMENTATION\"  1. Have you been to the ER, urgent care clinic since your last visit? Hospitalized since your last visit? No    2. Have you seen or consulted any other health care providers outside of the [address] [place] [address] since your last visit? Include any pap smears or colon screening. No  Patient does not have advanced directives. Clear

## 2025-05-21 ENCOUNTER — APPOINTMENT (OUTPATIENT)
Facility: HOSPITAL | Age: 80
End: 2025-05-21
Payer: MEDICARE

## 2025-05-21 ENCOUNTER — HOSPITAL ENCOUNTER (EMERGENCY)
Facility: HOSPITAL | Age: 80
Discharge: LEFT BEFORE DISCHARGE | End: 2025-05-21
Attending: EMERGENCY MEDICINE
Payer: MEDICARE

## 2025-05-21 VITALS
RESPIRATION RATE: 18 BRPM | HEART RATE: 103 BPM | TEMPERATURE: 97.8 F | OXYGEN SATURATION: 90 % | DIASTOLIC BLOOD PRESSURE: 154 MMHG | SYSTOLIC BLOOD PRESSURE: 171 MMHG

## 2025-05-21 DIAGNOSIS — F80.81 STUTTERING: ICD-10-CM

## 2025-05-21 DIAGNOSIS — R47.01 APHASIA: ICD-10-CM

## 2025-05-21 DIAGNOSIS — R29.90 STROKE-LIKE SYMPTOMS: Primary | ICD-10-CM

## 2025-05-21 LAB
ALBUMIN SERPL-MCNC: 3.4 G/DL (ref 3.5–5)
ALBUMIN/GLOB SERPL: 1.1 (ref 1.1–2.2)
ALP SERPL-CCNC: 111 U/L (ref 45–117)
ALT SERPL-CCNC: 48 U/L (ref 12–78)
ANION GAP SERPL CALC-SCNC: 9 MMOL/L (ref 2–12)
AST SERPL-CCNC: 42 U/L (ref 15–37)
BASOPHILS # BLD: 0.03 K/UL (ref 0–0.1)
BASOPHILS NFR BLD: 0.4 % (ref 0–1)
BILIRUB SERPL-MCNC: 0.4 MG/DL (ref 0.2–1)
BUN SERPL-MCNC: 11 MG/DL (ref 6–20)
BUN/CREAT SERPL: 10 (ref 12–20)
CALCIUM SERPL-MCNC: 8.1 MG/DL (ref 8.5–10.1)
CHLORIDE SERPL-SCNC: 103 MMOL/L (ref 97–108)
CO2 SERPL-SCNC: 25 MMOL/L (ref 21–32)
CREAT SERPL-MCNC: 1.12 MG/DL (ref 0.7–1.3)
DIFFERENTIAL METHOD BLD: ABNORMAL
EOSINOPHIL # BLD: 0.1 K/UL (ref 0–0.4)
EOSINOPHIL NFR BLD: 1.4 % (ref 0–7)
ERYTHROCYTE [DISTWIDTH] IN BLOOD BY AUTOMATED COUNT: 19.9 % (ref 11.5–14.5)
FLUAV RNA SPEC QL NAA+PROBE: NOT DETECTED
FLUBV RNA SPEC QL NAA+PROBE: NOT DETECTED
GLOBULIN SER CALC-MCNC: 3 G/DL (ref 2–4)
GLUCOSE BLD STRIP.AUTO-MCNC: 130 MG/DL (ref 65–117)
GLUCOSE SERPL-MCNC: 120 MG/DL (ref 65–100)
HCT VFR BLD AUTO: 41.6 % (ref 36.6–50.3)
HGB BLD-MCNC: 13 G/DL (ref 12.1–17)
IMM GRANULOCYTES # BLD AUTO: 0.04 K/UL (ref 0–0.04)
IMM GRANULOCYTES NFR BLD AUTO: 0.6 % (ref 0–0.5)
INR PPP: 1.2 (ref 0.9–1.1)
LYMPHOCYTES # BLD: 1.39 K/UL (ref 0.8–3.5)
LYMPHOCYTES NFR BLD: 20 % (ref 12–49)
MCH RBC QN AUTO: 22.5 PG (ref 26–34)
MCHC RBC AUTO-ENTMCNC: 31.3 G/DL (ref 30–36.5)
MCV RBC AUTO: 72 FL (ref 80–99)
MONOCYTES # BLD: 1.1 K/UL (ref 0–1)
MONOCYTES NFR BLD: 15.8 % (ref 5–13)
NEUTS SEG # BLD: 4.3 K/UL (ref 1.8–8)
NEUTS SEG NFR BLD: 61.8 % (ref 32–75)
NRBC # BLD: 0 K/UL (ref 0–0.01)
NRBC BLD-RTO: 0 PER 100 WBC
PLATELET # BLD AUTO: 129 K/UL (ref 150–400)
POTASSIUM SERPL-SCNC: 4 MMOL/L (ref 3.5–5.1)
PROT SERPL-MCNC: 6.4 G/DL (ref 6.4–8.2)
PROTHROMBIN TIME: 12.2 SEC (ref 9.2–11.2)
RBC # BLD AUTO: 5.78 M/UL (ref 4.1–5.7)
SARS-COV-2 RNA RESP QL NAA+PROBE: DETECTED
SERVICE CMNT-IMP: ABNORMAL
SODIUM SERPL-SCNC: 137 MMOL/L (ref 136–145)
SOURCE: ABNORMAL
TROPONIN I SERPL HS-MCNC: 12 NG/L (ref 0–76)
WBC # BLD AUTO: 7 K/UL (ref 4.1–11.1)

## 2025-05-21 PROCEDURE — 93005 ELECTROCARDIOGRAM TRACING: CPT | Performed by: EMERGENCY MEDICINE

## 2025-05-21 PROCEDURE — 99285 EMERGENCY DEPT VISIT HI MDM: CPT

## 2025-05-21 PROCEDURE — 85610 PROTHROMBIN TIME: CPT

## 2025-05-21 PROCEDURE — 84484 ASSAY OF TROPONIN QUANT: CPT

## 2025-05-21 PROCEDURE — 82962 GLUCOSE BLOOD TEST: CPT

## 2025-05-21 PROCEDURE — 70498 CT ANGIOGRAPHY NECK: CPT

## 2025-05-21 PROCEDURE — 85025 COMPLETE CBC W/AUTO DIFF WBC: CPT

## 2025-05-21 PROCEDURE — 0042T CT BRAIN PERFUSION: CPT

## 2025-05-21 PROCEDURE — 71045 X-RAY EXAM CHEST 1 VIEW: CPT

## 2025-05-21 PROCEDURE — 6360000004 HC RX CONTRAST MEDICATION: Performed by: EMERGENCY MEDICINE

## 2025-05-21 PROCEDURE — 70450 CT HEAD/BRAIN W/O DYE: CPT

## 2025-05-21 PROCEDURE — 6370000000 HC RX 637 (ALT 250 FOR IP): Performed by: EMERGENCY MEDICINE

## 2025-05-21 PROCEDURE — 80053 COMPREHEN METABOLIC PANEL: CPT

## 2025-05-21 PROCEDURE — 87636 SARSCOV2 & INF A&B AMP PRB: CPT

## 2025-05-21 PROCEDURE — 36415 COLL VENOUS BLD VENIPUNCTURE: CPT

## 2025-05-21 RX ORDER — IOPAMIDOL 755 MG/ML
120 INJECTION, SOLUTION INTRAVASCULAR
Status: COMPLETED | OUTPATIENT
Start: 2025-05-21 | End: 2025-05-21

## 2025-05-21 RX ORDER — ACETAMINOPHEN 500 MG
1000 TABLET ORAL
Status: COMPLETED | OUTPATIENT
Start: 2025-05-21 | End: 2025-05-21

## 2025-05-21 RX ADMIN — IOPAMIDOL 120 ML: 755 INJECTION, SOLUTION INTRAVENOUS at 14:12

## 2025-05-21 RX ADMIN — ACETAMINOPHEN 1000 MG: 500 TABLET ORAL at 15:17

## 2025-05-21 ASSESSMENT — PAIN SCALES - GENERAL: PAINLEVEL_OUTOF10: 5

## 2025-05-21 NOTE — ED NOTES
Code Stroke Level: 1  Signs and symptoms: aphasia   Code Stroke activation time: 1331  Provider at bedside time:  1331  VAN score: unknown, unable to determine at this time  Last Known Well (Time): 1245  Blood Glucose Result/Time: 136   Blood Pressure: 201/84  Anticoagulants (List medications): yes Eliquis

## 2025-05-21 NOTE — ED NOTES
Unable to perform accurate neuro assessment at pt time of arrival to due pt's AMS and aphasia. Pt protecting airway, not following commands, responsive to painful stimuli. No facial droop noted.     1330- pt to CT for CT head.     Upon arrival back to ED, pt remains aphasic and altered when beginning assessment. During assessment, pt opened eyes and states \"what is going on?\" Pt appeared intermittently lethargic but would respond to painful stimuli and answers questions appropriately, no slurred speech noted. Pt able to lift bilateral upper extremities and attempts to move bilateral lower extremities but states he is \"too weak.\" Full sensation noted.     1400- pt to CT scan for CTA.     Tele-neuro at bedside upon arrival back to room. Assessment completed. Pt A&OX4, following commands, speaking in full sentences without slurred speech or facial droop. Pt can lift bilateral upper extremities and lower with some weakness noted.     Lab work obtained, pt provided with urinal.

## 2025-05-21 NOTE — ED PROVIDER NOTES
[place] EMERGENCY DEPARTMENT  EMERGENCY DEPARTMENT ENCOUNTER      Pt Name: Karthik Hsu  MRN: [id number]  Birthdate 1945  Date of evaluation: 5/21/2025  Provider: Alejo Kellogg DO    CHIEF COMPLAINT       Chief Complaint   Patient presents with    Aphasia         HISTORY OF PRESENT ILLNESS   (Location/Symptom, Timing/Onset, Context/Setting, Quality, Duration, Modifying Factors, Severity)  Note limiting factors.   79-year-old male comes in with family.  He was in the car going to the doctors for possible pneumonia when he had acute onset change in mental status.  Patient brought into the room by family with stuttering speech inability to follow commands and.  Glucose was okay, hyper tensive here.  Concern for stroke based on arrival presentation.  Made level 1 stroke alert.            Review of External Medical Records:     Nursing Notes were reviewed.    REVIEW OF SYSTEMS    (2-9 systems for level 4, 10 or more for level 5)     Review of Systems    Except as noted above the remainder of the review of systems was reviewed and negative.       PAST MEDICAL HISTORY     Past Medical History:   Diagnosis Date    Acid reflux     Cardiac dysrhythmia, unspecified 12/21/2011    Diabetes (HCC)     GERD (gastroesophageal reflux disease)     Headache(784.0) 12/21/2011    Hypercholesterolemia     Hypothyroid     Ill-defined condition     COVID 2/21    Obesity     PUD (peptic ulcer disease)     TIA (transient ischemic attack)     2014         SURGICAL HISTORY       Past Surgical History:   Procedure Laterality Date    COLONOSCOPY  03/23/2018    GASTRECTOMY  1975    PUD    HEENT      left retroorbital tumor resection    MOHS SURGERY Right     right shoulder    SHOULDER ARTHROPLASTY Left     TUMOR REMOVAL  2015    Left Eye         CURRENT MEDICATIONS       Previous Medications    ACETAMINOPHEN (TYLENOL) 325 MG TABLET    Take 650 mg by mouth every 6 hours as needed    APIXABAN (ELIQUIS) 5 MG TABS TABLET     usage.    Total critical care time (not including time spent performing separately reportable procedures): 40 minutes      Addendum: Patient states he does not want to stay in the hospital anymore.  Discussed risk and benefits of going home but staying in the hospital.  This is witnessed between me and his wife.  At this time patient is being signed out AGAINST MEDICAL ADVICE      Amount and/or Complexity of Data Reviewed  Labs: ordered. Decision-making details documented in ED Course.  Radiology: ordered. Decision-making details documented in ED Course.  ECG/medicine tests: ordered and independent interpretation performed. Decision-making details documented in ED Course.    Risk  OTC drugs.  Prescription drug management.  Decision regarding hospitalization.            REASSESSMENT     ED Course as of 05/21/25 1527   Wed May 21, 2025   1458 CT BRAIN PERFUSION [GG]   1506 EKG 1500  Independent evaluation shows A-fib at 92 bpm.  Normal axis.  Otherwise normal intervals.  No STEMI [GG]      ED Course User Index  [GG] Alejo Kellogg DO           CONSULTS:  IP CONSULT TO TELE-NEUROLOGY    PROCEDURES:  Unless otherwise noted below, none     Procedures      FINAL IMPRESSION      1. Stroke-like symptoms    2. Aphasia    3. Stuttering          DISPOSITION/PLAN   DISPOSITION [place] 05/21/2025 03:26:13 PM          (Please note that portions of this note were completed with a voice recognition program.  Efforts were made to edit the dictations but occasionally words are mis-transcribed.)    Alejo Kellogg DO (electronically signed)  Emergency Attending Physician / Physician Assistant / Nurse Practitioner      [facility] [employer] Consult for Admission  3:27 PM    ED Room Number: WT13/TR13  Patient Name and age:  Karthik Hsu 79 y.o.  male  Working Diagnosis:   1. Stroke-like symptoms    2. Aphasia    3. Stuttering        COVID-19 Suspicion: No  Sepsis present:  No  Reassessment needed: No  Code Status:  Full

## 2025-05-21 NOTE — ED TRIAGE NOTES
Signs and symptoms: slurred speech, decreased LOC   Code Stroke activation time: 1328   Provider at bedside time:  1325  VAN score:   Last Known Well (Time): 1255  Blood Glucose Result/Time: 130   Blood Pressure: 201/84  Anticoagulants (List medications): apixaban

## 2025-05-22 LAB
EKG DIAGNOSIS: NORMAL
EKG Q-T INTERVAL: 326 MS
EKG QRS DURATION: 82 MS
EKG QTC CALCULATION (BAZETT): 403 MS
EKG R AXIS: -22 DEGREES
EKG T AXIS: 74 DEGREES
EKG VENTRICULAR RATE: 92 BPM

## 2025-05-22 PROCEDURE — 93010 ELECTROCARDIOGRAM REPORT: CPT | Performed by: INTERNAL MEDICINE

## 2025-07-11 ENCOUNTER — TELEPHONE (OUTPATIENT)
Age: 80
End: 2025-07-11

## 2025-07-11 NOTE — TELEPHONE ENCOUNTER
Please check to see if you can locate a referral for memory concerns.  If so please get him scheduled.    Thanks!